# Patient Record
Sex: MALE | Race: WHITE | Employment: UNEMPLOYED | URBAN - METROPOLITAN AREA
[De-identification: names, ages, dates, MRNs, and addresses within clinical notes are randomized per-mention and may not be internally consistent; named-entity substitution may affect disease eponyms.]

---

## 2016-10-14 LAB
CREATININE, EXTERNAL: 1.04
HBA1C MFR BLD HPLC: 7.5 %
LDL-C, EXTERNAL: 80

## 2017-01-09 ENCOUNTER — TELEPHONE (OUTPATIENT)
Dept: CARDIOLOGY CLINIC | Age: 52
End: 2017-01-09

## 2017-01-09 NOTE — TELEPHONE ENCOUNTER
Patient identified times 2. Spoke with patient and relayed abnormal testing and the need for follow up to discuss. He has been scheduled to see Dr. Marissa Cody on 1/12/17. He does not wish to start Imdur until researching medication and coverage. He will discuss with Dr. Marissa Cody during follow up. Understanding verbalized.

## 2017-01-12 ENCOUNTER — OFFICE VISIT (OUTPATIENT)
Dept: CARDIOLOGY CLINIC | Age: 52
End: 2017-01-12

## 2017-01-12 VITALS
DIASTOLIC BLOOD PRESSURE: 80 MMHG | SYSTOLIC BLOOD PRESSURE: 130 MMHG | WEIGHT: 238 LBS | OXYGEN SATURATION: 98 % | RESPIRATION RATE: 16 BRPM | BODY MASS INDEX: 35.25 KG/M2 | HEIGHT: 69 IN | HEART RATE: 76 BPM

## 2017-01-12 DIAGNOSIS — I25.10 CAD IN NATIVE ARTERY: Primary | Chronic | ICD-10-CM

## 2017-01-12 DIAGNOSIS — E11.59 DM TYPE 2 CAUSING VASCULAR DISEASE (HCC): ICD-10-CM

## 2017-01-12 DIAGNOSIS — I10 ESSENTIAL HYPERTENSION, BENIGN: ICD-10-CM

## 2017-01-12 DIAGNOSIS — I63.89 CEREBROVASCULAR ACCIDENT (CVA) DUE TO OTHER MECHANISM (HCC): ICD-10-CM

## 2017-01-12 DIAGNOSIS — E78.5 DYSLIPIDEMIA, GOAL LDL BELOW 70: ICD-10-CM

## 2017-01-12 RX ORDER — CHOLECALCIFEROL TAB 125 MCG (5000 UNIT) 125 MCG
5000 TAB ORAL DAILY
COMMUNITY
End: 2018-02-01

## 2017-01-12 RX ORDER — SILDENAFIL 50 MG/1
50 TABLET, FILM COATED ORAL AS NEEDED
Qty: 20 TAB | Refills: 1 | Status: SHIPPED | OUTPATIENT
Start: 2017-01-12 | End: 2018-02-01

## 2017-01-12 NOTE — MR AVS SNAPSHOT
Visit Information Date & Time Provider Department Dept. Phone Encounter #  
 1/12/2017  1:40 PM Liyah Rodríguez MD [facility] [phone] [id number] Your Appointments 6/22/2017 10:40 AM  
ESTABLISHED PATIENT with Liyah Rodríguez MD  
[facility] (LUIS SCHEDULING) Appt Note: 6 mnth f/u  
 [address] [place] [address]  
[address] [address] Upcoming Health Maintenance Date Due Hepatitis C Screening 1965 FOOT EXAM Q1 4/24/1973 MICROALBUMIN Q1 4/24/1973 EYE EXAM RETINAL OR DILATED Q1 4/24/1973 Pneumococcal 19-64 Highest Risk (1 of 3 - PCV13) 4/24/1982 FOBT Q 1 YEAR AGE 50-75 4/24/2013 HEMOGLOBIN A1C Q6M 7/26/2016 INFLUENZA AGE 9 TO ADULT 8/1/2016 LIPID PANEL Q1 10/14/2017 DTaP/Tdap/Td series (2 - Td) 1/25/2026 Allergies as of 1/12/2017  Review Complete On: 1/12/2017 By: Devaughn Sánchez Severity Noted Reaction Type Reactions Iodinated Contrast Media - Oral And Iv Dye High 09/01/2013   Intolerance Anaphylaxis Current Immunizations  Reviewed on 5/5/2016 Name Date Influenza Vaccine 10/1/2012 Pneumococcal Polysaccharide (PPSV-23)  Deferred (Patient Refused) Tdap 1/25/2016  6:09 PM  
  
 Not reviewed this visit Vitals BP Pulse Resp Height(growth percentile) Weight(growth percentile) SpO2  
 130/80 (BP 1 Location: Left arm, BP Patient Position: Sitting) 76 16 5' 9\" (1.753 m) 238 lb (108 kg) 98% BMI Smoking Status 35.15 kg/m2 Never Smoker BMI and BSA Data Body Mass Index Body Surface Area  
 35.15 kg/m 2 2.29 m 2 Preferred Pharmacy [facility]acy [facility] Phone [facility] [address] [phone] Your Updated Medication List  
  
   
This list is accurate as of: 1/12/17  2:00 PM.  Always use your most recent med list.  
  
  
  
  
 acetaminophen 500 mg tablet Commonly known as:  TYLENOL Take 1,000 mg by mouth daily as needed for Pain. amLODIPine 10 mg tablet Commonly known as:  Iva Colon Take 1 Tab by mouth Daily (before dinner). atorvastatin 40 mg tablet Commonly known as:  LIPITOR Take 2 Tabs by mouth nightly. cholecalciferol (VITAMIN D3) 5,000 unit Tab tablet Commonly known as:  VITAMIN D3 Take 5,000 Units by mouth daily. clopidogrel 75 mg Tab Commonly known as:  PLAVIX Take 1 Tab by mouth daily. coenzyme q10-vitamin e 100-100 mg-unit Cap Take 1 Cap by mouth daily. cyclobenzaprine 10 mg tablet Commonly known as:  FLEXERIL Take 10 mg by mouth as needed. FLUoxetine 20 mg capsule Commonly known as:  PROzac Take 20 mg by mouth daily. insulin lispro protamine/insulin lispro 100 unit/mL (75-25) injection Commonly known as:  HUMALOG MIX 75/25  
40 Units by SubCUTAneous route two (2) times a day. Prior to breakfast in the morning and at bedtime  
  
 losartan 100 mg tablet Commonly known as:  COZAAR Take 1 Tab by mouth daily. LYRICA 50 mg capsule Generic drug:  pregabalin Take 50 mg by mouth three (3) times daily. metFORMIN 500 mg tablet Commonly known as:  GLUCOPHAGE Take 1,500 mg by mouth daily. metoprolol succinate 100 mg tablet Commonly known as:  TOPROL-XL Take 1 Tab by mouth daily. multivitamin tablet Commonly known as:  ONE A DAY Take 1 Tab by mouth daily. MYOFLEX 10 % topical cream  
Generic drug:  trolamine salicylate Apply  to affected area as needed. pantoprazole 40 mg tablet Commonly known as:  PROTONIX Take 40 mg by mouth daily. sildenafil citrate 50 mg tablet Commonly known as:  VIAGRA Take 1 Tab by mouth as needed. tamsulosin 0.4 mg capsule Commonly known as:  FLOMAX Take 0.4 mg by mouth every evening. traZODone 50 mg tablet Commonly known as:  [place] Take 50 mg by mouth nightly. Prescriptions Sent to Pharmacy Refills  
 sildenafil citrate (VIAGRA) 50 mg tablet 1 Sig: Take 1 Tab by mouth as needed. Class: Normal  
 [facility]acy: [facility], [address] [place] [address] #: [phone] Route: Oral  
  
Patient Instructions Take Viagra 50 mg as needed Follow up with Dr. Lizet May in 6 months Introducing [facility] SERVICES! Dear Shahram Navarrete: 
Thank you for requesting a [employer] account. Our records indicate that you already have an active [employer] account. You can access your account anytime at [email or web address] Did you know that you can access your hospital and ER discharge instructions at any time in [employer]? You can also review all of your test results from your hospital stay or ER visit. Additional Information If you have questions, please visit the Frequently Asked Questions section of the [employer] website at [email or web address] Remember, [employer] is NOT to be used for urgent needs. For medical emergencies, dial 911. Now available from your iPhone and Android! Please provide this summary of care documentation to your next provider. Your primary care clinician is listed as Liz Kiran. If you have any questions after today's visit, please call [phone].

## 2017-01-12 NOTE — PROGRESS NOTES
José Manuel Pruett     1965       Kenroy Armstrong MD, [facility]  Date of Visit-1/12/2017   PCP is Greg Epley, MD   [facility]  [facility]  HPI:  José Manuel Pruett is a 46 y.o. male     Extensive anterior MI in 2013 and stroke in 2016. He saw us last month with some chest pain intermittently. A lexiscan nuclear showed EF of 50% with partial reversibility in the anterior apical region. There was a fixed defect in the inferior wall. Patient is back to discuss symptoms and stress test. He states his chest discomfort is left sided, mild and lasting a couple of minutes and worsens with taking a deep breath. He states current pain is not similar to that occurring in 2013 during MI. He also reports persistent ED for which he desires medication. Denies syncope or shortness of breath at rest, has no tachycardia, palpitations or sense of arrhythmia. wants disability paperwork, most of limitation relates to stroke  Sees Endocrine Dr Agueda Duffy OT   Weight high at 238#  Assessment/Plan:       CAD  -prior STEMI with drug eluding stent to right coronary   -most of what I see on the inferior wall defect is fixed defect from that STEMI  -he did have LAD lesion and we see a little anterior apical ischemia but it is a small area and meeting with him today his pain does not sound typical for angina and it certainly has stable pattern   Severe LAD residual unable to cross so fits with dz likey not amenable to PCI   MI Trop was 46  -I think the current medical therapy is appropriate, no cath unless worse symptoms    Erectile dysfuncion  -would need to avoid nitrates but I think he is okay to use ED drugs and will give him Viagra 5 mg to be used as needed     HTN -CCB,ARB, BB  DM2- insulin/Metformin with creatinine fu with -Endocrine  Pain stroke (right basal ganglia by MRI), Lyria  Lipids - high potency statin  Follow up in 6 months      Impression:   1.  CAD in native artery    2. Essential hypertension, benign    3. Dyslipidemia, goal LDL below 70    4. Cerebrovascular accident (CVA) due to other mechanism ([address]    5. DM type 2 causing vascular disease ([address]       Cardiac History:   Cath 9/1/13 Pressures: normal  LVG: mild inferoapical hypokinesis, EF about 60%, no MR  RCA: occluded distally with faint filling of DV by LCA collaterals-- easily reopened, clean moderate size DV, stented with 3 x 22mm ALLAN -->0%. LCA: Small LCX with one ALOM, mild irreg. Ramus proximal 70%, LAD proximal 50% then occluded mid-portion with long area of occlusion and with reconstitution mid-distal, appears to fill by transseptal collaterals. IMP:   Although the LAD is not the acute culprit vessel, I was concerned that there was potential for severe ischemia, I thought there may be a very faint antegrade channel, and this might be the best chance to recanalize and stent, vs waiting and doing so later. I attempted to cross the occlusion, multiple guidewires tries, unable to proceed beyond the site of occlusion. I suspect this is chronic, and apparently had good enough collateral flow to not cause patient exertional angina. Stable anatomy. 9/2/13 Lipid panel   HDL 25   TTE 9/2/13 LVEF 55-60%, mild LVH, mild LAE     ROS-except as noted above. . A complete cardiac and respiratory are reviewed and negative except as above ; Resp-denies wheezing  or productive cough,.  Const- No unusual weight loss or fever; Neuro-no recent seizure or CVA ; GI- No BRBPR, abdom pain, bloating ; - no  hematuria   see supplement sheet, initialed and to be scanned by staff  Past Medical History   Diagnosis Date    BPH (benign prostatic hyperplasia)     CAD (coronary artery disease)      multivessel    CKD (chronic kidney disease), stage III     DM type 2 causing neurological disease ([address]     DM type 2 causing renal disease ([address]     DM type 2 causing vascular disease ([address]     Dyslipidemia, goal LDL below 70     Essential hypertension, benign     Lupus ([facility]     Neuropathy     Thoracic outlet syndrome       Social Hx= reports that he has never smoked. He does not have any smokeless tobacco history on file. He reports that he drinks alcohol. He reports that he does not use illicit drugs. Exam and Labs:    Visit Vitals    /80 (BP 1 Location: Left arm, BP Patient Position: Sitting)    Pulse 76    Resp 16    Ht 5' 9\" (1.753 m)    Wt 238 lb (108 kg)    SpO2 98%    BMI 35.15 kg/m2      Constitutional:  NAD, comfortable  Head: NC,AT. Eyes: No scleral icterus. Neck:  Neck supple. No JVD present. Throat: moist mucous membranes. Chest: Effort normal & normal respiratory excursion . Neurological: alert, conversant and oriented . Skin: Skin is not cold. No obvious systemic rash noted. Not diaphoretic. No erythema. Psychiatric:  Grossly normal mood and affect. Behavior appears normal. Extremities:  no clubbing or cyanosis. Abdomen: non distended    Lungs:breath sounds normal. No stridor. distress, wheezes or  Rales. Heart:    normal rate, regular rhythm, normal S1, S2, no murmurs, rubs, clicks or gallops , PMI non displaced. Edema: Edema is none. Lab Results   Component Value Date/Time    Cholesterol, total 142 05/06/2016 04:50 AM    HDL Cholesterol 27 05/06/2016 04:50 AM    LDL, calculated 79.6 05/06/2016 04:50 AM    Triglyceride 177 05/06/2016 04:50 AM    CHOL/HDL Ratio 5.3 05/06/2016 04:50 AM     No results found for this or any previous visit.    Lab Results   Component Value Date/Time    Sodium 141 06/08/2016 03:38 AM    Potassium 3.6 06/08/2016 03:38 AM    Chloride 105 06/08/2016 03:38 AM    CO2 31 06/08/2016 03:38 AM    Anion gap 5 06/08/2016 03:38 AM    Glucose 80 06/08/2016 03:38 AM    BUN 17 06/08/2016 03:38 AM    Creatinine 0.99 06/08/2016 03:38 AM    BUN/Creatinine ratio 17 06/08/2016 03:38 AM    GFR est AA >60 06/08/2016 03:38 AM    GFR est non-AA >60 06/08/2016 03:38 AM Calcium 8.9 06/08/2016 03:38 AM      Wt Readings from Last 3 Encounters:   01/12/17 238 lb (108 kg)   12/20/16 237 lb (107.5 kg)   05/29/16 234 lb 4.8 oz (106.3 kg)      BP Readings from Last 3 Encounters:   01/12/17 130/80   12/20/16 (!) 160/100   06/08/16 131/89        Current Outpatient Prescriptions   Medication Sig    cholecalciferol, VITAMIN D3, (VITAMIN D3) 5,000 unit tab tablet Take 5,000 Units by mouth daily.  sildenafil citrate (VIAGRA) 50 mg tablet Take 1 Tab by mouth as needed.  metFORMIN (GLUCOPHAGE) 500 mg tablet Take 1,500 mg by mouth daily.  LYRICA 50 mg capsule Take 50 mg by mouth three (3) times daily.  FLUoxetine (PROZAC) 20 mg capsule Take 20 mg by mouth daily.  pantoprazole (PROTONIX) 40 mg tablet Take 40 mg by mouth daily.  cyclobenzaprine (FLEXERIL) 10 mg tablet Take 10 mg by mouth as needed.  trolamine salicylate (MYOFLEX) 10 % topical cream Apply  to affected area as needed.  insulin lispro protamine/insulin lispro (HUMALOG MIX 75/25) 100 unit/mL (75-25) injection 40 Units by SubCUTAneous route two (2) times a day. Prior to breakfast in the morning and at bedtime    amLODIPine (NORVASC) 10 mg tablet Take 1 Tab by mouth Daily (before dinner).  atorvastatin (LIPITOR) 40 mg tablet Take 2 Tabs by mouth nightly.  clopidogrel (PLAVIX) 75 mg tablet Take 1 Tab by mouth daily.  coenzyme q10-vitamin e 100-100 mg-unit cap Take 1 Cap by mouth daily.  acetaminophen (TYLENOL) 500 mg tablet Take 1,000 mg by mouth daily as needed for Pain.  multivitamin (ONE A DAY) tablet Take 1 Tab by mouth daily.  losartan (COZAAR) 100 mg tablet Take 1 Tab by mouth daily.  metoprolol-XL (TOPROL-XL) 100 mg XL tablet Take 1 Tab by mouth daily.  tamsulosin (FLOMAX) 0.4 mg capsule Take 0.4 mg by mouth every evening.  traZODone (DESYREL) 50 mg tablet Take 50 mg by mouth nightly. No current facility-administered medications for this visit.        Impression see above.    Written by Shanel Stevens, as dictated by Amisha Burnett MD.

## 2017-05-30 ENCOUNTER — OFFICE VISIT (OUTPATIENT)
Dept: CARDIOLOGY CLINIC | Age: 52
End: 2017-05-30

## 2017-05-30 DIAGNOSIS — N18.30 CKD (CHRONIC KIDNEY DISEASE), STAGE III (HCC): ICD-10-CM

## 2017-05-30 DIAGNOSIS — I25.10 CAD IN NATIVE ARTERY: Primary | Chronic | ICD-10-CM

## 2017-05-30 DIAGNOSIS — I10 ESSENTIAL HYPERTENSION, BENIGN: ICD-10-CM

## 2017-05-30 DIAGNOSIS — E78.00 HYPERCHOLESTEREMIA: ICD-10-CM

## 2017-05-30 DIAGNOSIS — I25.2 HISTORY OF ACUTE INFERIOR WALL MI: ICD-10-CM

## 2017-05-30 DIAGNOSIS — I67.9 CEREBRAL VASCULAR DISEASE: ICD-10-CM

## 2017-05-30 RX ORDER — GABAPENTIN 600 MG/1
600 TABLET ORAL 2 TIMES DAILY
COMMUNITY
End: 2018-02-01

## 2017-05-30 NOTE — MR AVS SNAPSHOT
Visit Information Date & Time Provider Department Dept. Phone Encounter #  
 5/30/2017  9:40 AM Luly Burkett MD [facility] [phone] [id number] Upcoming Health Maintenance Date Due Hepatitis C Screening 1965 FOOT EXAM Q1 4/24/1973 MICROALBUMIN Q1 4/24/1973 EYE EXAM RETINAL OR DILATED Q1 4/24/1973 Pneumococcal 19-64 Medium Risk (1 of 1 - PPSV23) 4/24/1982 FOBT Q 1 YEAR AGE 50-75 4/24/2013 HEMOGLOBIN A1C Q6M 4/14/2017 INFLUENZA AGE 9 TO ADULT 8/1/2017 LIPID PANEL Q1 10/14/2017 DTaP/Tdap/Td series (2 - Td) 1/25/2026 Allergies as of 5/30/2017  Review Complete On: 5/30/2017 By: Luly Burkett MD  
  
 Severity Noted Reaction Type Reactions Iodinated Contrast Media - Oral And Iv Dye High 09/01/2013   Intolerance Anaphylaxis Current Immunizations  Reviewed on 5/5/2016 Name Date Influenza Vaccine 10/1/2012 Pneumococcal Polysaccharide (PPSV-23)  Deferred (Patient Refused) Tdap 1/25/2016  6:09 PM  
  
 Not reviewed this visit You Were Diagnosed With   
  
 Codes Comments CAD in native artery    -  Primary ICD-10-CM: I25.10 ICD-9-CM: 414.01 Essential hypertension, benign     ICD-10-CM: I10 
ICD-9-CM: 401.1 Hypercholesteremia     ICD-10-CM: E78.00 ICD-9-CM: 272.0 CKD (chronic kidney disease), stage III     ICD-10-CM: N18.3 ICD-9-CM: 106. 3 Cerebral vascular disease     ICD-10-CM: I67.9 ICD-9-CM: 437.9 Vitals Smoking Status Never Smoker Preferred Pharmacy [facility] [address] [phone] Your Updated Medication List  
  
   
This list is accurate as of: 5/30/17 10:55 AM.  Always use your most recent med list.  
  
  
  
  
 acetaminophen 500 mg tablet Commonly known as:  TYLENOL Take 1,000 mg by mouth daily as needed for Pain. amLODIPine 10 mg tablet Commonly known as:  Sandra Dennis Take 1 Tab by mouth Daily (before dinner). atorvastatin 40 mg tablet Commonly known as:  LIPITOR Take 2 Tabs by mouth nightly. cholecalciferol (VITAMIN D3) 5,000 unit Tab tablet Commonly known as:  VITAMIN D3 Take 5,000 Units by mouth daily. clopidogrel 75 mg Tab Commonly known as:  PLAVIX Take 1 Tab by mouth daily. coenzyme q10-vitamin e 100-100 mg-unit Cap Take 1 Cap by mouth daily. cyclobenzaprine 10 mg tablet Commonly known as:  FLEXERIL Take 10 mg by mouth as needed. FLUoxetine 20 mg capsule Commonly known as:  PROzac Take 20 mg by mouth daily. gabapentin 600 mg tablet Commonly known as:  NEURONTIN Take 600 mg by mouth two (2) times a day. insulin lispro protamine/insulin lispro 100 unit/mL (75-25) injection Commonly known as:  HUMALOG MIX 75/25  
40 Units by SubCUTAneous route two (2) times a day. Prior to breakfast in the morning and at bedtime  
  
 losartan 100 mg tablet Commonly known as:  COZAAR Take 1 Tab by mouth daily. metFORMIN 500 mg tablet Commonly known as:  GLUCOPHAGE Take 1,500 mg by mouth daily. metoprolol succinate 100 mg tablet Commonly known as:  TOPROL-XL Take 1 Tab by mouth daily. multivitamin tablet Commonly known as:  ONE A DAY Take 1 Tab by mouth daily. MYOFLEX 10 % topical cream  
Generic drug:  trolamine salicylate Apply  to affected area as needed. pantoprazole 40 mg tablet Commonly known as:  PROTONIX Take 40 mg by mouth daily. sildenafil citrate 50 mg tablet Commonly known as:  VIAGRA Take 1 Tab by mouth as needed. tamsulosin 0.4 mg capsule Commonly known as:  FLOMAX Take 0.4 mg by mouth every evening. traZODone 50 mg tablet Commonly known as:  Adiel Risen Take 50 mg by mouth nightly. We Performed the Following AMB POC EKG ROUTINE W/ 12 LEADS, INTER & REP [81926 CPT(R)] Patient Instructions You will need to follow up in clinic with Dr. Thais Caro in 6 months. Introducing [facility] SERVICES! Dear Ashwini Briceud: 
Thank you for requesting a [employer] account. Our records indicate that you already have an active [employer] account. You can access your account anytime at [email or web address] Did you know that you can access your hospital and ER discharge instructions at any time in [employer]? You can also review all of your test results from your hospital stay or ER visit. Additional Information If you have questions, please visit the Frequently Asked Questions section of the [employer] website at [email or web address] Remember, [employer] is NOT to be used for urgent needs. For medical emergencies, dial 911. Now available from your iPhone and Android! Please provide this summary of care documentation to your next provider. Your primary care clinician is listed as Demian Sauer. If you have any questions after today's visit, please call [phone].

## 2017-06-23 ENCOUNTER — APPOINTMENT (OUTPATIENT)
Dept: CT IMAGING | Age: 52
End: 2017-06-23
Attending: EMERGENCY MEDICINE
Payer: COMMERCIAL

## 2017-06-23 ENCOUNTER — HOSPITAL ENCOUNTER (EMERGENCY)
Age: 52
Discharge: HOME | End: 2017-06-23
Attending: EMERGENCY MEDICINE
Payer: COMMERCIAL

## 2017-06-23 VITALS
WEIGHT: 235.89 LBS | TEMPERATURE: 98 F | HEIGHT: 72 IN | BODY MASS INDEX: 31.95 KG/M2 | RESPIRATION RATE: 17 BRPM | DIASTOLIC BLOOD PRESSURE: 105 MMHG | HEART RATE: 115 BPM | OXYGEN SATURATION: 92 % | SYSTOLIC BLOOD PRESSURE: 175 MMHG

## 2017-06-23 DIAGNOSIS — I10 UNCONTROLLED HYPERTENSION: ICD-10-CM

## 2017-06-23 DIAGNOSIS — R07.9 ACUTE CHEST PAIN: Primary | ICD-10-CM

## 2017-06-23 LAB
ANION GAP BLD CALC-SCNC: 9 MMOL/L (ref 5–15)
ATRIAL RATE: 105 BPM
BASOPHILS # BLD AUTO: 0.1 K/UL (ref 0–0.1)
BASOPHILS # BLD: 1 % (ref 0–1)
BUN SERPL-MCNC: 16 MG/DL (ref 6–20)
BUN/CREAT SERPL: 15 (ref 12–20)
CALCIUM SERPL-MCNC: 9.2 MG/DL (ref 8.5–10.1)
CALCULATED P AXIS, ECG09: 36 DEGREES
CALCULATED R AXIS, ECG10: -149 DEGREES
CALCULATED T AXIS, ECG11: 17 DEGREES
CHLORIDE SERPL-SCNC: 100 MMOL/L (ref 97–108)
CO2 SERPL-SCNC: 26 MMOL/L (ref 21–32)
CREAT SERPL-MCNC: 1.09 MG/DL (ref 0.7–1.3)
DIAGNOSIS, 93000: NORMAL
EOSINOPHIL # BLD: 0.2 K/UL (ref 0–0.4)
EOSINOPHIL NFR BLD: 2 % (ref 0–7)
ERYTHROCYTE [DISTWIDTH] IN BLOOD BY AUTOMATED COUNT: 13.7 % (ref 11.5–14.5)
GLUCOSE SERPL-MCNC: 308 MG/DL (ref 65–100)
HCT VFR BLD AUTO: 43.5 % (ref 36.6–50.3)
HGB BLD-MCNC: 15.1 G/DL (ref 12.1–17)
LYMPHOCYTES # BLD AUTO: 19 % (ref 12–49)
LYMPHOCYTES # BLD: 1.3 K/UL (ref 0.8–3.5)
MCH RBC QN AUTO: 28.7 PG (ref 26–34)
MCHC RBC AUTO-ENTMCNC: 34.7 G/DL (ref 30–36.5)
MCV RBC AUTO: 82.7 FL (ref 80–99)
MONOCYTES # BLD: 0.6 K/UL (ref 0–1)
MONOCYTES NFR BLD AUTO: 9 % (ref 5–13)
NEUTS SEG # BLD: 4.8 K/UL (ref 1.8–8)
NEUTS SEG NFR BLD AUTO: 69 % (ref 32–75)
P-R INTERVAL, ECG05: 146 MS
PLATELET # BLD AUTO: 155 K/UL (ref 150–400)
POTASSIUM SERPL-SCNC: 3.8 MMOL/L (ref 3.5–5.1)
Q-T INTERVAL, ECG07: 390 MS
QRS DURATION, ECG06: 142 MS
QTC CALCULATION (BEZET), ECG08: 515 MS
RBC # BLD AUTO: 5.26 M/UL (ref 4.1–5.7)
SODIUM SERPL-SCNC: 135 MMOL/L (ref 136–145)
TROPONIN I BLD-MCNC: <0.04 NG/ML (ref 0–0.08)
TROPONIN I SERPL-MCNC: <0.04 NG/ML
VENTRICULAR RATE, ECG03: 105 BPM
WBC # BLD AUTO: 6.9 K/UL (ref 4.1–11.1)

## 2017-06-23 PROCEDURE — 80048 BASIC METABOLIC PNL TOTAL CA: CPT | Performed by: EMERGENCY MEDICINE

## 2017-06-23 PROCEDURE — 99285 EMERGENCY DEPT VISIT HI MDM: CPT

## 2017-06-23 PROCEDURE — 84484 ASSAY OF TROPONIN QUANT: CPT | Performed by: EMERGENCY MEDICINE

## 2017-06-23 PROCEDURE — 85025 COMPLETE CBC W/AUTO DIFF WBC: CPT | Performed by: EMERGENCY MEDICINE

## 2017-06-23 PROCEDURE — 36415 COLL VENOUS BLD VENIPUNCTURE: CPT | Performed by: EMERGENCY MEDICINE

## 2017-06-23 PROCEDURE — 70450 CT HEAD/BRAIN W/O DYE: CPT

## 2017-06-23 PROCEDURE — 93005 ELECTROCARDIOGRAM TRACING: CPT

## 2017-06-23 NOTE — ED NOTES
Patient arrived through triage c/o high blood pressure and headache. Patient states he was sent over by doctor during an us for arteries and they found his bp was high. Patient is resting in bed, bed in low position, call bell in reach, monitor x3.

## 2017-06-23 NOTE — DISCHARGE INSTRUCTIONS
We hope that we have addressed all of your medical concerns. The examination and treatment you received in the Emergency Department were for an emergent problem and were not intended as complete care. It is important that you follow up with your healthcare provider(s) for ongoing care. If your symptoms worsen or do not improve as expected, and you are unable to reach your usual health care provider(s), you should return to the Emergency Department. Today's healthcare is undergoing tremendous change, and patient satisfaction surveys are one of the many tools to assess the quality of medical care. You may receive a survey from the [employer] regarding your experience in the Emergency Department. I hope that your experience has been completely positive, particularly the medical care that I provided. As such, please participate in the survey; anything less than excellent does not meet my expectations or intentions. Formerly [facility] and [facility] participate in nationally recognized quality of care measures. If your blood pressure is greater than 120/80, as reported below, we urge that you seek medical care to address the potential of high blood pressure, commonly known as hypertension. Hypertension can be hereditary or can be caused by certain medical conditions, pain, stress, or \"white coat syndrome. \"       Please make an appointment with your health care provider(s) for follow up of your Emergency Department visit. VITALS:   Patient Vitals for the past 8 hrs:   Temp Pulse Resp BP SpO2   06/23/17 1400 - - - (!) 175/105 -   06/23/17 1359 - - - - 92 %   06/23/17 1305 - - - (!) 176/115 95 %   06/23/17 1235 - - - (!) 189/112 91 %   06/23/17 1206 - - - (!) 185/134 96 %   06/23/17 1121 98 °F (36.7 °C) (!) 115 17 (!) 175/116 96 %          Thank you for allowing us to provide you with medical care today.   We realize that you have many choices for your emergency care needs. Please choose us in the future for any continued health care needs. Regards,           Manoj David DO    [facility], [facility].   Office: [phone]            Recent Results (from the past 24 hour(s))   EKG, 12 LEAD, INITIAL    Collection Time: 06/23/17 11:31 AM   Result Value Ref Range    Ventricular Rate 105 BPM    Atrial Rate 105 BPM    P-R Interval 146 ms    QRS Duration 142 ms    Q-T Interval 390 ms    QTC Calculation (Bezet) 515 ms    Calculated P Axis 36 degrees    Calculated R Axis -149 degrees    Calculated T Axis 17 degrees    Diagnosis       Sinus tachycardia  Right bundle branch block  Inferior infarct (cited on or before 01-SEP-2013)  Abnormal ECG  When compared with ECG of 06-MAY-2016 04:54,  QRS axis shifted left  Questionable change in initial forces of Inferior leads     CBC WITH AUTOMATED DIFF    Collection Time: 06/23/17 11:47 AM   Result Value Ref Range    WBC 6.9 4.1 - 11.1 K/uL    RBC 5.26 4.10 - 5.70 M/uL    HGB 15.1 12.1 - 17.0 g/dL    HCT 43.5 36.6 - 50.3 %    MCV 82.7 80.0 - 99.0 FL    MCH 28.7 26.0 - 34.0 PG    MCHC 34.7 30.0 - 36.5 g/dL    RDW 13.7 11.5 - 14.5 %    PLATELET 619 225 - 558 K/uL    NEUTROPHILS 69 32 - 75 %    LYMPHOCYTES 19 12 - 49 %    MONOCYTES 9 5 - 13 %    EOSINOPHILS 2 0 - 7 %    BASOPHILS 1 0 - 1 %    ABS. NEUTROPHILS 4.8 1.8 - 8.0 K/UL    ABS. LYMPHOCYTES 1.3 0.8 - 3.5 K/UL    ABS. MONOCYTES 0.6 0.0 - 1.0 K/UL    ABS. EOSINOPHILS 0.2 0.0 - 0.4 K/UL    ABS.  BASOPHILS 0.1 0.0 - 0.1 K/UL   METABOLIC PANEL, BASIC    Collection Time: 06/23/17 11:47 AM   Result Value Ref Range    Sodium 135 (L) 136 - 145 mmol/L    Potassium 3.8 3.5 - 5.1 mmol/L    Chloride 100 97 - 108 mmol/L    CO2 26 21 - 32 mmol/L    Anion gap 9 5 - 15 mmol/L    Glucose 308 (H) 65 - 100 mg/dL    BUN 16 6 - 20 MG/DL    Creatinine 1.09 0.70 - 1.30 MG/DL    BUN/Creatinine ratio 15 12 - 20      GFR est AA >60 >60 ml/min/1.73m2    GFR est non-AA >60 >60 ml/min/1.73m2    Calcium 9.2 8.5 - 10.1 MG/DL   TROPONIN I    Collection Time: 06/23/17 11:47 AM   Result Value Ref Range    Troponin-I, Qt. <0.04 <0.05 ng/mL   POC TROPONIN-I    Collection Time: 06/23/17  1:39 PM   Result Value Ref Range    Troponin-I (POC) <0.04 0.00 - 0.08 ng/mL       Ct Head Wo Cont    Result Date: 6/23/2017  EXAM:  CT HEAD WO CONT Clinical history: Headache on blood pressure INDICATION:   headache, elevated blood pressure. hx of stroke. COMPARISON: 5/5/2016. TECHNIQUE: Unenhanced CT of the head was performed using 5 mm images. Brain and bone windows were generated. CT dose reduction was achieved through use of a standardized protocol tailored for this examination and automatic exposure control for dose modulation. FINDINGS: Remote infarction in the right basal ganglia. No acute infarct is identified. . There is no significant white matter disease. There is no intracranial hemorrhage, extra-axial collection, mass, mass effect or midline shift. The basilar cisterns are open. No acute infarct is identified. The bone windows demonstrate no abnormalities. The visualized portions of the paranasal sinuses and mastoid air cells are clear. IMPRESSION: No acute intracranial process. Chest Pain: Care Instructions  Your Care Instructions  There are many things that can cause chest pain. Some are not serious and will get better on their own in a few days. But some kinds of chest pain need more testing and treatment. Your doctor may have recommended a follow-up visit in the next 8 to 12 hours. If you are not getting better, you may need more tests or treatment. Even though your doctor has released you, you still need to watch for any problems. The doctor carefully checked you, but sometimes problems can develop later. If you have new symptoms or if your symptoms do not get better, get medical care right away.   If you have worse or different chest pain or pressure that lasts more than 5 minutes or you passed out (lost consciousness), call 911 or seek other emergency help right away. A medical visit is only one step in your treatment. Even if you feel better, you still need to do what your doctor recommends, such as going to all suggested follow-up appointments and taking medicines exactly as directed. This will help you recover and help prevent future problems. How can you care for yourself at home? · Rest until you feel better. · Take your medicine exactly as prescribed. Call your doctor if you think you are having a problem with your medicine. · Do not drive after taking a prescription pain medicine. When should you call for help? Call 911 if:  · You passed out (lost consciousness). · You have severe difficulty breathing. · You have symptoms of a heart attack. These may include:  ¨ Chest pain or pressure, or a strange feeling in your chest.  ¨ Sweating. ¨ Shortness of breath. ¨ Nausea or vomiting. ¨ Pain, pressure, or a strange feeling in your back, neck, jaw, or upper belly or in one or both shoulders or arms. ¨ Lightheadedness or sudden weakness. ¨ A fast or irregular heartbeat. After you call 911, the  may tell you to chew 1 adult-strength or 2 to 4 low-dose aspirin. Wait for an ambulance. Do not try to drive yourself. Call your doctor today if:  · You have any trouble breathing. · Your chest pain gets worse. · You are dizzy or lightheaded, or you feel like you may faint. · You are not getting better as expected. · You are having new or different chest pain. Where can you learn more? Go to http://julián-lyle.info/. Enter A120 in the search box to learn more about \"Chest Pain: Care Instructions. \"  Current as of: March 20, 2017  Content Version: 11.3  © 9638-7279 [employer]. Care instructions adapted under license by [employer] (which disclaims liability or warranty for this information).  If you have questions about a medical condition or this instruction, always ask your healthcare professional. [address] any warranty or liability for your use of this information. High Blood Pressure: Care Instructions  Your Care Instructions  If your blood pressure is usually above 140/90, you have high blood pressure, or hypertension. That means the top number is 140 or higher or the bottom number is 90 or higher, or both. Despite what a lot of people think, high blood pressure usually doesn't cause headaches or make you feel dizzy or lightheaded. It usually has no symptoms. But it does increase your risk for heart attack, stroke, and kidney or eye damage. The higher your blood pressure, the more your risk increases. Your doctor will give you a goal for your blood pressure. Your goal will be based on your health and your age. An example of a goal is to keep your blood pressure below 140/90. Lifestyle changes, such as eating healthy and being active, are always important to help lower blood pressure. You might also take medicine to reach your blood pressure goal.  Follow-up care is a key part of your treatment and safety. Be sure to make and go to all appointments, and call your doctor if you are having problems. It's also a good idea to know your test results and keep a list of the medicines you take. How can you care for yourself at home? Medical treatment  · If you stop taking your medicine, your blood pressure will go back up. You may take one or more types of medicine to lower your blood pressure. Be safe with medicines. Take your medicine exactly as prescribed. Call your doctor if you think you are having a problem with your medicine. · Talk to your doctor before you start taking aspirin every day. Aspirin can help certain people lower their risk of a heart attack or stroke. But taking aspirin isn't right for everyone, because it can cause serious bleeding. · See your doctor regularly.  You may need to see the doctor more often at first or until your blood pressure comes down. · If you are taking blood pressure medicine, talk to your doctor before you take decongestants or anti-inflammatory medicine, such as ibuprofen. Some of these medicines can raise blood pressure. · Learn how to check your blood pressure at home. Lifestyle changes  · Stay at a healthy weight. This is especially important if you put on weight around the waist. Losing even 10 pounds can help you lower your blood pressure. · If your doctor recommends it, get more exercise. Walking is a good choice. Bit by bit, increase the amount you walk every day. Try for at least 30 minutes on most days of the week. You also may want to swim, bike, or do other activities. · Avoid or limit alcohol. Talk to your doctor about whether you can drink any alcohol. · Try to limit how much sodium you eat to less than 2,300 milligrams (mg) a day. Your doctor may ask you to try to eat less than 1,500 mg a day. · Eat plenty of fruits (such as bananas and oranges), vegetables, legumes, whole grains, and low-fat dairy products. · Lower the amount of saturated fat in your diet. Saturated fat is found in animal products such as milk, cheese, and meat. Limiting these foods may help you lose weight and also lower your risk for heart disease. · Do not smoke. Smoking increases your risk for heart attack and stroke. If you need help quitting, talk to your doctor about stop-smoking programs and medicines. These can increase your chances of quitting for good. When should you call for help? Call 911 anytime you think you may need emergency care. This may mean having symptoms that suggest that your blood pressure is causing a serious heart or blood vessel problem. Your blood pressure may be over 180/110. For example, call 911 if:  · You have symptoms of a heart attack.  These may include:  ¨ Chest pain or pressure, or a strange feeling in the chest.  ¨ Sweating. ¨ Shortness of breath. ¨ Nausea or vomiting. ¨ Pain, pressure, or a strange feeling in the back, neck, jaw, or upper belly or in one or both shoulders or arms. ¨ Lightheadedness or sudden weakness. ¨ A fast or irregular heartbeat. · You have symptoms of a stroke. These may include:  ¨ Sudden numbness, tingling, weakness, or loss of movement in your face, arm, or leg, especially on only one side of your body. ¨ Sudden vision changes. ¨ Sudden trouble speaking. ¨ Sudden confusion or trouble understanding simple statements. ¨ Sudden problems with walking or balance. ¨ A sudden, severe headache that is different from past headaches. · You have severe back or belly pain. Do not wait until your blood pressure comes down on its own. Get help right away. Call your doctor now or seek immediate care if:  · Your blood pressure is much higher than normal (such as 180/110 or higher), but you don't have symptoms. · You think high blood pressure is causing symptoms, such as:  ¨ Severe headache. ¨ Blurry vision. Watch closely for changes in your health, and be sure to contact your doctor if:  · Your blood pressure measures 140/90 or higher at least 2 times. That means the top number is 140 or higher or the bottom number is 90 or higher, or both. · You think you may be having side effects from your blood pressure medicine. · Your blood pressure is usually normal, but it goes above normal at least 2 times. Where can you learn more? Go to http://julián-lyle.info/. Enter R479 in the search box to learn more about \"High Blood Pressure: Care Instructions. \"  Current as of: August 8, 2016  Content Version: 11.3  © 8800-0192 [employer]. Care instructions adapted under license by [employer] (which disclaims liability or warranty for this information).  If you have questions about a medical condition or this instruction, always ask your healthcare professional. [address] any warranty or liability for your use of this information.

## 2017-06-23 NOTE — ED PROVIDER NOTES
HPI Comments: 45 y.o. male with extensive past medical history, please see list, significant for HTN, stroke CAD, CKD stage III, BPH, type II DM, MI and neuropathy who presents ambulatory from Dr. Fay Becker office with chief complaint of elevated blood pressure. Pt was at a routine f/u visit today with vascular surgery for a carotid artery ultrasound when his blood pressure was noted to be elevated. Pt's initial reading was 200/100, second reading was 212/133 and final reading at 1045 was 199/128, so he was referred to the ED. Pt reports associated headache and lower back pain. Pt describes headache as \"fullness\" and \"throbbing\" primarily over the back of his head with a severity of 2/10. Pt denies treatment, stating \"nothing works for the headaches\". Of note, pt has frequent headaches that last for weeks at a time. Pt's current headache has been ongoing for 2 weeks. Pt's blood pressure is currently treated with losartan and amlodipine. When his pressures are controlled appropriately, his baseline blood pressure is 110/78. Pt denies missing any doses of his medications. Pt also notes blood pressure is effected by eating certain foods. He had salted boiled peanuts last night, which he suspects contributed to his elevated blood pressure. Pt states \"throbbing\" lower back pain is usually associated with elevated blood pressure, although he notes chronic back pain secondary to DDD. Pt describes ongoing, intermittent episodes of right-sided chest discomfort that he attributes to angina. Pt states episodes last between seconds and minutes at at time. Pt denies any pattern to the pain. Of note, pt is diaphoretic to which pt responded: \"I get hot easily\". Pt endorses a history of stroke requiring extended hospitalization on 5/5/16. Pt describes residual left-sided deficits and intermittent slurred speech. Pt states he only slept for 3 hours last night.  Pt describes specifically describes unemployment and finances as stressors in his life. Pt denies a history of brain hemorrhage. Pt is followed by several specialists, including vascular, endocrinologist, cardiologist, psychiatrist, and nephrologist. Pt specifically denies abd pain, nausea, SOB and visual changes. There are no other acute medical concerns at this time. Old Chart Review:  Pt evaluated by cardiology out-patient on 5/30/17. Blood pressure reading was 180/110. He was told to begin keeping a blood pressure diary and restarted losartan. Pt has a history of cath in September, 2013 showed occluded RCA filling by collaterals with also occlusion in the LAD. Last nuclear stress test showed fixed inferior wall defect with partial reversibility in the anterior apical wall. Pt has a history of CKD stage III. Social hx: denies tobacco use; uses EtOH; denies illicit drug use; former paramedic;   PCP: Vickki Galeazzi, MD  Cardiology: Sandrita Schmidt MD  Vascular Surgery: Xi Martino MD    Note written by Emily Villeda, as dictated by Manoj David, DO 12:00 PM        The history is provided by the patient.         Past Medical History:   Diagnosis Date    BPH (benign prostatic hyperplasia)     CAD (coronary artery disease)     multivessel    CKD (chronic kidney disease), stage III     DM type 2 causing neurological disease ([address]     DM type 2 causing renal disease ([address]     DM type 2 causing vascular disease (HCC)     Dyslipidemia, goal LDL below 79     Essential hypertension, benign     History of acute inferior wall MI 5/30/2017    Lupus ([address]     Neuropathy     Thoracic outlet syndrome        Past Surgical History:   Procedure Laterality Date    HX ORTHOPAEDIC  1996    L. knee arthroscopy         Family History:   Problem Relation Age of Onset    Heart Disease Mother     Stroke Mother     Diabetes Father     Stroke Father     Heart Disease Father        Social History     Social History    Marital status:      Spouse name: N/A   Nicole Paul Number of children: N/A    Years of education: N/A     Occupational History    Not on file. Social History Main Topics    Smoking status: Never Smoker    Smokeless tobacco: Not on file    Alcohol use Yes    Drug use: No    Sexual activity: Not on file     Other Topics Concern    Not on file     Social History Narrative         ALLERGIES: Iodinated contrast- oral and iv dye    Review of Systems   Constitutional: Positive for diaphoresis. Eyes: Negative for visual disturbance. Respiratory: Negative for shortness of breath. Gastrointestinal: Negative for abdominal pain and nausea. Musculoskeletal: Positive for back pain (lower). Neurological: Positive for headaches. All other systems reviewed and are negative. Vitals:    06/23/17 1121   BP: (!) 175/116   Pulse: (!) 115   Resp: 17   Temp: 98 °F (36.7 °C)   SpO2: 96%   Weight: 107 kg (235 lb 14.3 oz)   Height: 6' (1.829 m)            Physical Exam   Nursing note and vitals reviewed. Constitutional: Pt is awake and alert. Pt appears well-developed and well-nourished. NAD. HENT:   Head: Normocephalic and atraumatic. Nose: Nose normal.   Mouth/Throat: Oropharynx is clear and moist. No oropharyngeal exudate. Eyes: Conjunctivae and extraocular motions are normal. Pupils are equal, round, and reactive to light. Right eye exhibits no discharge. Left eye exhibits no discharge. No scleral icterus. Neck: No tracheal deviation present. Supple neck. Cardiovascular: Normal rate, regular rhythm, normal heart sounds and intact distal pulses. Exam reveals no gallop and no friction rub. No murmur heard. Pulmonary/Chest: Effort normal and breath sounds normal.  Pt  has no wheezes. Pt  has no rales. Abdominal: Soft. Pt  exhibits no distension and no mass. No tenderness. Pt  has no rebound and no guarding. Musculoskeletal:  Pt  exhibits no edema and no tenderness.    Ext: Normal ROM in all four extremities; not tender to palpation; distal pulses are normal, no edema. Neurological:  Pt is alert. nonfocal neuro exam. CN 2-12 intact. Normal coordination. Normal gait. Normal sensation. Normal strength. Skin: Skin is warm and dry. Slightly diaphoretic. Psychiatric:  Pt  has a normal mood and affect. Behavior is normal.   Note written by Emily Almanza, as dictated by Yuly Looney DO 12:00 PM       [facility]  ED Course       Procedures    ED EKG interpretation:  Rhythm: sinus tachycardia and RBBB; and regular . Rate (approx.): 106; Axis: normal; P wave: normal; QRS interval: normal ; ST/T wave: normal; This EKG was interpreted by Yuly Looney DO,ED Provider.          Has asymptomatic elevated BP    Refer back to PCP  Doubt cva    CT noted    Labs Reviewed   METABOLIC PANEL, BASIC - Abnormal; Notable for the following:        Result Value    Sodium 135 (*)     Glucose 308 (*)     All other components within normal limits   SAMPLES BEING HELD   CBC WITH AUTOMATED DIFF   TROPONIN I   POC TROPONIN-I

## 2017-06-23 NOTE — ED NOTES
Dr. Axel Wilkins reviewed discharge instructions with the patient. The patient verbalized understanding. All questions and concerns were addressed. The patient declined a wheelchair and is discharged ambulatory in the care of family members with instructions and prescriptions in hand. Pt is alert and oriented x 4. Respirations are clear and unlabored.

## 2018-02-01 ENCOUNTER — OFFICE VISIT (OUTPATIENT)
Dept: CARDIOLOGY CLINIC | Age: 53
End: 2018-02-01

## 2018-02-01 VITALS
HEART RATE: 96 BPM | WEIGHT: 223.8 LBS | OXYGEN SATURATION: 96 % | HEIGHT: 72 IN | RESPIRATION RATE: 18 BRPM | BODY MASS INDEX: 30.31 KG/M2 | SYSTOLIC BLOOD PRESSURE: 124 MMHG | DIASTOLIC BLOOD PRESSURE: 72 MMHG

## 2018-02-01 DIAGNOSIS — I10 ESSENTIAL HYPERTENSION: ICD-10-CM

## 2018-02-01 DIAGNOSIS — N18.30 CKD (CHRONIC KIDNEY DISEASE), STAGE III (HCC): ICD-10-CM

## 2018-02-01 DIAGNOSIS — E78.5 DYSLIPIDEMIA: ICD-10-CM

## 2018-02-01 DIAGNOSIS — I25.10 CAD IN NATIVE ARTERY: Primary | Chronic | ICD-10-CM

## 2018-02-01 DIAGNOSIS — I67.9 CEREBRAL VASCULAR DISEASE: ICD-10-CM

## 2018-02-01 DIAGNOSIS — I25.2 HISTORY OF ACUTE INFERIOR WALL MI: ICD-10-CM

## 2018-02-01 RX ORDER — VALSARTAN 160 MG/1
320 TABLET ORAL DAILY
COMMUNITY
End: 2018-10-16

## 2018-02-01 RX ORDER — OMEPRAZOLE 40 MG/1
40 CAPSULE, DELAYED RELEASE ORAL DAILY
COMMUNITY
End: 2018-06-02

## 2018-02-01 RX ORDER — QUETIAPINE FUMARATE 25 MG/1
50 TABLET, FILM COATED ORAL
COMMUNITY
End: 2018-10-16

## 2018-02-01 RX ORDER — CARBAMAZEPINE 200 MG/1
200 TABLET ORAL 2 TIMES DAILY
COMMUNITY
End: 2018-10-16

## 2018-02-01 RX ORDER — CHLORTHALIDONE 25 MG/1
TABLET ORAL DAILY
COMMUNITY
End: 2020-11-19

## 2018-02-01 RX ORDER — CLONIDINE HYDROCHLORIDE 0.1 MG/1
0.4 TABLET ORAL 2 TIMES DAILY
COMMUNITY
End: 2018-10-16

## 2018-02-01 NOTE — MR AVS SNAPSHOT
[address] [facility] [address] [facility] 57 
[phone] Patient: Sandy Cruz MRN: [id number] :1965 Visit Information Date & Time Provider Department Dept. Phone Encounter #  
 2018  3:00 PM Juliano Lee MD [facility] [phone] [id number] Upcoming Health Maintenance Date Due Hepatitis C Screening 1965 FOOT EXAM Q1 1973 MICROALBUMIN Q1 1973 EYE EXAM RETINAL OR DILATED Q1 1973 Pneumococcal 19-64 Medium Risk (1 of 1 - PPSV23) 1982 FOBT Q 1 YEAR AGE 50-75 2013 HEMOGLOBIN A1C Q6M 2017 Influenza Age 5 to Adult 2017 LIPID PANEL Q1 10/14/2017 DTaP/Tdap/Td series (2 - Td) 2026 Allergies as of 2018  Review Complete On: 2018 By: Chaim Rivera Severity Noted Reaction Type Reactions Iodinated Contrast- Oral And Iv Dye High 2013   Intolerance Anaphylaxis Current Immunizations  Reviewed on 2016 Name Date Influenza Vaccine 10/1/2012 Pneumococcal Polysaccharide (PPSV-23)  Deferred (Patient Refused) Tdap 2016  6:09 PM  
  
 Not reviewed this visit You Were Diagnosed With   
  
 Codes Comments CAD in native artery    -  Primary ICD-10-CM: I25.10 ICD-9-CM: 414.01 Vitals BP Pulse Resp Height(growth percentile) Weight(growth percentile) SpO2  
 124/72 (BP 1 Location: Left arm) 96 18 6' (1.829 m) 223 lb 12.8 oz (101.5 kg) 96% BMI Smoking Status 30.35 kg/m2 Never Smoker Vitals History BMI and BSA Data Body Mass Index Body Surface Area  
 30.35 kg/m 2 2.27 m 2 Preferred Pharmacy Pharmacy Name Phone [facility] [address], [address] [place] [address] [phone] Your Updated Medication List  
  
   
This list is accurate as of: 18  3:41 PM.  Always use your most recent med list.  
  
  
  
  
 amLODIPine 10 mg tablet Commonly known as:  Rulon Alyssa Take 1 Tab by mouth Daily (before dinner). atorvastatin 40 mg tablet Commonly known as:  LIPITOR Take 2 Tabs by mouth nightly. chlorthalidone 25 mg tablet Commonly known as:  Arvind Gun Take  by mouth daily. cloNIDine HCl 0.1 mg tablet Commonly known as:  CATAPRES Take 0.2 mg by mouth two (2) times a day. clopidogrel 75 mg Tab Commonly known as:  PLAVIX Take 1 Tab by mouth daily. cyclobenzaprine 10 mg tablet Commonly known as:  FLEXERIL Take 10 mg by mouth as needed. DIOVAN 160 mg tablet Generic drug:  valsartan Take  by mouth daily. * HumaLOG Mix 75-25 100 unit/mL (75-25) injection Generic drug:  insulin lispro protamine/insulin lispro 35 Units by SubCUTAneous route two (2) times a day. * insulin lispro protamine/insulin lispro 100 unit/mL (75-25) injection Commonly known as:  HUMALOG MIX 75/25  
40 Units by SubCUTAneous route two (2) times a day. Prior to breakfast in the morning and at bedtime MYOFLEX 10 % topical cream  
Generic drug:  trolamine salicylate Apply  to affected area as needed. PriLOSEC 40 mg capsule Generic drug:  omeprazole Take 40 mg by mouth daily. SEROquel 25 mg tablet Generic drug:  QUEtiapine Take  by mouth nightly. tamsulosin 0.4 mg capsule Commonly known as:  FLOMAX Take 0.4 mg by mouth every evening. TEGretol 200 mg tablet Generic drug:  carBAMazepine Take 200 mg by mouth three (3) times daily. traZODone 50 mg tablet Commonly known as:  Rodriges Rinne Take 50 mg by mouth as needed. VYVANSE 20 mg capsule Generic drug:  lisdexamfetamine Take by mouth daily. * Notice: This list has 2 medication(s) that are the same as other medications prescribed for you. Read the directions carefully, and ask your doctor or other care provider to review them with you. To-Do List   
 07/01/2018 ECHO:  ECHO TTE STRESS EXRCSE COMP W OR WO CONTR Patient Instructions You will need to follow up in clinic with Dr. Naomy Thacker in 6 months. Please schedule a Stress Echo prior to office visit. Same day is okay. Introducing \[facility] [place] [facility]\"" & [facility] SERVICES! Dear Kimber Aguilar: 
Thank you for requesting a "[employer] account. Our records indicate that you already have an active "[employer] account. You can access your account anytime at [email or web address] Did you know that you can access your hospital and ER discharge instructions at any time in "[employer]? You can also review all of your test results from your hospital stay or ER visit. Additional Information If you have questions, please visit the Frequently Asked Questions section of the "[employer] website at [email or web address] Remember, "[employer] is NOT to be used for urgent needs. For medical emergencies, dial 911. Now available from your iPhone and Android! Please provide this summary of care documentation to your next provider. Your primary care clinician is listed as Trevor Pabloch. If you have any questions after today's visit, please call [phone].

## 2018-02-01 NOTE — PROGRESS NOTES
Stress Echo ordered     Verbal order per Dr. Ching Munguia    Per  VO to discontinue all medication not taken.

## 2018-02-01 NOTE — PATIENT INSTRUCTIONS
You will need to follow up in clinic with Dr. Kai Harrison in 6 months. Please schedule a Stress Echo prior to office visit. Same day is okay.

## 2018-02-01 NOTE — PROGRESS NOTES
Michael Birmingham     1965       Kenroy Luciano MD, [facility]  Date of Visit-2/1/2018   PCP is Desi Long MD   [facility] [place]  [facility]  HPI:  Michael Birmingham is a 45 y.o. male   Previous hx of anterior MI in 2013 stroke in 2016.   --Melda Randalier nuclear showed EF of 50% with partial reversibility in the anterior apical region. There was a fixed defect in the inferior wall    Overall the pt states he is doing well. The pt notes that his medications have been moving around quite a bit but he is keeping track of all this. His HBG A1C was 7.8%. Denies chest pain, edema, syncope or shortness of breath at rest, has no tachycardia, palpitations or sense of arrhythmia. EKG:     Assessment/Plan:     1. CAD   ---MI with troponin of 51 ;cath September 2013, occluded RCA filling by collaterals and managed medically with partial occlusion of the LAD has done well with medical therapy   --defect with partial reversibility in the anterior apical wall. We discussed results with him, felt that was consistent with known coronary artery disease anatomy from the cath and felt he could be managed medically. 2. Hypertension at goal   Home bp diary-keep  Key CAD CHF Meds             chlorthalidone (HYGROTEN) 25 mg tablet  (Taking) Take  by mouth daily. cloNIDine HCl (CATAPRES) 0.1 mg tablet  (Taking) Take 0.2 mg by mouth two (2) times a day. valsartan (DIOVAN) 160 mg tablet  (Taking) Take  by mouth daily. amLODIPine (NORVASC) 10 mg tablet  (Taking) Take 1 Tab by mouth Daily (before dinner). clopidogrel (PLAVIX) 75 mg tablet  (Taking) Take 1 Tab by mouth daily. BP Readings from Last 3 Encounters:   02/01/18 124/72   06/23/17 (!) 175/105   01/12/17 130/80     3. Dyslipidemia on Lipitor followed by Dr. Stiven Bhat will obtain lab results    4. CKD III -stable on ARB    5.  Cerebrovascular disease, medical therapy with prior right basilar ganglia stroke  DM2= main cardiovascular disease risk factors     6. Follow up in 6 months with stress echo, and get lab results. Future Appointments  Date Time Provider Keiko Haque   8/16/2018 1:00 PM ECHO, [address]   8/16/2018 1:00 PM STRESSECHO, [address]   8/16/2018 2:00 PM MD JESSICA Brown LUIS SCHED              Impression:   1. CAD in native artery    2. Essential hypertension    3. Dyslipidemia    4. Cerebral vascular disease    5. History of acute inferior wall MI    6. CKD (chronic kidney disease), stage III       Cardiac History:   MI Troponin 51   Cath 9/1/13 LVG: mild inferoapical hypokinesis, EF about 60%  RCA: occluded distally with faint filling of DV by LCA collaterals-- easily reopened, clean moderate size DV, stented with 3 x 22mm ALLAN -->0%. LCA: Small LCX with one ALOM, mild irreg. Ramus proximal 70%  LAD proximal 50% then occluded mid-portion with long area of occlusion and with reconstitution mid-distal, appears to fill by transseptal collaterals. =attempted to cross the occlusion, multiple guidewires tries, unable to proceed beyond the site of occlusion. I suspect this is chronic, and apparently had good enough collateral flow to not cause patient exertional angina. Nuke 12-29-16= Lexiscan fixed mid-basl inferior wall & apical defect with partial reversibility at anteroapical wall, EF 50%  CVA 5/2016 right basal ganglia by MRI     ROS-except as noted above. . A complete cardiac and respiratory are reviewed and negative except as above ; Resp-denies wheezing  or productive cough,.  Const- No unusual weight loss or fever; Neuro-no recent seizure or CVA ; GI- No BRBPR, abdom pain, bloating ; - no  hematuria   see supplement sheet, initialed and to be scanned by staff  Past Medical History:   Diagnosis Date    BPH (benign prostatic hyperplasia)     CAD (coronary artery disease)     multivessel    CKD (chronic kidney disease), stage III     DM type 2 [facility] disease [facility]     DM type 2 causing renal disease [facility]     [facility]     Dyslipidemia, goal LDL below 70     Essential hypertension, benign     History of acute inferior wall MI 5/30/2017    Lupus     Neuropathy     Thoracic outlet syndrome       Social Hx= reports that he has never smoked. He has never used smokeless tobacco. He reports that he drinks alcohol. He reports that he does not use illicit drugs. Exam and Labs:  /72 (BP 1 Location: Left arm)  Pulse 96  Resp 18  Ht 6' (1.829 m)  Wt 223 lb 12.8 oz (101.5 kg)  SpO2 96%  BMI 30.35 kg/n9Bcpvuclazbattw:  NAD, comfortable  Head: NC,AT. Eyes: No scleral icterus. Neck:  Neck supple. No JVD present. Throat: moist mucous membranes. Chest: Effort normal & normal respiratory excursion . Neurological: alert, conversant and oriented . Skin: Skin is not cold. No obvious systemic rash noted. Not diaphoretic. No erythema. Psychiatric:  Grossly normal mood and affect. Behavior appears normal. Extremities:  no clubbing or cyanosis. Abdomen: non distended    Lungs:breath sounds normal. No stridor. distress, wheezes or  Rales. HYVXE:1/9 systolic murmur at the RUSB without radiation normal rate, regular rhythm, normal S1, S2, no rubs, clicks or gallops , PMI non displaced. Edema: Edema is none.   Lab Results   Component Value Date/Time    Cholesterol, total 142 05/06/2016 04:50 AM    HDL Cholesterol 27 05/06/2016 04:50 AM    LDL, calculated 79.6 05/06/2016 04:50 AM    Triglyceride 177 05/06/2016 04:50 AM    CHOL/HDL Ratio 5.3 05/06/2016 04:50 AM     Lab Results   Component Value Date/Time    Sodium 135 06/23/2017 11:47 AM    Potassium 3.8 06/23/2017 11:47 AM    Chloride 100 06/23/2017 11:47 AM    CO2 26 06/23/2017 11:47 AM    Anion gap 9 06/23/2017 11:47 AM    Glucose 308 06/23/2017 11:47 AM    BUN 16 06/23/2017 11:47 AM    Creatinine 1.09 06/23/2017 11:47 AM    BUN/Creatinine ratio 15 06/23/2017 11:47 AM    GFR est AA >60 06/23/2017 11:47 AM    GFR est non-AA >60 06/23/2017 11:47 AM    Calcium 9.2 06/23/2017 11:47 AM      Wt Readings from Last 3 Encounters:   02/01/18 223 lb 12.8 oz (101.5 kg)   06/23/17 235 lb 14.3 oz (107 kg)   01/12/17 238 lb (108 kg)      BP Readings from Last 3 Encounters:   02/01/18 124/72   06/23/17 (!) 175/105   01/12/17 130/80      Current Outpatient Prescriptions   Medication Sig    carBAMazepine (TEGRETOL) 200 mg tablet Take 200 mg by mouth three (3) times daily.  chlorthalidone (HYGROTEN) 25 mg tablet Take  by mouth daily.  cloNIDine HCl (CATAPRES) 0.1 mg tablet Take 0.2 mg by mouth two (2) times a day.  insulin lispro protamine/insulin lispro (HUMALOG MIX 75-25) 100 unit/mL (75-25) injection 35 Units by SubCUTAneous route two (2) times a day.  valsartan (DIOVAN) 160 mg tablet Take  by mouth daily.  omeprazole (PRILOSEC) 40 mg capsule Take 40 mg by mouth daily.  QUEtiapine (SEROQUEL) 25 mg tablet Take  by mouth nightly.  lisdexamfetamine (VYVANSE) 20 mg capsule Take by mouth daily.  cyclobenzaprine (FLEXERIL) 10 mg tablet Take 10 mg by mouth as needed.  amLODIPine (NORVASC) 10 mg tablet Take 1 Tab by mouth Daily (before dinner).  atorvastatin (LIPITOR) 40 mg tablet Take 2 Tabs by mouth nightly.  clopidogrel (PLAVIX) 75 mg tablet Take 1 Tab by mouth daily.  tamsulosin (FLOMAX) 0.4 mg capsule Take 0.4 mg by mouth every evening.  traZODone (DESYREL) 50 mg tablet Take 50 mg by mouth as needed.  gabapentin (NEURONTIN) 600 mg tablet Take 600 mg by mouth two (2) times a day.  cholecalciferol, VITAMIN D3, (VITAMIN D3) 5,000 unit tab tablet Take 5,000 Units by mouth daily.  sildenafil citrate (VIAGRA) 50 mg tablet Take 1 Tab by mouth as needed.  metFORMIN (GLUCOPHAGE) 500 mg tablet Take 1,500 mg by mouth daily.  FLUoxetine (PROZAC) 20 mg capsule Take 20 mg by mouth daily.     pantoprazole (PROTONIX) 40 mg tablet Take 40 mg by mouth daily.  trolamine salicylate (MYOFLEX) 10 % topical cream Apply  to affected area as needed.  insulin lispro protamine/insulin lispro (HUMALOG MIX 75/25) 100 unit/mL (75-25) injection 40 Units by SubCUTAneous route two (2) times a day. Prior to breakfast in the morning and at bedtime    coenzyme q10-vitamin e 100-100 mg-unit cap Take 1 Cap by mouth daily.  acetaminophen (TYLENOL) 500 mg tablet Take 1,000 mg by mouth daily as needed for Pain.  multivitamin (ONE A DAY) tablet Take 1 Tab by mouth daily.  losartan (COZAAR) 100 mg tablet Take 1 Tab by mouth daily.  metoprolol-XL (TOPROL-XL) 100 mg XL tablet Take 1 Tab by mouth daily. No current facility-administered medications for this visit. Impression see above.       Written by Hudson Arevalo, as dictated by Naveed Allen MD.

## 2018-06-02 ENCOUNTER — HOSPITAL ENCOUNTER (OUTPATIENT)
Age: 53
Setting detail: OBSERVATION
Discharge: HOME | End: 2018-06-04
Attending: EMERGENCY MEDICINE | Admitting: INTERNAL MEDICINE
Payer: COMMERCIAL

## 2018-06-02 ENCOUNTER — APPOINTMENT (OUTPATIENT)
Dept: GENERAL RADIOLOGY | Age: 53
End: 2018-06-02
Attending: EMERGENCY MEDICINE
Payer: COMMERCIAL

## 2018-06-02 ENCOUNTER — APPOINTMENT (OUTPATIENT)
Dept: CT IMAGING | Age: 53
End: 2018-06-02
Attending: EMERGENCY MEDICINE
Payer: COMMERCIAL

## 2018-06-02 ENCOUNTER — APPOINTMENT (OUTPATIENT)
Dept: NUCLEAR MEDICINE | Age: 53
End: 2018-06-02
Attending: EMERGENCY MEDICINE
Payer: COMMERCIAL

## 2018-06-02 DIAGNOSIS — I95.1 ORTHOSTATIC HYPOTENSION: ICD-10-CM

## 2018-06-02 DIAGNOSIS — E11.9 DIABETES MELLITUS TYPE 2 IN NONOBESE (HCC): ICD-10-CM

## 2018-06-02 DIAGNOSIS — I26.99 OTHER ACUTE PULMONARY EMBOLISM WITHOUT ACUTE COR PULMONALE (HCC): Primary | ICD-10-CM

## 2018-06-02 DIAGNOSIS — R53.1 GENERALIZED WEAKNESS: ICD-10-CM

## 2018-06-02 DIAGNOSIS — R09.02 HYPOXIA: ICD-10-CM

## 2018-06-02 PROBLEM — Z86.73 H/O: STROKE: Status: ACTIVE | Noted: 2018-06-02

## 2018-06-02 PROBLEM — I69.392 FACIAL DROOP DUE TO OLD STROKE: Status: ACTIVE | Noted: 2018-06-02

## 2018-06-02 PROBLEM — I10 HTN (HYPERTENSION): Status: ACTIVE | Noted: 2018-06-02

## 2018-06-02 LAB
ALBUMIN SERPL-MCNC: 3.5 G/DL (ref 3.5–5)
ALBUMIN/GLOB SERPL: 1.1 {RATIO} (ref 1.1–2.2)
ALP SERPL-CCNC: 119 U/L (ref 45–117)
ALT SERPL-CCNC: 20 U/L (ref 12–78)
ANION GAP SERPL CALC-SCNC: 9 MMOL/L (ref 5–15)
APPEARANCE UR: CLEAR
AST SERPL-CCNC: 16 U/L (ref 15–37)
ATRIAL RATE: 62 BPM
BACTERIA URNS QL MICRO: ABNORMAL /HPF
BASOPHILS # BLD: 0.1 K/UL (ref 0–0.1)
BASOPHILS NFR BLD: 1 % (ref 0–1)
BILIRUB SERPL-MCNC: 0.3 MG/DL (ref 0.2–1)
BILIRUB UR QL CFM: NEGATIVE
BNP SERPL-MCNC: 307 PG/ML (ref 0–125)
BUN SERPL-MCNC: 21 MG/DL (ref 6–20)
BUN/CREAT SERPL: 19 (ref 12–20)
CALCIUM SERPL-MCNC: 8.8 MG/DL (ref 8.5–10.1)
CALCULATED P AXIS, ECG09: 28 DEGREES
CALCULATED R AXIS, ECG10: 169 DEGREES
CALCULATED T AXIS, ECG11: 7 DEGREES
CHLORIDE SERPL-SCNC: 101 MMOL/L (ref 97–108)
CK MB CFR SERPL CALC: 2.9 % (ref 0–2.5)
CK MB SERPL-MCNC: 2.4 NG/ML (ref 5–25)
CK SERPL-CCNC: 84 U/L (ref 39–308)
CO2 SERPL-SCNC: 27 MMOL/L (ref 21–32)
COLOR UR: ABNORMAL
CREAT SERPL-MCNC: 1.1 MG/DL (ref 0.7–1.3)
DIAGNOSIS, 93000: NORMAL
DIFFERENTIAL METHOD BLD: NORMAL
EOSINOPHIL # BLD: 0.1 K/UL (ref 0–0.4)
EOSINOPHIL NFR BLD: 1 % (ref 0–7)
EPITH CASTS URNS QL MICRO: ABNORMAL /LPF
ERYTHROCYTE [DISTWIDTH] IN BLOOD BY AUTOMATED COUNT: 12.9 % (ref 11.5–14.5)
GLOBULIN SER CALC-MCNC: 3.1 G/DL (ref 2–4)
GLUCOSE BLD STRIP.AUTO-MCNC: 266 MG/DL (ref 65–100)
GLUCOSE BLD STRIP.AUTO-MCNC: 273 MG/DL (ref 65–100)
GLUCOSE SERPL-MCNC: 209 MG/DL (ref 65–100)
GLUCOSE UR STRIP.AUTO-MCNC: 250 MG/DL
HCT VFR BLD AUTO: 41.1 % (ref 36.6–50.3)
HGB BLD-MCNC: 14.6 G/DL (ref 12.1–17)
HGB UR QL STRIP: NEGATIVE
HYALINE CASTS URNS QL MICRO: ABNORMAL /LPF (ref 0–5)
IMM GRANULOCYTES # BLD: 0 K/UL (ref 0–0.04)
IMM GRANULOCYTES NFR BLD AUTO: 0 % (ref 0–0.5)
INR PPP: 1.1 (ref 0.9–1.1)
KETONES UR QL STRIP.AUTO: NEGATIVE MG/DL
LACTATE SERPL-SCNC: 1.9 MMOL/L (ref 0.4–2)
LEUKOCYTE ESTERASE UR QL STRIP.AUTO: NEGATIVE
LYMPHOCYTES # BLD: 1.9 K/UL (ref 0.8–3.5)
LYMPHOCYTES NFR BLD: 19 % (ref 12–49)
MCH RBC QN AUTO: 29.1 PG (ref 26–34)
MCHC RBC AUTO-ENTMCNC: 35.5 G/DL (ref 30–36.5)
MCV RBC AUTO: 82 FL (ref 80–99)
MONOCYTES # BLD: 0.7 K/UL (ref 0–1)
MONOCYTES NFR BLD: 7 % (ref 5–13)
MUCOUS THREADS URNS QL MICRO: ABNORMAL /LPF
NEUTS SEG # BLD: 7.3 K/UL (ref 1.8–8)
NEUTS SEG NFR BLD: 73 % (ref 32–75)
NITRITE UR QL STRIP.AUTO: NEGATIVE
NRBC # BLD: 0 K/UL (ref 0–0.01)
NRBC BLD-RTO: 0 PER 100 WBC
P-R INTERVAL, ECG05: 156 MS
PH UR STRIP: 5.5 [PH] (ref 5–8)
PLATELET # BLD AUTO: 196 K/UL (ref 150–400)
PMV BLD AUTO: 9.6 FL (ref 8.9–12.9)
POTASSIUM SERPL-SCNC: 3.6 MMOL/L (ref 3.5–5.1)
PROT SERPL-MCNC: 6.6 G/DL (ref 6.4–8.2)
PROT UR STRIP-MCNC: 300 MG/DL
PROTHROMBIN TIME: 11.4 SEC (ref 9–11.1)
Q-T INTERVAL, ECG07: 456 MS
QRS DURATION, ECG06: 152 MS
QTC CALCULATION (BEZET), ECG08: 462 MS
RBC # BLD AUTO: 5.01 M/UL (ref 4.1–5.7)
RBC #/AREA URNS HPF: ABNORMAL /HPF (ref 0–5)
SERVICE CMNT-IMP: ABNORMAL
SERVICE CMNT-IMP: ABNORMAL
SODIUM SERPL-SCNC: 137 MMOL/L (ref 136–145)
SP GR UR REFRACTOMETRY: 1.03 (ref 1–1.03)
TROPONIN I SERPL-MCNC: <0.04 NG/ML
UROBILINOGEN UR QL STRIP.AUTO: 1 EU/DL (ref 0.2–1)
VENTRICULAR RATE, ECG03: 62 BPM
WBC # BLD AUTO: 10 K/UL (ref 4.1–11.1)
WBC URNS QL MICRO: ABNORMAL /HPF (ref 0–4)

## 2018-06-02 PROCEDURE — A9540 TC99M MAA: HCPCS

## 2018-06-02 PROCEDURE — 65660000000 HC RM CCU STEPDOWN

## 2018-06-02 PROCEDURE — 96360 HYDRATION IV INFUSION INIT: CPT

## 2018-06-02 PROCEDURE — 99285 EMERGENCY DEPT VISIT HI MDM: CPT

## 2018-06-02 PROCEDURE — 74011636637 HC RX REV CODE- 636/637: Performed by: INTERNAL MEDICINE

## 2018-06-02 PROCEDURE — 36415 COLL VENOUS BLD VENIPUNCTURE: CPT | Performed by: EMERGENCY MEDICINE

## 2018-06-02 PROCEDURE — 96372 THER/PROPH/DIAG INJ SC/IM: CPT

## 2018-06-02 PROCEDURE — 81001 URINALYSIS AUTO W/SCOPE: CPT | Performed by: EMERGENCY MEDICINE

## 2018-06-02 PROCEDURE — 74011250636 HC RX REV CODE- 250/636: Performed by: EMERGENCY MEDICINE

## 2018-06-02 PROCEDURE — 82962 GLUCOSE BLOOD TEST: CPT

## 2018-06-02 PROCEDURE — 70450 CT HEAD/BRAIN W/O DYE: CPT

## 2018-06-02 PROCEDURE — 85610 PROTHROMBIN TIME: CPT | Performed by: EMERGENCY MEDICINE

## 2018-06-02 PROCEDURE — 85025 COMPLETE CBC W/AUTO DIFF WBC: CPT | Performed by: EMERGENCY MEDICINE

## 2018-06-02 PROCEDURE — 83605 ASSAY OF LACTIC ACID: CPT | Performed by: EMERGENCY MEDICINE

## 2018-06-02 PROCEDURE — 87040 BLOOD CULTURE FOR BACTERIA: CPT | Performed by: EMERGENCY MEDICINE

## 2018-06-02 PROCEDURE — 93005 ELECTROCARDIOGRAM TRACING: CPT

## 2018-06-02 PROCEDURE — 84484 ASSAY OF TROPONIN QUANT: CPT | Performed by: EMERGENCY MEDICINE

## 2018-06-02 PROCEDURE — 99218 HC RM OBSERVATION: CPT

## 2018-06-02 PROCEDURE — 96361 HYDRATE IV INFUSION ADD-ON: CPT

## 2018-06-02 PROCEDURE — 74011250637 HC RX REV CODE- 250/637: Performed by: INTERNAL MEDICINE

## 2018-06-02 PROCEDURE — 71045 X-RAY EXAM CHEST 1 VIEW: CPT

## 2018-06-02 PROCEDURE — 74011250636 HC RX REV CODE- 250/636: Performed by: INTERNAL MEDICINE

## 2018-06-02 PROCEDURE — 83880 ASSAY OF NATRIURETIC PEPTIDE: CPT | Performed by: EMERGENCY MEDICINE

## 2018-06-02 PROCEDURE — 82550 ASSAY OF CK (CPK): CPT | Performed by: EMERGENCY MEDICINE

## 2018-06-02 PROCEDURE — 80053 COMPREHEN METABOLIC PANEL: CPT | Performed by: EMERGENCY MEDICINE

## 2018-06-02 PROCEDURE — 82553 CREATINE MB FRACTION: CPT | Performed by: EMERGENCY MEDICINE

## 2018-06-02 RX ORDER — CYCLOBENZAPRINE HCL 10 MG
10 TABLET ORAL
Status: DISCONTINUED | OUTPATIENT
Start: 2018-06-02 | End: 2018-06-04 | Stop reason: HOSPADM

## 2018-06-02 RX ORDER — INSULIN LISPRO 100 [IU]/ML
5 INJECTION, SOLUTION INTRAVENOUS; SUBCUTANEOUS
Status: DISCONTINUED | OUTPATIENT
Start: 2018-06-02 | End: 2018-06-04 | Stop reason: HOSPADM

## 2018-06-02 RX ORDER — INSULIN GLARGINE 100 [IU]/ML
42 INJECTION, SOLUTION SUBCUTANEOUS
Status: DISCONTINUED | OUTPATIENT
Start: 2018-06-02 | End: 2018-06-04 | Stop reason: HOSPADM

## 2018-06-02 RX ORDER — CHLORTHALIDONE 25 MG/1
25 TABLET ORAL DAILY
Status: DISCONTINUED | OUTPATIENT
Start: 2018-06-03 | End: 2018-06-04 | Stop reason: HOSPADM

## 2018-06-02 RX ORDER — ATORVASTATIN CALCIUM 40 MG/1
80 TABLET, FILM COATED ORAL
Status: DISCONTINUED | OUTPATIENT
Start: 2018-06-02 | End: 2018-06-04 | Stop reason: HOSPADM

## 2018-06-02 RX ORDER — MIRTAZAPINE 15 MG/1
15 TABLET, FILM COATED ORAL
Status: DISCONTINUED | OUTPATIENT
Start: 2018-06-02 | End: 2018-06-04 | Stop reason: HOSPADM

## 2018-06-02 RX ORDER — CARBAMAZEPINE 200 MG/1
200 TABLET ORAL 2 TIMES DAILY
Status: DISCONTINUED | OUTPATIENT
Start: 2018-06-02 | End: 2018-06-04 | Stop reason: HOSPADM

## 2018-06-02 RX ORDER — TRAZODONE HYDROCHLORIDE 50 MG/1
50 TABLET ORAL
Status: DISCONTINUED | OUTPATIENT
Start: 2018-06-02 | End: 2018-06-04 | Stop reason: HOSPADM

## 2018-06-02 RX ORDER — ACETAMINOPHEN 500 MG
500 TABLET ORAL
Status: DISCONTINUED | OUTPATIENT
Start: 2018-06-02 | End: 2018-06-04 | Stop reason: HOSPADM

## 2018-06-02 RX ORDER — SODIUM CHLORIDE 0.9 % (FLUSH) 0.9 %
5-10 SYRINGE (ML) INJECTION AS NEEDED
Status: DISCONTINUED | OUTPATIENT
Start: 2018-06-02 | End: 2018-06-04 | Stop reason: HOSPADM

## 2018-06-02 RX ORDER — ONDANSETRON 2 MG/ML
4 INJECTION INTRAMUSCULAR; INTRAVENOUS
Status: DISCONTINUED | OUTPATIENT
Start: 2018-06-02 | End: 2018-06-04 | Stop reason: HOSPADM

## 2018-06-02 RX ORDER — VALSARTAN 160 MG/1
320 TABLET ORAL DAILY
Status: DISCONTINUED | OUTPATIENT
Start: 2018-06-03 | End: 2018-06-04 | Stop reason: HOSPADM

## 2018-06-02 RX ORDER — AMLODIPINE BESYLATE 5 MG/1
15 TABLET ORAL
Status: DISCONTINUED | OUTPATIENT
Start: 2018-06-02 | End: 2018-06-04 | Stop reason: HOSPADM

## 2018-06-02 RX ORDER — MORPHINE SULFATE 15 MG/1
15 TABLET ORAL
Status: DISCONTINUED | OUTPATIENT
Start: 2018-06-02 | End: 2018-06-04 | Stop reason: HOSPADM

## 2018-06-02 RX ORDER — NALOXONE HYDROCHLORIDE 0.4 MG/ML
0.4 INJECTION, SOLUTION INTRAMUSCULAR; INTRAVENOUS; SUBCUTANEOUS AS NEEDED
Status: DISCONTINUED | OUTPATIENT
Start: 2018-06-02 | End: 2018-06-04 | Stop reason: HOSPADM

## 2018-06-02 RX ORDER — MIRTAZAPINE 15 MG/1
15 TABLET, FILM COATED ORAL
COMMUNITY

## 2018-06-02 RX ORDER — INSULIN LISPRO 100 [IU]/ML
INJECTION, SOLUTION INTRAVENOUS; SUBCUTANEOUS
COMMUNITY
End: 2019-11-14

## 2018-06-02 RX ORDER — METOPROLOL SUCCINATE 100 MG/1
100 TABLET, EXTENDED RELEASE ORAL DAILY
COMMUNITY

## 2018-06-02 RX ORDER — SODIUM CHLORIDE 0.9 % (FLUSH) 0.9 %
5-10 SYRINGE (ML) INJECTION EVERY 8 HOURS
Status: DISCONTINUED | OUTPATIENT
Start: 2018-06-02 | End: 2018-06-04 | Stop reason: HOSPADM

## 2018-06-02 RX ORDER — TAMSULOSIN HYDROCHLORIDE 0.4 MG/1
0.4 CAPSULE ORAL EVERY EVENING
Status: DISCONTINUED | OUTPATIENT
Start: 2018-06-02 | End: 2018-06-04 | Stop reason: HOSPADM

## 2018-06-02 RX ORDER — ENOXAPARIN SODIUM 100 MG/ML
1 INJECTION SUBCUTANEOUS EVERY 12 HOURS
Status: DISCONTINUED | OUTPATIENT
Start: 2018-06-02 | End: 2018-06-03

## 2018-06-02 RX ORDER — QUETIAPINE FUMARATE 25 MG/1
50 TABLET, FILM COATED ORAL
Status: DISCONTINUED | OUTPATIENT
Start: 2018-06-02 | End: 2018-06-04 | Stop reason: HOSPADM

## 2018-06-02 RX ORDER — METOPROLOL SUCCINATE 50 MG/1
100 TABLET, EXTENDED RELEASE ORAL DAILY
Status: DISCONTINUED | OUTPATIENT
Start: 2018-06-03 | End: 2018-06-04 | Stop reason: HOSPADM

## 2018-06-02 RX ORDER — HYDROCODONE BITARTRATE AND ACETAMINOPHEN 5; 325 MG/1; MG/1
1 TABLET ORAL
Status: DISCONTINUED | OUTPATIENT
Start: 2018-06-02 | End: 2018-06-04 | Stop reason: HOSPADM

## 2018-06-02 RX ORDER — CLONIDINE HYDROCHLORIDE 0.1 MG/1
0.4 TABLET ORAL 2 TIMES DAILY
Status: DISCONTINUED | OUTPATIENT
Start: 2018-06-02 | End: 2018-06-04 | Stop reason: HOSPADM

## 2018-06-02 RX ORDER — ENOXAPARIN SODIUM 100 MG/ML
1 INJECTION SUBCUTANEOUS
Status: COMPLETED | OUTPATIENT
Start: 2018-06-02 | End: 2018-06-02

## 2018-06-02 RX ORDER — BISACODYL 5 MG
5 TABLET, DELAYED RELEASE (ENTERIC COATED) ORAL DAILY PRN
Status: DISCONTINUED | OUTPATIENT
Start: 2018-06-02 | End: 2018-06-04 | Stop reason: HOSPADM

## 2018-06-02 RX ORDER — CLOPIDOGREL BISULFATE 75 MG/1
75 TABLET ORAL DAILY
Status: DISCONTINUED | OUTPATIENT
Start: 2018-06-03 | End: 2018-06-04 | Stop reason: HOSPADM

## 2018-06-02 RX ADMIN — CARBAMAZEPINE 200 MG: 200 TABLET ORAL at 17:31

## 2018-06-02 RX ADMIN — QUETIAPINE FUMARATE 50 MG: 25 TABLET ORAL at 21:34

## 2018-06-02 RX ADMIN — ENOXAPARIN SODIUM 100 MG: 100 INJECTION SUBCUTANEOUS at 22:37

## 2018-06-02 RX ADMIN — ATORVASTATIN CALCIUM 80 MG: 40 TABLET, FILM COATED ORAL at 21:34

## 2018-06-02 RX ADMIN — SODIUM CHLORIDE 1000 ML: 900 INJECTION, SOLUTION INTRAVENOUS at 07:25

## 2018-06-02 RX ADMIN — SODIUM CHLORIDE 500 ML: 900 INJECTION, SOLUTION INTRAVENOUS at 09:42

## 2018-06-02 RX ADMIN — Medication 10 ML: at 21:35

## 2018-06-02 RX ADMIN — Medication 10 ML: at 17:33

## 2018-06-02 RX ADMIN — CLONIDINE HYDROCHLORIDE 0.4 MG: 0.1 TABLET ORAL at 17:31

## 2018-06-02 RX ADMIN — INSULIN GLARGINE 42 UNITS: 100 INJECTION, SOLUTION SUBCUTANEOUS at 21:34

## 2018-06-02 RX ADMIN — MIRTAZAPINE 15 MG: 15 TABLET, FILM COATED ORAL at 21:34

## 2018-06-02 RX ADMIN — INSULIN LISPRO 5 UNITS: 100 INJECTION, SOLUTION INTRAVENOUS; SUBCUTANEOUS at 17:30

## 2018-06-02 RX ADMIN — TAMSULOSIN HYDROCHLORIDE 0.4 MG: 0.4 CAPSULE ORAL at 17:31

## 2018-06-02 RX ADMIN — ENOXAPARIN SODIUM 100 MG: 100 INJECTION SUBCUTANEOUS at 10:48

## 2018-06-02 NOTE — ED NOTES
TRANSFER - OUT REPORT:    Verbal report given to Desiree Live RN(name) on [facility]  being transferred to [place] [id number](unit) for routine progression of care       Report consisted of patients Situation, Background, Assessment and   Recommendations(SBAR). Information from the following report(s) SBAR, ED Summary, [facility] and Recent Results was reviewed with the receiving nurse. Lines:   Peripheral IV 06/02/18 Right Hand (Active)   Site Assessment Clean, dry, & intact 6/2/2018  7:02 AM   Phlebitis Assessment 0 6/2/2018  7:02 AM   Dressing Status Clean, dry, & intact 6/2/2018  7:02 AM        Opportunity for questions and clarification was provided.       Patient transported with:   O2 @ 4 liters

## 2018-06-02 NOTE — IP AVS SNAPSHOT
[address] [facility] 
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 
[phone] Roxy Malloy NP Go on 2018 Hospital follow-up scheduled at 10:00am (If you have questions or need to reschedule please call [phone] [address] [facility] 
[phone] Critical access hospital On 2018 Expect a phone call from [place] to schedule a follow up visit with you in 24-48 hours. [facility] works with [address] [place] [address] and [facility] to provide patients with improved access to quality acute care services. They strive to improve patient outcomes while helping to prevent hospital readmissions. Contact #[phone] Discharge Orders None A check juan luis indicates which time of day the medication should be taken. My Medications START taking these medications Instructions Each Dose to Equal  
 Morning Noon Evening Bedtime  
 enoxaparin injection Commonly known as:  LOVENOX Start taking on:  2018 Your last dose was: Your next dose is:    
   
   
 150 mg by SubCUTAneous route daily for 7 days. 150 mg  
    
   
   
   
  
 warfarin 5 mg tablet Commonly known as:  COUMADIN Your last dose was: Your next dose is: Take 1 Tab by mouth nightly for 60 days. 5 mg CHANGE how you take these medications Instructions Each Dose to Equal  
 Morning Noon Evening Bedtime  
 amLODIPine 10 mg tablet Commonly known as:  Marck Posada What changed:  how much to take Your last dose was: Your next dose is: Take 1 Tab by mouth Daily (before dinner). 10 mg CONTINUE taking these medications Instructions Each Dose to Equal  
 Morning Noon Evening Bedtime  
 atorvastatin 40 mg tablet Commonly known as:  LIPITOR Your last dose was: Your next dose is: Take 2 Tabs by mouth nightly. 80 mg  
    
   
   
   
  
 chlorthalidone 25 mg tablet Commonly known as:  Betzaida Kelp Your last dose was: Your next dose is: Take  by mouth daily. cloNIDine HCl 0.1 mg tablet Commonly known as:  CATAPRES Your last dose was: Your next dose is: Take 0.4 mg by mouth two (2) times a day. 0.4 mg  
    
   
   
   
  
 clopidogrel 75 mg Tab Commonly known as:  PLAVIX Your last dose was: Your next dose is: Take 1 Tab by mouth daily. 75 mg  
    
   
   
   
  
 cyclobenzaprine 10 mg tablet Commonly known as:  FLEXERIL Your last dose was: Your next dose is: Take 10 mg by mouth as needed. 10 mg  
    
   
   
   
  
 DIOVAN 160 mg tablet Generic drug:  valsartan Your last dose was: Your next dose is: Take 320 mg by mouth daily. 320 mg HumaLOG Mix 75-25(U-100)Insuln 100 unit/mL (75-25) injection Generic drug:  insulin lispro protamine/insulin lispro Your last dose was: Your next dose is:    
   
   
 35 Units by SubCUTAneous route two (2) times a day. 35 Units HumaLOG U-100 Insulin 100 unit/mL injection Generic drug:  insulin lispro Your last dose was: Your next dose is:    
   
   
 by SubCUTAneous route four (4) times daily as needed (sliding scale). metoprolol succinate 100 mg tablet Commonly known as:  TOPROL-XL Your last dose was: Your next dose is: Take 100 mg by mouth daily. 100 mg REMERON 15 mg tablet Generic drug:  mirtazapine Your last dose was: Your next dose is: Take  by mouth nightly. SEROquel 25 mg tablet Generic drug:  QUEtiapine Your last dose was: Your next dose is: Take 50 mg by mouth nightly as needed. 50 mg  
    
   
   
   
  
 tamsulosin 0.4 mg capsule Commonly known as:  FLOMAX Your last dose was: Your next dose is: Take 0.4 mg by mouth every evening. 0.4 mg  
    
   
   
   
  
 TEGretol 200 mg tablet Generic drug:  carBAMazepine Your last dose was: Your next dose is: Take 200 mg by mouth two (2) times a day. 200 mg  
    
   
   
   
  
 traZODone 50 mg tablet Commonly known as:  Teryl Atmore Your last dose was: Your next dose is: Take 50 mg by mouth as needed. 50 mg  
    
   
   
   
  
 VYVANSE 20 mg capsule Generic drug:  lisdexamfetamine Your last dose was: Your next dose is: Take 50 mg by mouth daily. 50 mg Where to Get Your Medications Information on where to get these meds will be given to you by the nurse or doctor. ! Ask your nurse or doctor about these medications  
  enoxaparin injection  
 warfarin 5 mg tablet Discharge Instructions [employer] Activation Thank you for requesting access to [employer].  Please follow the instructions below to securely access and download your online medical record. [employer] allows you to send messages to your doctor, view your test results, renew your prescriptions, schedule appointments, and more. How Do I Sign Up? 1. In your internet browser, go to [email or web address] 
2. Click on the First Time User? Click Here link in the Sign In box. You will be redirect to the New Member Sign Up page. 3. Enter your [employer] Access Code exactly as it appears below. You will not need to use this code after youve completed the sign-up process. If you do not sign up before the expiration date, you must request a new code. [employer] Access Code: Activation code not generated Current [email or web address] Status: Active (This is the date your [employer] access code will ) 4. Enter the last four digits of your Social Security Number (xxxx) and Date of Birth (mm/dd/yyyy) as indicated and click Submit. You will be taken to the next sign-up page. 5. Create a [employer] ID. This will be your [employer] login ID and cannot be changed, so think of one that is secure and easy to remember. 6. Create a [employer] password. You can change your password at any time. 7. Enter your Password Reset Question and Answer. This can be used at a later time if you forget your password. 8. Enter your e-mail address. You will receive e-mail notification when new information is available in [address] 9. Click Sign Up. You can now view and download portions of your medical record. 10. Click the Download Summary menu link to download a portable copy of your medical information. Additional Information If you have questions, please visit the Frequently Asked Questions section of the [email or web address] website at [email or web address] Remember, [email or web address] is NOT to be used for urgent needs. For medical emergencies, dial 911. HOSPITALIST DISCHARGE INSTRUCTIONS 
 
NAME: Xavier Landry :  1965 MRN:  [id number] Date/Time:  6/4/2018 10:28 AM 
 
ADMIT DATE: 6/2/2018 DISCHARGE DATE: 6/4/2018 · It is important that you take the medication exactly as they are prescribed. · Keep your medication in the bottles provided by the pharmacist and keep a list of the medication names, dosages, and times to be taken in your wallet. · Do not take other medications without consulting your doctor. What to do at [facility] Recommended diet:  Cardiac Diet Recommended activity: Activity as tolerated If you have questions regarding the hospital related prescriptions or hospital related issues please call [facility] at [phone]. You can always direct your questions to your primary care doctor if you are unable to reach your hospital physician; your PCP works as an extension of your hospital doctor just like your hospital doctor is an extension of your PCP for your time at the hospital [facility]) If you experience any of the following symptoms then please call your primary care physician or return to the emergency room if you cannot get hold of your doctor: 
 
Fever, chills, nausea, vomiting, or persistent diarrhea Worsening weakness or new problems with your speech or balance Dark stools or visible blood in your stools New Leg swelling or shortness of breath as these could be signs of a clot Additional Instructions: 
 
 
Have your PT INR checked every Monday Wednesday and Friday and have the results sent to your doctor as your warfarin dose will need adjustments to achieve a target PT INR level of between 2-3. Information obtained by : 
I understand that if any problems occur once I am at home I am to contact my physician. I understand and acknowledge receipt of the instructions indicated above. Physician's or R.N.'s Signature                                                                  Date/Time Patient or Representative Signature [employer] Announcement We are excited to announce that we are making your provider's discharge notes available to you in [employer]. You will see these notes when they are completed and signed by the physician that discharged you from your recent hospital stay. If you have any questions or concerns about any information you see in [employer], please call the Health Information Department where you were seen or reach out to your Primary Care Provider for more information about your plan of care. Introducing [facility]! Dear Kellie Alicia: 
Thank you for requesting a [employer] account. Our records indicate that you already have an active [employer] account. You can access your account anytime at [email or web address] Did you know that you can access your hospital and ER discharge instructions at any time in [employer]? You can also review all of your test results from your hospital stay or ER visit. Additional Information If you have questions, please visit the Frequently Asked Questions section of the [employer] website at [email or web address] Remember, [employer] is NOT to be used for urgent needs. For medical emergencies, dial 911. Now available from your iPhone and Android! Introducing Scotty Armenta As a [facility] patient, I wanted to make you aware of our electronic visit tool called Scotty Armenta. [facility] 24/7 allows you to connect within minutes with a medical provider 24 hours a day, seven days a week via a mobile device or tablet or logging into a secure website from your computer.   You can access [employer] 24/7 from anywhere in the [place]. A virtual visit might be right for you when you have a simple condition and feel like you just dont want to get out of bed, or cant get away from work for an appointment, when your regular [employer] provider is not available (evenings, weekends or holidays), or when youre out of town and need minor care. Electronic visits cost only $49 and if the [employer] 24/7 provider determines a prescription is needed to treat your condition, one can be electronically transmitted to a nearby pharmacy*. Please take a moment to enroll today if you have not already done so. The enrollment process is free and takes just a few minutes. To enroll, please download the [employer] 24/7 agustin to your tablet or phone, or visit [email or web address] to enroll on your computer. And, as an [address] [place] [address] patient with a [employer] account, the results of your visits will be scanned into your electronic medical record and your primary care provider will be able to view the scanned results. We urge you to continue to see your regular [employer] provider for your ongoing medical care. And while your primary care provider may not be the one available when you seek a [employer] virtual visit, the peace of mind you get from getting a real diagnosis real time can be priceless. For more information on [employer], view our Frequently Asked Questions (FAQs) at [email or web address]. Sincerely, 
 
Aranza Blair MD 
Chief Medical Officer Maria8 Kacy Gan *:  certain medications cannot be prescribed via [employer] Unresulted Labs-Please follow up with your PCP about these lab tests Order Current Status CULTURE, BLOOD, PAIRED Preliminary result Providers Seen During Your Hospitalization Provider Specialty Primary office phone Zak Sanchez MD Emergency Medicine [phone] Connor Kitchen MD Internal Medicine [phone] Your Primary Care Physician (PCP) Primary Care Physician Office Phone Office Fax Narciso Acharya [phone] You are allergic to the following Allergen Reactions Iodinated Contrast- Oral And Iv Dye Anaphylaxis Recent Documentation Height Weight BMI Smoking Status 1.829 m 101.6 kg 30.38 kg/m2 Never Smoker Emergency Contacts Name Discharge Info Relation Home Work Mobile Fronie All CAREGIVER [3] Spouse [3] [phone] Samuel Grater  Spouse [3] [phone] Patient Belongings The following personal items are in your possession at time of discharge: 
  Dental Appliances: None  Visual Aid: None      Home Medications: Sent home   Jewelry: Necklace  Clothing: Footwear, Pants, Shirt, Socks, Undergarments    Other Valuables: [employer] Please provide this summary of care documentation to your next provider. Signatures-by signing, you are acknowledging that this After Visit Summary has been reviewed with you and you have received a copy. Patient Signature:  ____________________________________________________________ Date:  ____________________________________________________________  
  
[facility] Provider Signature:  ____________________________________________________________ Date:  ____________________________________________________________

## 2018-06-02 NOTE — H&P
Hospitalist Admission Note    NAME: Yesica Heart   :  1965   MRN:  [id number]     Date/Time:  2018 9:51 AM    Patient PCP: Beverly Gerard MD  ______________________________________________________________________  Given the patient's current clinical presentation, I have a high level of concern for decompensation if discharged from the emergency department. Complex decision making was performed, which includes reviewing the patient's available past medical records, laboratory results, and x-ray films. My assessment of this patient's clinical condition and my plan of care is as follows. Assessment / Plan:  Hypoxia, Dyspnea while ambulating, general weakness and heaviness, and atypical chest pain. pe highly suspected, 1mg/kg lovenox, pul consult due to prior lung issues, cardiac diet, bedrest for 24hours. H/o Indeterminate oval density in the right mid chest on CXR secondary to pulmonary aspergillosis sees Dr. Marbella Oliveira. This is in a similar position to airspace disease in the previous study . Recommend follow-up in 2-3 weeks to ensure resolution. Tested JAKE which was negative. H/o left basilar stroke may 2016 with TPA    Type 2 dm with htn, ckd, h/o  inferior stemi with stenting    H/o thoracic outlet syndrome    H/o depression, chronic anxiety    Code Status: full  Surrogate Decision MakerQuitman Mediate, phone [phone]    DVT Prophylaxis: therapeutic lovenox  GI Prophylaxis: not indicated    Baseline: walks well, independent. Subjective:   CHIEF COMPLAINT: feeling of heaviness    HISTORY OF PRESENT ILLNESS:     Anton Betts is a 52 y.o.  male who presents with feeling heavy and shortness of breath, feeling sluggish, [place] Pt sat down, and ambulance was called. Pt denies long sitting periods prior to episode, edema, pain in legs.  Pt does admit to vague left mid clavicular chest pain, sharp in nature, worse with deep inspiration, resting made it better, non radiating lasting only seconds at a time for the past month. Pt also complains of gasping for air that woke him out of sleep the past month. Pt denies prior pul embolism, and is only on plavix. Pt was tested for lupus which was negative by cardiologist in past.     In [place], pt had high prob vq scan. We were asked to admit for work up and evaluation of the above problems. Past Medical History:   Diagnosis Date    BPH (benign prostatic hyperplasia)     CAD (coronary artery disease)     multivessel    CKD (chronic kidney disease), stage III     DM type 2 causing neurological disease ([address]     DM type 2 causing renal disease [facility] [address]     DM type 2 causing vascular disease (HCC)     Dyslipidemia, goal LDL below 79     Essential hypertension, benign     History of acute inferior wall MI 5/30/2017    Lupus     Neuropathy     Thoracic outlet syndrome         Past Surgical History:   Procedure Laterality Date    HX ORTHOPAEDIC  1996    L. knee arthroscopy       Social History   Substance Use Topics    Smoking status: Never Smoker    Smokeless tobacco: Never Used    Alcohol use Yes        Family History   Problem Relation Age of Onset    Heart Disease Mother     Stroke Mother     Diabetes Father     Stroke Father     Heart Disease Father      Allergies   Allergen Reactions    Iodinated Contrast- Oral And Iv Dye Anaphylaxis        Prior to Admission medications    Medication Sig Start Date End Date Taking? Authorizing Provider   metoprolol succinate (TOPROL-XL) 100 mg tablet Take 100 mg by mouth daily. Yes Emy Hernandez MD   mirtazapine (REMERON) 15 mg tablet Take  by mouth nightly. Yes Emy Hernandez MD   insulin lispro (HUMALOG U-100 INSULIN) 100 unit/mL injection by SubCUTAneous route four (4) times daily as needed (sliding scale). Yes Emy Hernandez MD   carBAMazepine (TEGRETOL) 200 mg tablet Take 200 mg by mouth two (2) times a day. Yes Historical Provider   chlorthalidone (HYGROTEN) 25 mg tablet Take  by mouth daily. Yes Historical Provider   insulin lispro protamine/insulin lispro (HUMALOG MIX 75-25) 100 unit/mL (75-25) injection 35 Units by SubCUTAneous route two (2) times a day. Yes Historical Provider   lisdexamfetamine (VYVANSE) 20 mg capsule Take 50 mg by mouth daily. Yes Historical Provider   cyclobenzaprine (FLEXERIL) 10 mg tablet Take 10 mg by mouth as needed. 11/30/16  Yes Historical Provider   amLODIPine (NORVASC) 10 mg tablet Take 1 Tab by mouth Daily (before dinner). 5/7/16  Yes Karla Walters MD   atorvastatin (LIPITOR) 40 mg tablet Take 2 Tabs by mouth nightly. 5/7/16  Yes Karla Walters MD   clopidogrel (PLAVIX) 75 mg tablet Take 1 Tab by mouth daily. 5/7/16  Yes Karla Walters MD   tamsulosin (FLOMAX) 0.4 mg capsule Take 0.4 mg by mouth every evening. Yes Historical Provider   traZODone (DESYREL) 50 mg tablet Take 50 mg by mouth as needed. Yes Historical Provider   cloNIDine HCl (CATAPRES) 0.1 mg tablet Take 0.4 mg by mouth two (2) times a day. Historical Provider   valsartan (DIOVAN) 160 mg tablet Take 320 mg by mouth daily. Historical Provider   QUEtiapine (SEROQUEL) 25 mg tablet Take 50 mg by mouth nightly as needed. Historical Provider       REVIEW OF SYSTEMS:     I am not able to complete the review of systems because:    The patient is intubated and sedated    The patient has altered mental status due to his acute medical problems    The patient has baseline aphasia from prior stroke(s)    The patient has baseline dementia and is not reliable historian    The patient is in acute medical distress and unable to provide information           Total of 12 systems reviewed as follows:       POSITIVE= underlined text  Negative = text not underlined  General:  fever, chills, sweats, generalized weakness, weight loss/gain,      loss of appetite   Eyes:    blurred vision, eye pain, loss of vision, double vision  ENT:    rhinorrhea, pharyngitis   Respiratory:   cough, sputum production, SOB, KNIGHT, wheezing, pleuritic pain   Cardiology:   chest pain, palpitations, orthopnea, PND, edema, syncope   Gastrointestinal:  abdominal pain , N/V, diarrhea, dysphagia, constipation, bleeding   Genitourinary:  frequency, urgency, dysuria, hematuria, incontinence   Muskuloskeletal :  arthralgia, myalgia, back pain  Hematology:  easy bruising, nose or gum bleeding, lymphadenopathy   Dermatological: rash, ulceration, pruritis, color change / jaundice  Endocrine:   hot flashes or polydipsia   Neurological:  headache, dizziness, confusion, focal weakness, paresthesia,     Speech difficulties, memory loss, gait difficulty  Psychological: Feelings of anxiety, depression, agitation    Objective:   VITALS:    Visit Vitals    /77 (BP 1 Location: Right arm)    Pulse 61    Temp 97.9 °F (36.6 °C)    Resp 17    Ht 6' (1.829 m)    Wt 101.6 kg (224 lb)    SpO2 98%    BMI 30.38 kg/m2       PHYSICAL EXAM:    General:    Alert, cooperative, no distress, appears stated age, wife at bedside. HEENT: Atraumatic, anicteric sclerae, pink conjunctivae     No oral ulcers, mucosa dry throat clear, dentition fair, nasal canula in place. Neck:  Supple, symmetrical,  No stridor  Lungs:   Clear to auscultation bilaterally. No Wheezing or Rhonchi. No rales. Chest wall:  No tenderness  No Accessory muscle use. Heart:   Regular  rhythm,  No  murmur   trace edema  Abdomen:   Soft, non-tender. Not distended. Bowel sounds normal  Extremities: No cyanosis. No clubbing,      Skin turgor normal, Capillary refill normal, Radial dial pulse 2+  Skin:     Not pale. Not Jaundiced  No rashes   Psych:  Good insight. Not depressed. Not anxious or agitated. Neurologic: No facial asymmetry. No aphasia or slurred speech. Symmetrical strength, Sensation grossly intact. Alert and oriented X 4. _______________________________________________________________________  Care Plan discussed with:    Comments   Patient x    Family  x    RN x    Care Manager                    Consultant:      _______________________________________________________________________  Expected  Disposition:   Home with Family x   HH/PT/OT/RN    SNF/LTC    DANIEL    ________________________________________________________________________  TOTAL TIME:  48 Minutes    Critical Care Provided     Minutes non procedure based      Comments    x Reviewed previous records   >50% of visit spent in counseling and coordination of care x Discussion with patient and/or family and questions answered       ________________________________________________________________________  Signed: Remi Faulkner DO    Procedures: see electronic medical records for all procedures/Xrays and details which were not copied into this note but were reviewed prior to creation of Plan.     LAB DATA REVIEWED:    Recent Results (from the past 24 hour(s))   EKG, 12 LEAD, INITIAL    Collection Time: 06/02/18  6:38 AM   Result Value Ref Range    Ventricular Rate 62 BPM    Atrial Rate 62 BPM    P-R Interval 156 ms    QRS Duration 152 ms    Q-T Interval 456 ms    QTC Calculation (Bezet) 462 ms    Calculated P Axis 28 degrees    Calculated R Axis 169 degrees    Calculated T Axis 7 degrees    Diagnosis       Normal sinus rhythm  Indeterminate axis  Right bundle branch block  Confirmed by Connor Barrera ([id number]) on 6/2/2018 9:33:51 AM     CBC WITH AUTOMATED DIFF    Collection Time: 06/02/18  6:40 AM   Result Value Ref Range    WBC 10.0 4.1 - 11.1 K/uL    RBC 5.01 4.10 - 5.70 M/uL    HGB 14.6 12.1 - 17.0 g/dL    HCT 41.1 36.6 - 50.3 %    MCV 82.0 80.0 - 99.0 FL    MCH 29.1 26.0 - 34.0 PG    MCHC 35.5 30.0 - 36.5 g/dL    RDW 12.9 11.5 - 14.5 %    PLATELET 012 665 - 623 K/uL    MPV 9.6 8.9 - 12.9 FL    NRBC 0.0 0  WBC    ABSOLUTE NRBC 0.00 0.00 - 0.01 K/uL NEUTROPHILS 73 32 - 75 %    LYMPHOCYTES 19 12 - 49 %    MONOCYTES 7 5 - 13 %    EOSINOPHILS 1 0 - 7 %    BASOPHILS 1 0 - 1 %    IMMATURE GRANULOCYTES 0 0.0 - 0.5 %    ABS. NEUTROPHILS 7.3 1.8 - 8.0 K/UL    ABS. LYMPHOCYTES 1.9 0.8 - 3.5 K/UL    ABS. MONOCYTES 0.7 0.0 - 1.0 K/UL    ABS. EOSINOPHILS 0.1 0.0 - 0.4 K/UL    ABS. BASOPHILS 0.1 0.0 - 0.1 K/UL    ABS. IMM. GRANS. 0.0 0.00 - 0.04 K/UL    DF AUTOMATED     METABOLIC PANEL, COMPREHENSIVE    Collection Time: 06/02/18  6:40 AM   Result Value Ref Range    Sodium 137 136 - 145 mmol/L    Potassium 3.6 3.5 - 5.1 mmol/L    Chloride 101 97 - 108 mmol/L    CO2 27 21 - 32 mmol/L    Anion gap 9 5 - 15 mmol/L    Glucose 209 (H) 65 - 100 mg/dL    BUN 21 (H) 6 - 20 MG/DL    Creatinine 1.10 0.70 - 1.30 MG/DL    BUN/Creatinine ratio 19 12 - 20      GFR est AA >60 >60 ml/min/1.73m2    GFR est non-AA >60 >60 ml/min/1.73m2    Calcium 8.8 8.5 - 10.1 MG/DL    Bilirubin, total 0.3 0.2 - 1.0 MG/DL    ALT (SGPT) 20 12 - 78 U/L    AST (SGOT) 16 15 - 37 U/L    Alk. phosphatase 119 (H) 45 - 117 U/L    Protein, total 6.6 6.4 - 8.2 g/dL    Albumin 3.5 3.5 - 5.0 g/dL    Globulin 3.1 2.0 - 4.0 g/dL    A-G Ratio 1.1 1.1 - 2.2     PROTHROMBIN TIME + INR    Collection Time: 06/02/18  6:40 AM   Result Value Ref Range    INR 1.1 0.9 - 1.1      Prothrombin time 11.4 (H) 9.0 - 11.1 sec   CULTURE, BLOOD, PAIRED    Collection Time: 06/02/18  6:40 AM   Result Value Ref Range    Special Requests: NO SPECIAL REQUESTS      Culture result: NO GROWTH AFTER 1 HOUR     CK    Collection Time: 06/02/18  6:40 AM   Result Value Ref Range    CK 84 39 - 308 U/L   CK-MB,QUANT.     Collection Time: 06/02/18  6:40 AM   Result Value Ref Range    CK - MB 2.4 <3.6 NG/ML    CK-MB Index 2.9 (H) 0 - 2.5     NT-PRO BNP    Collection Time: 06/02/18  6:40 AM   Result Value Ref Range    NT pro- (H) 0 - 125 PG/ML   TROPONIN I    Collection Time: 06/02/18  6:40 AM   Result Value Ref Range    Troponin-I, Qt. <0.04 <0.05 ng/mL   LACTIC ACID    Collection Time: 06/02/18  6:45 AM   Result Value Ref Range    Lactic acid 1.9 0.4 - 2.0 MMOL/L   URINALYSIS W/ RFLX MICROSCOPIC    Collection Time: 06/02/18  8:10 AM   Result Value Ref Range    Color DARK YELLOW      Appearance CLEAR CLEAR      Specific gravity 1.027 1.003 - 1.030      pH (UA) 5.5 5.0 - 8.0      Protein 300 (A) NEG mg/dL    Glucose 250 (A) NEG mg/dL    Ketone NEGATIVE  NEG mg/dL    Blood NEGATIVE  NEG      Urobilinogen 1.0 0.2 - 1.0 EU/dL    Nitrites NEGATIVE  NEG      Leukocyte Esterase NEGATIVE  NEG      WBC 0-4 0 - 4 /hpf    RBC 0-5 0 - 5 /hpf    Epithelial cells FEW FEW /lpf    Bacteria 1+ (A) NEG /hpf    Mucus 2+ (A) NEG /lpf    Hyaline cast 5-10 0 - 5 /lpf   BILIRUBIN, CONFIRM    Collection Time: 06/02/18  8:10 AM   Result Value Ref Range    Bilirubin UA, confirm NEGATIVE  NEG

## 2018-06-02 NOTE — PROGRESS NOTES
TRANSFER - IN REPORT:    Verbal report received from Sultana Scott RN(name) on [facility]  being received from ED(unit) for routine progression of care      Report consisted of patients Situation, Background, Assessment and   Recommendations(SBAR). Information from the following report(s) SBAR, Kardex, ED Summary, Intake/Output, MAR and Recent Results was reviewed with the receiving nurse. Opportunity for questions and clarification was provided. Assessment completed upon patients arrival to unit and care assumed.

## 2018-06-02 NOTE — ED NOTES
Patient stated he felt \"very weak\" when standing for orthostatic bp but denies he felt lightheaded or dizzy

## 2018-06-02 NOTE — ED TRIAGE NOTES
Pt to ed via ems with c/o weakness. Per medics, [place] and [place] more weak and made his left side was more weak than normal from his previous stroke.

## 2018-06-02 NOTE — PROGRESS NOTES
Bedside shift change report given to ANGEL LUIS Fairbanks (oncoming nurse) by Zaynab Conte (offgoing nurse). Report included the following information SBAR, Kardex, Intake/Output, MAR and Recent Results.

## 2018-06-02 NOTE — ED NOTES
Bedside shift change report given to [address] [place] [address] (oncoming nurse) by Wilma Adams (offgoing nurse). Report included the following information SBAR, ED Summary, MAR and Recent Results.

## 2018-06-02 NOTE — IP AVS SNAPSHOT
[address] [facility] 
[phone] Patient: Lucina Hu MRN: [id number] :1965 A check juan luis indicates which time of day the medication should be taken. My Medications START taking these medications Instructions Each Dose to Equal  
 Morning Noon Evening Bedtime  
 enoxaparin injection Commonly known as:  LOVENOX Start taking on:  2018 Your last dose was: Your next dose is:    
   
   
 150 mg by SubCUTAneous route daily for 7 days. 150 mg  
    
   
   
   
  
 warfarin 5 mg tablet Commonly known as:  COUMADIN Your last dose was: Your next dose is: Take 1 Tab by mouth nightly for 60 days. 5 mg CHANGE how you take these medications Instructions Each Dose to Equal  
 Morning Noon Evening Bedtime  
 amLODIPine 10 mg tablet Commonly known as:  Arcadio Wolf What changed:  how much to take Your last dose was: Your next dose is: Take 1 Tab by mouth Daily (before dinner). 10 mg CONTINUE taking these medications Instructions Each Dose to Equal  
 Morning Noon Evening Bedtime  
 atorvastatin 40 mg tablet Commonly known as:  LIPITOR Your last dose was: Your next dose is: Take 2 Tabs by mouth nightly. 80 mg  
    
   
   
   
  
 chlorthalidone 25 mg tablet Commonly known as:  Zulma Mooring Your last dose was: Your next dose is: Take  by mouth daily. cloNIDine HCl 0.1 mg tablet Commonly known as:  CATAPRES Your last dose was: Your next dose is: Take 0.4 mg by mouth two (2) times a day. 0.4 mg  
    
   
   
   
  
 clopidogrel 75 mg Tab Commonly known as:  PLAVIX Your last dose was: Your next dose is: Take 1 Tab by mouth daily. 75 mg  
    
   
   
   
  
 cyclobenzaprine 10 mg tablet Commonly known as:  FLEXERIL Your last dose was: Your next dose is: Take 10 mg by mouth as needed. 10 mg  
    
   
   
   
  
 DIOVAN 160 mg tablet Generic drug:  valsartan Your last dose was: Your next dose is: Take 320 mg by mouth daily. 320 mg HumaLOG Mix 75-25(U-100)Insuln 100 unit/mL (75-25) injection Generic drug:  insulin lispro protamine/insulin lispro Your last dose was: Your next dose is:    
   
   
 35 Units by SubCUTAneous route two (2) times a day. 35 Units HumaLOG U-100 Insulin 100 unit/mL injection Generic drug:  insulin lispro Your last dose was: Your next dose is:    
   
   
 by SubCUTAneous route four (4) times daily as needed (sliding scale). metoprolol succinate 100 mg tablet Commonly known as:  TOPROL-XL Your last dose was: Your next dose is: Take 100 mg by mouth daily. 100 mg REMERON 15 mg tablet Generic drug:  mirtazapine Your last dose was: Your next dose is: Take  by mouth nightly. SEROquel 25 mg tablet Generic drug:  QUEtiapine Your last dose was: Your next dose is: Take 50 mg by mouth nightly as needed. 50 mg  
    
   
   
   
  
 tamsulosin 0.4 mg capsule Commonly known as:  FLOMAX Your last dose was: Your next dose is: Take 0.4 mg by mouth every evening. 0.4 mg  
    
   
   
   
  
 TEGretol 200 mg tablet Generic drug:  carBAMazepine Your last dose was: Your next dose is: Take 200 mg by mouth two (2) times a day. 200 mg  
    
   
   
   
  
 traZODone 50 mg tablet Commonly known as:  Elvia Odor Your last dose was: Your next dose is: Take 50 mg by mouth as needed. 50 mg  
    
   
   
   
  
 VYVANSE 20 mg capsule Generic drug:  lisdexamfetamine Your last dose was: Your next dose is: Take 50 mg by mouth daily. 50 mg Where to Get Your Medications Information on where to get these meds will be given to you by the nurse or doctor. ! Ask your nurse or doctor about these medications  
  enoxaparin injection  
 warfarin 5 mg tablet

## 2018-06-02 NOTE — ED PROVIDER NOTES
EMERGENCY DEPARTMENT HISTORY AND PHYSICAL EXAM      Date: 2018  Patient Name: Larry Phillips    History of Presenting Illness     Chief Complaint   Patient presents with    Lethargy       History Provided By: Patient    HPI: Larry Phillips, 52 y.o. male with PMHx significant for CAD, DM (type 2), essential HTN, peripheral neuropathy, CKD, dyslipidemia, BPH , presents in wheelchair to the ED with cc of gradually worsening moderate weakness and slurred speech this morning alongside nausea and sharp non-radiating chest pain to the left side. [place] airport [place] he had an onset of weakness especially to his legs, making it difficult to walk and worsening speech. Additionally pt states that he took his blood sugar which was 269 but that this is at baseline. He notes intermittent moderate HA, diaphoresis of his wrists, neck pain, cough without sputum and SOB ongoing for 1 week secondary to sxs. Pt describes his neck pain as 6 out of 10 and his HA as 4 out of 10. He reports that his sharp non-radiating chest pain, ongoing since yesterday is exacerbated when laying down and denies any other modifying factors at this time. He expresses that his weakness has gradually improved while being in ED. He denies any recent injuries or head trauma. Pt specifically denies any abdominal pain, urinary problems, diarrhea, fever, chills, and nausea. Chief Complaint: weakness and slurred speech  Duration: 1 day   Timing:  Gradual and Worsening  Location: N/A  Quality: N/A  Severity: Moderate  Modifying Factors: N/A  Associated Symptoms: HA, diaphoresis of his wrists, neck pain, cough without sputum, SOB, nausea, and non-radiating chest pain of left side    There are no other complaints, changes, or physical findings at this time.     FHx: DM (parents); MI (brother; )  SHx: (-) Smoker; (-)EtOH use; (-)illicit drug use    PCP: Karla Fraser MD    Current Facility-Administered Medications   Medication Dose Route Frequency Provider Last Rate Last Dose    amLODIPine (NORVASC) tablet 15 mg  15 mg Oral ACD Lulu Rather, DO        atorvastatin (LIPITOR) tablet 80 mg  80 mg Oral QHS Lulu Rather, DO        carBAMazepine (TEGretol) tablet 200 mg  200 mg Oral BID Lulu Rather, DO   200 mg at 06/02/18 1731    [START ON 6/3/2018] chlorthalidone (HYGROTEN) tablet 25 mg  25 mg Oral DAILY Lulu Rather, DO        cloNIDine HCl (CATAPRES) tablet 0.4 mg  0.4 mg Oral BID Lulu Rather, DO   0.4 mg at 06/02/18 1731    [START ON 6/3/2018] clopidogrel (PLAVIX) tablet 75 mg  75 mg Oral DAILY Lulu Rather, DO        cyclobenzaprine (FLEXERIL) tablet 10 mg  10 mg Oral TID PRN Lulu Rather, DO        [START ON 6/3/2018] lisdexamfetamine (VYVANSE) capsule 60 mg  60 mg Oral DAILY Lulu Rather, DO        [START ON 6/3/2018] metoprolol succinate (TOPROL-XL) XL tablet 100 mg  100 mg Oral DAILY Lulu Rather, DO        mirtazapine (REMERON) tablet 15 mg  15 mg Oral QHS Lulu Rather, DO        QUEtiapine (SEROquel) tablet 50 mg  50 mg Oral QHS PRN Lulu Rather, DO        tamsulosin [facility]) capsule 0.4 mg  0.4 mg Oral QPM Lulu Rather, DO   0.4 mg at 06/02/18 1731    traZODone (DESYREL) tablet 50 mg  50 mg Oral QHS PRN Lulu Rather, DO        [START ON 6/3/2018] valsartan (DIOVAN) tablet 320 mg  320 mg Oral DAILY Lulu Rather, DO        sodium chloride (NS) flush 5-10 mL  5-10 mL IntraVENous Q8H Lulu Rather, DO   10 mL at 06/02/18 1733    sodium chloride (NS) flush 5-10 mL  5-10 mL IntraVENous PRN Lulu Rather, DO        acetaminophen (TYLENOL) tablet 500 mg  500 mg Oral Q4H PRN Lulu Rather, DO        HYDROcodone-acetaminophen [facility]) 5-325 mg per tablet 1 Tab  1 Tab Oral Q4H PRN Lulu Rather, DO        naloxone [facility]) injection 0.4 mg  0.4 mg IntraVENous PRN Lulu Rather, DO        ondansetron (ZOFRAN) injection 4 mg  4 mg IntraVENous Q4H PRN Chacha Buttner, DO        bisacodyl (DULCOLAX) tablet 5 mg  5 mg Oral DAILY PRN Chacha Buttner, DO        enoxaparin (LOVENOX) injection 100 mg  1 mg/kg SubCUTAneous Q12H Chacha Buttner, DO        morphine IR (MS IR) tablet 15 mg  15 mg Oral Q4H PRN Chacha Buttner, DO        . PHARMACY TO SUBSTITUTE PER PROTOCOL    Per Protocol Chacha Buttner, DO        insulin glargine (LANTUS) injection 42 Units  42 Units SubCUTAneous QHS Chacha Buttner, DO        And    insulin lispro (HUMALOG) injection 5 Units  5 Units SubCUTAneous TIDAC Chacha Buttner, DO   5 Units at 06/02/18 1730       Past History     Past Medical History:  Past Medical History:   Diagnosis Date    BPH (benign prostatic hyperplasia)     CAD (coronary artery disease)     multivessel    CKD (chronic kidney disease), stage III     DM type 2 causing neurological disease ([address]     DM type 2 [facility]     DM type 2 causing vascular disease (HCC)     Dyslipidemia, goal LDL below 79     Essential hypertension, benign     History of acute inferior wall MI 5/30/2017    Lupus     Neuropathy     Thoracic outlet syndrome        Past Surgical History:  Past Surgical History:   Procedure Laterality Date    HX ORTHOPAEDIC  1996    L. knee arthroscopy       Family History:  Family History   Problem Relation Age of Onset    Heart Disease Mother     Stroke Mother     Diabetes Father     Stroke Father     Heart Disease Father        Social History:  Social History   Substance Use Topics    Smoking status: Never Smoker    Smokeless tobacco: Never Used    Alcohol use Yes       Allergies: Allergies   Allergen Reactions    Iodinated Contrast- Oral And Iv Dye Anaphylaxis         Review of Systems   Review of Systems   Constitutional: Positive for diaphoresis (of neck and wrists). Negative for chills and fever. HENT: Negative for congestion. Eyes: Negative for visual disturbance. Respiratory: Positive for cough (no sputum) and shortness of breath. Negative for chest tightness. Cardiovascular: Positive for chest pain (nonradiating; to left side). Gastrointestinal: Positive for nausea. Negative for abdominal pain, blood in stool, diarrhea and vomiting. Endocrine: Negative for heat intolerance. Musculoskeletal: Positive for neck pain. Skin: Negative for rash. Allergic/Immunologic: Negative for immunocompromised state. Neurological: Positive for speech difficulty, weakness and headaches. Hematological: Does not bruise/bleed easily. Psychiatric/Behavioral: Negative. All other systems reviewed and are negative. Physical Exam   Physical Exam   Constitutional: He is oriented to person, place, and time. He appears distressed (mild). HENT:   Head: Atraumatic. Eyes: EOM are normal.   Neck:   Non tender neck   Cardiovascular: Normal rate, regular rhythm, normal heart sounds and intact distal pulses. Exam reveals no gallop and no friction rub. No murmur heard. Pulmonary/Chest: Effort normal and breath sounds normal. No respiratory distress. He has no wheezes. He has no rales. He exhibits no tenderness. Abdominal: Soft. Bowel sounds are normal. He exhibits no distension and no mass. There is no tenderness. There is no rebound and no guarding. Abdomen soft   Musculoskeletal: Normal range of motion. He exhibits no edema or tenderness. +4/5 left leg motor strength (at baseline)   Neurological: He is alert and oriented to person, place, and time. Slight speech slur with dysarthria  Facial droop of left side  Decreased sensation on left side (at baseline)  Decreased  on left side (at baseline)     Skin: There is pallor. Psychiatric: He has a normal mood and affect. His behavior is normal.   Nursing note and vitals reviewed.       Diagnostic Study Results     Labs -     Recent Results (from the past 12 hour(s))   EKG, 12 LEAD, INITIAL    Collection Time: 06/02/18  6:38 AM   Result Value Ref Range    Ventricular Rate 62 BPM    Atrial Rate 62 BPM    P-R Interval 156 ms    QRS Duration 152 ms    Q-T Interval 456 ms    QTC Calculation (Bezet) 462 ms    Calculated P Axis 28 degrees    Calculated R Axis 169 degrees    Calculated T Axis 7 degrees    Diagnosis       Normal sinus rhythm  Indeterminate axis  Right bundle branch block  Confirmed by Carolyn Grajeda ([id number]) on 6/2/2018 9:33:51 AM     CBC WITH AUTOMATED DIFF    Collection Time: 06/02/18  6:40 AM   Result Value Ref Range    WBC 10.0 4.1 - 11.1 K/uL    RBC 5.01 4.10 - 5.70 M/uL    HGB 14.6 12.1 - 17.0 g/dL    HCT 41.1 36.6 - 50.3 %    MCV 82.0 80.0 - 99.0 FL    MCH 29.1 26.0 - 34.0 PG    MCHC 35.5 30.0 - 36.5 g/dL    RDW 12.9 11.5 - 14.5 %    PLATELET 406 201 - 472 K/uL    MPV 9.6 8.9 - 12.9 FL    NRBC 0.0 0  WBC    ABSOLUTE NRBC 0.00 0.00 - 0.01 K/uL    NEUTROPHILS 73 32 - 75 %    LYMPHOCYTES 19 12 - 49 %    MONOCYTES 7 5 - 13 %    EOSINOPHILS 1 0 - 7 %    BASOPHILS 1 0 - 1 %    IMMATURE GRANULOCYTES 0 0.0 - 0.5 %    ABS. NEUTROPHILS 7.3 1.8 - 8.0 K/UL    ABS. LYMPHOCYTES 1.9 0.8 - 3.5 K/UL    ABS. MONOCYTES 0.7 0.0 - 1.0 K/UL    ABS. EOSINOPHILS 0.1 0.0 - 0.4 K/UL    ABS. BASOPHILS 0.1 0.0 - 0.1 K/UL    ABS. IMM. GRANS. 0.0 0.00 - 0.04 K/UL    DF AUTOMATED     METABOLIC PANEL, COMPREHENSIVE    Collection Time: 06/02/18  6:40 AM   Result Value Ref Range    Sodium 137 136 - 145 mmol/L    Potassium 3.6 3.5 - 5.1 mmol/L    Chloride 101 97 - 108 mmol/L    CO2 27 21 - 32 mmol/L    Anion gap 9 5 - 15 mmol/L    Glucose 209 (H) 65 - 100 mg/dL    BUN 21 (H) 6 - 20 MG/DL    Creatinine 1.10 0.70 - 1.30 MG/DL    BUN/Creatinine ratio 19 12 - 20      GFR est AA >60 >60 ml/min/1.73m2    GFR est non-AA >60 >60 ml/min/1.73m2    Calcium 8.8 8.5 - 10.1 MG/DL    Bilirubin, total 0.3 0.2 - 1.0 MG/DL    ALT (SGPT) 20 12 - 78 U/L    AST (SGOT) 16 15 - 37 U/L    Alk.  phosphatase 119 (H) 45 - 117 U/L    Protein, total 6.6 6.4 - 8.2 g/dL    Albumin 3.5 3.5 - 5.0 g/dL    Globulin 3.1 2.0 - 4.0 g/dL    A-G Ratio 1.1 1.1 - 2.2     PROTHROMBIN TIME + INR    Collection Time: 06/02/18  6:40 AM   Result Value Ref Range    INR 1.1 0.9 - 1.1      Prothrombin time 11.4 (H) 9.0 - 11.1 sec   CULTURE, BLOOD, PAIRED    Collection Time: 06/02/18  6:40 AM   Result Value Ref Range    Special Requests: NO SPECIAL REQUESTS      Culture result: NO GROWTH AFTER 1 HOUR     CK    Collection Time: 06/02/18  6:40 AM   Result Value Ref Range    CK 84 39 - 308 U/L   CK-MB,QUANT.     Collection Time: 06/02/18  6:40 AM   Result Value Ref Range    CK - MB 2.4 <3.6 NG/ML    CK-MB Index 2.9 (H) 0 - 2.5     NT-PRO BNP    Collection Time: 06/02/18  6:40 AM   Result Value Ref Range    NT pro- (H) 0 - 125 PG/ML   TROPONIN I    Collection Time: 06/02/18  6:40 AM   Result Value Ref Range    Troponin-I, Qt. <0.04 <0.05 ng/mL   LACTIC ACID    Collection Time: 06/02/18  6:45 AM   Result Value Ref Range    Lactic acid 1.9 0.4 - 2.0 MMOL/L   URINALYSIS W/ RFLX MICROSCOPIC    Collection Time: 06/02/18  8:10 AM   Result Value Ref Range    Color DARK YELLOW      Appearance CLEAR CLEAR      Specific gravity 1.027 1.003 - 1.030      pH (UA) 5.5 5.0 - 8.0      Protein 300 (A) NEG mg/dL    Glucose 250 (A) NEG mg/dL    Ketone NEGATIVE  NEG mg/dL    Blood NEGATIVE  NEG      Urobilinogen 1.0 0.2 - 1.0 EU/dL    Nitrites NEGATIVE  NEG      Leukocyte Esterase NEGATIVE  NEG      WBC 0-4 0 - 4 /hpf    RBC 0-5 0 - 5 /hpf    Epithelial cells FEW FEW /lpf    Bacteria 1+ (A) NEG /hpf    Mucus 2+ (A) NEG /lpf    Hyaline cast 5-10 0 - 5 /lpf   BILIRUBIN, CONFIRM    Collection Time: 06/02/18  8:10 AM   Result Value Ref Range    Bilirubin UA, confirm NEGATIVE  NEG     GLUCOSE, POC    Collection Time: 06/02/18  4:55 PM   Result Value Ref Range    Glucose (POC) 273 (H) 65 - 100 mg/dL    Performed by Sabino Curtis (PCT)        Radiologic Studies -   NM LUNG VENT/PERF Final Result   COMPARISON:  Chest Radiograph on 6/2/2018     Technique: Ventilation images were obtained in multiple projections following  the inhalation of 10 mCi xenon-133 gas. Perfusion images were obtained in  multiple projections following the uneventful intravenous administration of 4  mCi technetium 99m MAA.     FINDINGS:  There is a mismatched defect on perfusion imaging involving the right upper  lobe, with normal associated ventilation. There is no radiographic abnormality  involving the right upper lobe. There is also a mismatch involving the superior  segment of the right lower lobe. There is normal perfusion and ventilation to  the left lung.     IMPRESSION  IMPRESSION:      1. Large mismatched defect involving the right upper lobe as well as the  superior segment of the right lower lobe, with normal associated ventilation. 2. This a high probability study for pulmonary embolism. CT Results  (Last 48 hours)               06/02/18 0710  CT HEAD WO CONT Final result    Impression:  IMPRESSION:       No acute intracranial abnormality       Narrative:  EXAM:  CT HEAD WO CONT       INDICATION:   Headache; weakness       COMPARISON: June 23, 2017. CONTRAST:  None. TECHNIQUE: Unenhanced CT of the head was performed using 5 mm images. Brain and   bone windows were generated. CT dose reduction was achieved through use of a   standardized protocol tailored for this examination and automatic exposure   control for dose modulation. FINDINGS:   The ventricles and sulci are normal in size, shape and configuration and   midline. There is no significant white matter disease. There is no intracranial   hemorrhage, extra-axial collection, mass, mass effect or midline shift. The   basilar cisterns are open. No acute infarct is identified. The bone windows   demonstrate no abnormalities. The visualized portions of the paranasal sinuses   and mastoid air cells are clear. CXR Results  (Last 48 hours)               06/02/18 0722  XR CHEST PORT Final result    Impression:  IMPRESSION:   1. Indeterminate oval density in the right mid chest. This is in a similar   position to airspace disease in the previous study 2013. Recommend follow-up in   2-3 weeks to ensure resolution. Narrative:  INDICATION:  Chest Pain/hypoxia        EXAM: Portable chest 0721 hours. Comparison September 1, 2013       FINDINGS: Cardiac silhouette is unchanged upper limits of normal. Pulmonary   vasculature is not engorged. Oval density in the right mid chest is again noted,   not significantly changed. There is no pneumothorax or effusion. Medical Decision Making   I am the first provider for this patient. I reviewed the vital signs, available nursing notes, past medical history, past surgical history, family history and social history. Vital Signs-Reviewed the patient's vital signs. Patient Vitals for the past 12 hrs:   Temp Pulse Resp BP SpO2   06/02/18 1525 98.4 °F (36.9 °C) 63 16 136/64 100 %   06/02/18 1432 97.7 °F (36.5 °C) 65 16 130/71 100 %   06/02/18 1205 97.6 °F (36.4 °C) 60 12 (!) 137/91 99 %   06/02/18 1156 - 60 11 - 99 %   06/02/18 1035 - - - (!) 134/91 98 %   06/02/18 0817 - - - - 98 %   06/02/18 0659 - - - - 92 %   06/02/18 0653 - - - - (!) 89 %   06/02/18 0637 97.9 °F (36.6 °C) 61 17 102/77 91 %     Pulse Oximetry Analysis - 92% on NC    Cardiac Monitor:   Rate: 62 bpm  Rhythm: Normal Sinus Rhythm     EKG interpretation: (Preliminary) 7:07 AM  Rhythm: normal sinus rhythm; and regular . Rate (approx.): 62 bpm; Axis: normal; PA interval: normal; QRS interval: normal ; ST/T wave: normal; Other findings: unchanged from previous ekg, right BBB, inferior infarct (age undetermined).   Written by TANJA Naylor, as dictated by Suzanne Jones MD.    Records Reviewed: Nursing Notes, Old Medical Records and Previous Laboratory Studies    Provider Notes (Medical Decision Making):   DDx: Sepsis, bronchitis, CHF, pneumonia, CAD, TIA, UTI, electrolyte abnormality, anemia, arrhythmia, tension HA    ED Course:   Initial assessment performed. The patients presenting problems have been discussed, and they are in agreement with the care plan formulated and outlined with them. I have encouraged them to ask questions as they arise throughout their visit. 9:39AM  Pt has been re-evaluated, he is at high probability for PE. Plan to admit to hospitalist.    CONSULT NOTE:   9:49 AM  Linda Matthews MD spoke with Juan Ricardo. Cassie Ordoñez MD.  Specialty: Hospitalist  Discussed pt's hx, disposition, and available diagnostic and imaging results. Reviewed care plans. Consultant will evaluate pt for admission. Written by Clarene Gaucher, ED Scribe, as dictated by Linda Matthews MD.  CRITICAL CARE NOTE :    10:10 PM      IMPENDING DETERIORATION -Respiratory, Cardiovascular, CNS and Metabolic    ASSOCIATED RISK FACTORS - Hypotension, Hypoxia, Dysrhythmia, Metabolic changes and Dehydration    MANAGEMENT- Bedside Assessment and Supervision of Care    INTERPRETATION -  Xrays, CT Scan, ECG and Blood Pressure    INTERVENTIONS - hemodynamic mngmt and Metobolic interventions    CASE REVIEW - Hospitalist, Nursing and Family    TREATMENT RESPONSE -Unchanged     PERFORMED BY - Self        NOTES   :      I have spent 65 minutes of critical care time involved in lab review, consultations with specialist, family decision- making, bedside attention and documentation. During this entire length of time I was immediately available to the patient . Linda Matthews MD                                                Critical Care Time: 72    Disposition:  Admit Note:  9:49 AM  Pt is being admitted by Dr. Cassie Ordoñez. The results of their tests and reason(s) for their admission have been discussed with pt and/or available family.  They convey agreement and understanding for the need to be admitted and for admission diagnosis. PLAN:  1. Admit to Dr. Parul Hood  Diagnosis     Clinical Impression:   1. Other acute pulmonary embolism without acute cor pulmonale ([address]    2. Hypoxia    3. Orthostatic hypotension    4. Diabetes mellitus type 2 in nonobese (HCC)    5. Generalized weakness        Attestations: This note is prepared by Steve Ruvalcaba, acting as a Scribe for Refugio Echols MD.    Refugio Echols MD: The scribe's documentation has been prepared under my direction and personally reviewed by me in its entirety. I confirm that the notes above accurately reflects all work, treatment, procedures, and medical decision making performed by me.

## 2018-06-03 LAB
ANION GAP SERPL CALC-SCNC: 7 MMOL/L (ref 5–15)
BASOPHILS # BLD: 0 K/UL (ref 0–0.1)
BASOPHILS NFR BLD: 1 % (ref 0–1)
BUN SERPL-MCNC: 18 MG/DL (ref 6–20)
BUN/CREAT SERPL: 20 (ref 12–20)
CALCIUM SERPL-MCNC: 8.7 MG/DL (ref 8.5–10.1)
CHLORIDE SERPL-SCNC: 107 MMOL/L (ref 97–108)
CO2 SERPL-SCNC: 26 MMOL/L (ref 21–32)
CREAT SERPL-MCNC: 0.91 MG/DL (ref 0.7–1.3)
DIFFERENTIAL METHOD BLD: NORMAL
EOSINOPHIL # BLD: 0.1 K/UL (ref 0–0.4)
EOSINOPHIL NFR BLD: 2 % (ref 0–7)
ERYTHROCYTE [DISTWIDTH] IN BLOOD BY AUTOMATED COUNT: 13 % (ref 11.5–14.5)
GLUCOSE BLD STRIP.AUTO-MCNC: 157 MG/DL (ref 65–100)
GLUCOSE BLD STRIP.AUTO-MCNC: 220 MG/DL (ref 65–100)
GLUCOSE BLD STRIP.AUTO-MCNC: 274 MG/DL (ref 65–100)
GLUCOSE BLD STRIP.AUTO-MCNC: 277 MG/DL (ref 65–100)
GLUCOSE SERPL-MCNC: 235 MG/DL (ref 65–100)
HCT VFR BLD AUTO: 41 % (ref 36.6–50.3)
HGB BLD-MCNC: 14.1 G/DL (ref 12.1–17)
IMM GRANULOCYTES # BLD: 0 K/UL (ref 0–0.04)
IMM GRANULOCYTES NFR BLD AUTO: 0 % (ref 0–0.5)
LYMPHOCYTES # BLD: 1.4 K/UL (ref 0.8–3.5)
LYMPHOCYTES NFR BLD: 22 % (ref 12–49)
MCH RBC QN AUTO: 28.7 PG (ref 26–34)
MCHC RBC AUTO-ENTMCNC: 34.4 G/DL (ref 30–36.5)
MCV RBC AUTO: 83.3 FL (ref 80–99)
MONOCYTES # BLD: 0.4 K/UL (ref 0–1)
MONOCYTES NFR BLD: 7 % (ref 5–13)
NEUTS SEG # BLD: 4.3 K/UL (ref 1.8–8)
NEUTS SEG NFR BLD: 69 % (ref 32–75)
NRBC # BLD: 0 K/UL (ref 0–0.01)
NRBC BLD-RTO: 0 PER 100 WBC
PLATELET # BLD AUTO: 150 K/UL (ref 150–400)
PMV BLD AUTO: 9.8 FL (ref 8.9–12.9)
POTASSIUM SERPL-SCNC: 3.8 MMOL/L (ref 3.5–5.1)
RBC # BLD AUTO: 4.92 M/UL (ref 4.1–5.7)
SERVICE CMNT-IMP: ABNORMAL
SODIUM SERPL-SCNC: 140 MMOL/L (ref 136–145)
WBC # BLD AUTO: 6.3 K/UL (ref 4.1–11.1)

## 2018-06-03 PROCEDURE — 74011250636 HC RX REV CODE- 250/636: Performed by: INTERNAL MEDICINE

## 2018-06-03 PROCEDURE — 80048 BASIC METABOLIC PNL TOTAL CA: CPT | Performed by: INTERNAL MEDICINE

## 2018-06-03 PROCEDURE — 82962 GLUCOSE BLOOD TEST: CPT

## 2018-06-03 PROCEDURE — 74011636637 HC RX REV CODE- 636/637: Performed by: INTERNAL MEDICINE

## 2018-06-03 PROCEDURE — 99218 HC RM OBSERVATION: CPT

## 2018-06-03 PROCEDURE — 36415 COLL VENOUS BLD VENIPUNCTURE: CPT | Performed by: INTERNAL MEDICINE

## 2018-06-03 PROCEDURE — 65660000000 HC RM CCU STEPDOWN

## 2018-06-03 PROCEDURE — 74011250637 HC RX REV CODE- 250/637: Performed by: INTERNAL MEDICINE

## 2018-06-03 PROCEDURE — 96372 THER/PROPH/DIAG INJ SC/IM: CPT

## 2018-06-03 PROCEDURE — 85025 COMPLETE CBC W/AUTO DIFF WBC: CPT | Performed by: INTERNAL MEDICINE

## 2018-06-03 RX ORDER — ENOXAPARIN SODIUM 100 MG/ML
1 INJECTION SUBCUTANEOUS EVERY 12 HOURS
Status: DISCONTINUED | OUTPATIENT
Start: 2018-06-03 | End: 2018-06-04

## 2018-06-03 RX ADMIN — CARBAMAZEPINE 200 MG: 200 TABLET ORAL at 08:36

## 2018-06-03 RX ADMIN — Medication 10 ML: at 22:43

## 2018-06-03 RX ADMIN — CLONIDINE HYDROCHLORIDE 0.4 MG: 0.1 TABLET ORAL at 17:02

## 2018-06-03 RX ADMIN — CHLORTHALIDONE 25 MG: 25 TABLET ORAL at 08:44

## 2018-06-03 RX ADMIN — CARBAMAZEPINE 200 MG: 200 TABLET ORAL at 17:02

## 2018-06-03 RX ADMIN — INSULIN LISPRO 5 UNITS: 100 INJECTION, SOLUTION INTRAVENOUS; SUBCUTANEOUS at 11:40

## 2018-06-03 RX ADMIN — Medication 10 ML: at 06:00

## 2018-06-03 RX ADMIN — ATORVASTATIN CALCIUM 80 MG: 40 TABLET, FILM COATED ORAL at 22:38

## 2018-06-03 RX ADMIN — INSULIN GLARGINE 42 UNITS: 100 INJECTION, SOLUTION SUBCUTANEOUS at 22:38

## 2018-06-03 RX ADMIN — INSULIN LISPRO 5 UNITS: 100 INJECTION, SOLUTION INTRAVENOUS; SUBCUTANEOUS at 07:30

## 2018-06-03 RX ADMIN — METOPROLOL SUCCINATE 100 MG: 50 TABLET, EXTENDED RELEASE ORAL at 08:38

## 2018-06-03 RX ADMIN — VALSARTAN 320 MG: 160 TABLET ORAL at 08:39

## 2018-06-03 RX ADMIN — Medication 10 ML: at 11:33

## 2018-06-03 RX ADMIN — AMLODIPINE BESYLATE 15 MG: 5 TABLET ORAL at 17:00

## 2018-06-03 RX ADMIN — CLONIDINE HYDROCHLORIDE 0.4 MG: 0.1 TABLET ORAL at 08:37

## 2018-06-03 RX ADMIN — INSULIN LISPRO 5 UNITS: 100 INJECTION, SOLUTION INTRAVENOUS; SUBCUTANEOUS at 17:03

## 2018-06-03 RX ADMIN — TRAZODONE HYDROCHLORIDE 50 MG: 50 TABLET ORAL at 22:38

## 2018-06-03 RX ADMIN — MIRTAZAPINE 15 MG: 15 TABLET, FILM COATED ORAL at 22:38

## 2018-06-03 RX ADMIN — ENOXAPARIN SODIUM 100 MG: 100 INJECTION SUBCUTANEOUS at 22:38

## 2018-06-03 RX ADMIN — ENOXAPARIN SODIUM 100 MG: 100 INJECTION SUBCUTANEOUS at 11:32

## 2018-06-03 RX ADMIN — TAMSULOSIN HYDROCHLORIDE 0.4 MG: 0.4 CAPSULE ORAL at 17:04

## 2018-06-03 RX ADMIN — CLOPIDOGREL BISULFATE 75 MG: 75 TABLET ORAL at 08:38

## 2018-06-03 NOTE — CONSULTS
[facility] Pul[facility] Consult Service Note  Pulmonary, Critical Care, and Sleep Medicine    Name: Aditya Adam MRN: [id number]   : 1965 Hospital: [facility]   Date: 6/3/2018   Hospital Day: 2       Subjective/Interval History:   I have reviewed the notes from other providers and old records readily available and summarized findings below with the flowsheet. Seen earlier today on rounds. 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 PE- high prob V/W scan. Hemodynamically stable on room air with no evidence of RV strain. No obvious inciting cause of PE. No clinical evidence to suggest DVT  2. Acute hypoxic respiratory failure- weaned off supplemental O2  3. Chronic RML calcified mass and adenopathy-s/p Bx . C/w chronic granulomatous insult  4. CVA- s/p TPA . mild left sided weakness  5. CAD- s/p MI and PCI   6. CKD          RECOMMENDATIONS/PLAN:   1. Therapeutic Lovenox  2. Agree with transition to Eliquis  3. Would tentatively plan to treat for 6 months at which time we can complete a hypercoagulable work-up  4. Supplemental O2 if needed  5. At this point no indication to obtain CT chest to further evaluate RML mass. CXR stable compared to 2013. Biopsy in  ruled out malignancy. C/w granulomatous process  6. TTE pending  7. Order LE dopplers to rule out DVT  8.  Encouraged regular age appropriate cancer screening    Will ebenefit from outpt pulmonary appointment in 2-4 weeks post discharge          My assessment/management discussed with: Consultants, Nursing, Pharmacy, Case Management, PT, OT, Respiratory Therapy, Hospitalist and Family for coordination of care        Subjective/Initial History:     I was asked by Flaquito Horn DO to see Regine Alexander  a 52 y.o.  male in consultation for a chief complaint of PE    53 yo M with h/o CVA (s/p TPA 2016), DM, CKD, CAD (s/p PCI 2013) and prior granulomatous lung disease with chronic RML calcified mass presents with acute SOB and weakness suspicious for PE. The patient was in his usual state of health until yesterday when he was preapring to fly to [place]. While in the airport he experienced acute onset SOB and difficulty walking through the airport. Just prior to boarding he felt so weak he could not get on the plane. EMS was called and he was brought to the hospital. He denies recent fevers, chills, cough, sputum, URI-like symptoms. He also has no LE edema or swelling. He traveled to [place] in April but has had no pulmonary issues since. Of note he was followed by Dr Vesta Gutierrez in our group back in 2013 for what was determined to be a calcified RML mass. He underwent CT guided biopsy which revealed no malignant cells. He grew up in [place] and spent a lot of time outdoors anf surrounded by chickens which may have resulted in the granulomatous disease. Otherwise he has no known pulmonary disease. Upon arrival a V/Q scan was completed which was read as high probability for PE for which pulm was consulted. He was started on therapeutic Lovenox and remains on RA with no hypoxia. He currently feels much better and denies SOB and CP. He denies family h/o clots. He had a colonoscopy 5 years ago which was unremarkable.        Allergies   Allergen Reactions    Iodinated Contrast- Oral And Iv Dye Anaphylaxis        MAR reviewed and pertinent medications noted or modified as needed     Current Facility-Administered Medications   Medication    apixaban (ELIQUIS) tablet 10 mg    [START ON 6/10/2018] apixaban (ELIQUIS) tablet 5 mg    amLODIPine (NORVASC) tablet 15 mg    atorvastatin (LIPITOR) tablet 80 mg    carBAMazepine (TEGretol) tablet 200 mg    chlorthalidone (HYGROTEN) tablet 25 mg    cloNIDine HCl (CATAPRES) tablet 0.4 mg    clopidogrel (PLAVIX) tablet 75 mg    cyclobenzaprine (FLEXERIL) tablet 10 mg    lisdexamfetamine (VYVANSE) capsule 60 mg    metoprolol succinate (TOPROL-XL) XL tablet 100 mg    mirtazapine (REMERON) tablet 15 mg    QUEtiapine (SEROquel) tablet 50 mg    tamsulosin (FLOMAX) capsule 0.4 mg    traZODone (DESYREL) tablet 50 mg    valsartan (DIOVAN) tablet 320 mg    sodium chloride (NS) flush 5-10 mL    sodium chloride (NS) flush 5-10 mL    acetaminophen (TYLENOL) tablet 500 mg    HYDROcodone-acetaminophen (NORCO) 5-325 mg per tablet 1 Tab    naloxone (NARCAN) injection 0.4 mg    ondansetron (ZOFRAN) injection 4 mg    bisacodyl (DULCOLAX) tablet 5 mg    morphine IR (MS IR) tablet 15 mg    . PHARMACY TO SUBSTITUTE PER PROTOCOL    insulin glargine (LANTUS) injection 42 Units    And    insulin lispro (HUMALOG) injection 5 Units      PMH:  has a past medical history of BPH (benign prostatic hyperplasia); CAD (coronary artery disease); CKD (chronic kidney disease), stage III; DM type 2 causing neurological disease ([address] DM type 2 causing renal disease ([facility] [address] DM type 2 causing vascular disease ([facility] [address] Dyslipidemia, goal LDL below 70; Essential hypertension, benign; History of acute inferior wall MI (5/30/2017); Lupus; Neuropathy; and Thoracic outlet syndrome. PSH:   has a past surgical history that includes hx orthopaedic (1996). FHX: family history includes Diabetes in his father; Heart Disease in his father and mother; Stroke in his father and mother. SHX:  reports that he has never smoked.  He has never used smokeless tobacco. He reports that he drinks alcohol. He reports that he does not use illicit drugs. ROS:A comprehensive review of systems was negative except for that written in the HPI. Objective:     Vital Signs: Telemetry:    normal sinus rhythm Intake/Output:   Visit Vitals    BP (!) 152/93 (BP 1 Location: Right arm, BP Patient Position: At rest)    Pulse 67    Temp 98.3 °F (36.8 °C)    Resp 18    Ht 6' (1.829 m)    Wt 101.6 kg (224 lb)    SpO2 96%    BMI 30.38 kg/m2       Temp (24hrs), Av.3 °F (36.8 °C), Min:97.9 °F (36.6 °C), Max:98.5 °F (36.9 °C)        O2 Device: Room air O2 Flow Rate (L/min): 2 l/min       Wt Readings from Last 4 Encounters:   18 101.6 kg (224 lb)   18 101.5 kg (223 lb 12.8 oz)   17 107 kg (235 lb 14.3 oz)   17 108 kg (238 lb)          Intake/Output Summary (Last 24 hours) at 18 1605  Last data filed at 18 0846   Gross per 24 hour   Intake              300 ml   Output              400 ml   Net             -100 ml       Last shift:      701 - 1900  In: 300 [P.O.:300]  Out: -   Last 3 shifts:  1901 -  0700  In: -   Out: 400 [Urine:400]       Physical Exam:    General:   male; in no apparent distress;   HEAD: Normocephalic, without obvious abnormality, atraumatic   EYES: conjunctivae clear. PERRL,  AN Icteric sclerae   NOSE: nares normal, no drainage, no nasal flaring,    THROAT: normal; Lips, mucosa dry;   Neck: Supple, symmetrical, trachea midline,  No accessory mm use; Chest: normal   Lungs: normal air entry   Heart: Regular rate and rhythm;NO edema   Abdomen: soft, non-tender, without masses or organomegaly   : deferred;    Neuro:AAO3, mild left sided weakness   Psych: oriented to time, place and person;     Skin: Skin unremarkable;      Data:     All lab results for the last 24 hours reviewed.              Labs:    Recent Labs      18   0237  18   0640   WBC  6.3  10.0   HGB  14.1  14.6 PLT  150  196   INR   --   1.1     Recent Labs      06/03/18   0237  06/02/18   0645  06/02/18   0640   NA  140   --   137   K  3.8   --   3.6   CL  107   --   101   CO2  26   --   27   GLU  235*   --   209*   BUN  18   --   21*   CREA  0.91   --   1.10   CA  8.7   --   8.8   LAC   --   1.9   --    ALB   --    --   3.5   SGOT   --    --   16   ALT   --    --   20     No results for input(s): PH, PCO2, PO2, HCO3, FIO2 in the last 72 hours. Recent Labs      06/02/18   0640   CPK  84   CKNDX  2.9*   TROIQ  <0.04     No results found for: BNPP, BNP   Lab Results   Component Value Date/Time    Culture result: NO GROWTH 1 DAY 06/02/2018 06:40 AM    Culture result: NO GROWTH 2 DAYS 05/28/2016 04:15 PM    Culture result: LIGHT  STAPHYLOCOCCUS AUREUS   01/27/2016 08:06 AM   No results found for: TSH, TSHEXT    Imaging:          Results from Hospital Encounter encounter on 06/02/18   XR CHEST PORT   Narrative INDICATION:  Chest Pain/hypoxia     EXAM: Portable chest 0721 hours. Comparison September 1, 2013    FINDINGS: Cardiac silhouette is unchanged upper limits of normal. Pulmonary  vasculature is not engorged. Oval density in the right mid chest is again noted,  not significantly changed. There is no pneumothorax or effusion. Impression IMPRESSION:  1. Indeterminate oval density in the right mid chest. This is in a similar  position to airspace disease in the previous study 2013. Recommend follow-up in  2-3 weeks to ensure resolution. Results from [place] encounter on 06/02/18   CT HEAD WO CONT   Narrative EXAM:  CT HEAD WO CONT    INDICATION:   Headache; weakness    COMPARISON: June 23, 2017. CONTRAST:  None. TECHNIQUE: Unenhanced CT of the head was performed using 5 mm images. Brain and  bone windows were generated. CT dose reduction was achieved through use of a  standardized protocol tailored for this examination and automatic exposure  control for dose modulation. FINDINGS:  The ventricles and sulci are normal in size, shape and configuration and  midline. There is no significant white matter disease. There is no intracranial  hemorrhage, extra-axial collection, mass, mass effect or midline shift. The  basilar cisterns are open. No acute infarct is identified. The bone windows  demonstrate no abnormalities. The visualized portions of the paranasal sinuses  and mastoid air cells are clear. Impression IMPRESSION:    No acute intracranial abnormality          I have personally and independently reviewed the patients interval and diagnostic data, radiographs and have reviewed the reports. I have ordered additional labs to follow the current medical conditions and to monitor treatment responses over the next 24 hours or sooner if needed. If the pt needs,  additional imaging will be obtained to follow longitudinal changes found on the most current imaging. Thank you for allowing us to participate in the care of this patient. We will be happy to follow with you.     Medical Decision Making Today: I have personally:  · Requiring [place] controlled substances - Adjusted / [place] / Started  · Drug therapy requiring intensive monitoring for toxicity: eg systemic steroids, etc  · Review and order of Clinical lab tests  · Review and Order of Radiology tests  · Review and Order of Medicine tests  · Independent visualization of Image  · Review and summarize records or history from previous days notes   · Reviewed Ventilator / NiPPV  · I have personally reviewed the patients ECG / Telemetry  · Abrupt change in neurologic status  · Diagnostic endoscopies with identified risk factors    Cirilo Man DO

## 2018-06-03 NOTE — PROGRESS NOTES
Hospitalist Progress Note    NAME: Cloteal Carrel   :  1965   MRN:  [id number]     Interim Hospital Summary: 52 y.o. male whom presented on 2018 with      Assessment / Plan:    Acute pulmonary embolism  -VQ scan  high probability study for pulmonary embolism with large mismatched defect involving the right upper lobe as well as the superior segment of the right lower lobe, with normal associated ventilation.  -no family history but endorses sedentary lifestyle and recent trip   -check Echo. Cont telemetry  -stable overnight on lovenox. After some discussion, he has agreed to try transition to eliquis tonight. Will as CM to check for coverage. Aim for 6-12 months. Chronic right chest density  Hx Aspergillosis  -he reports having had a negative biopsy in the past    DM  -continue lantus and SSI prn    HTN / CAD, Hx stent  -continue plavix, metoprolol, ARB, clonidine and statin    Hx CVA   Hx Anxiety / depression    30.0 - 39.9 Obese  Body mass index is 30.38 kg/(m^2). Code status: Full  Prophylaxis: Lovenox  Recommended Disposition: Home w/Family       Subjective:     Chief Complaint / Reason for Physician Visit  Follow up of  PE. HTN CAD, DM  Chart reviewed in detail. Discussed with RN events overnight. Review of Systems:  Symptom Y/N Comments  Symptom Y/N Comments   Fever/Chills    Chest Pain y    Poor Appetite    Edema     Cough    Abdominal Pain     Sputum    Joint Pain     SOB/KNIGHT y   Pruritis/Rash     Nausea/vomit    Tolerating PT/OT     Diarrhea    Tolerating Diet     Constipation    Other       Could NOT obtain due to:      PO intake: Patient Vitals for the past 72 hrs:   % Diet Eaten   18 0846 100 %     Objective:     VITALS:   Last 24hrs VS reviewed since prior progress note.  Most recent are:  Patient Vitals for the past 24 hrs:   Temp Pulse Resp BP SpO2   18 1135 98.5 °F (36.9 °C) 67 - (!) 168/92 96 % 06/03/18 0746 97.9 °F (36.6 °C) 68 18 153/90 94 %   06/03/18 0313 98 °F (36.7 °C) 65 18 152/79 94 %   06/02/18 2300 - - - 151/73 -   06/02/18 2242 98.4 °F (36.9 °C) 64 16 162/71 99 %   06/02/18 2149 - - - 157/74 -   06/02/18 1913 98.5 °F (36.9 °C) 69 18 (!) 178/96 99 %   06/02/18 1525 98.4 °F (36.9 °C) 63 16 136/64 100 %   06/02/18 1432 97.7 °F (36.5 °C) 65 16 130/71 100 %       Intake/Output Summary (Last 24 hours) at 06/03/18 1317  Last data filed at 06/03/18 0846   Gross per 24 hour   Intake              300 ml   Output              400 ml   Net             -100 ml        PHYSICAL EXAM:  General: WD, WN. Alert, cooperative, no acute distress    EENT:  EOMI. Anicteric sclerae. MMM  Resp:  CTA bilaterally, no wheezing or rales. No accessory muscle use  CV:  Regular  rhythm,  No edema  GI:  Soft, Non distended, Non tender.  +Bowel sounds  Neurologic:  Alert and oriented X 3, normal speech,   Psych:   Good insight. Not anxious nor agitated  Skin:  No rashes. No jaundice    Reviewed most current lab test results and cultures  YES  Reviewed most current radiology test results   YES  Review and summation of old records today    NO  Reviewed patient's current orders and MAR    YES  PMH/ reviewed - no change compared to H&P  ________________________________________________________________________  Care Plan discussed with:    Comments   Patient x    Family  x wife   RN x    Care Manager     Consultant                        Multidiciplinary team rounds were held today with , nursing, pharmacist and clinical coordinator. Patient's plan of care was discussed; medications were reviewed and discharge planning was addressed.      ________________________________________________________________________  Total NON critical care TIME: 35    Minutes    Total CRITICAL CARE TIME Spent:   Minutes non procedure based      Comments   >50% of visit spent in counseling and coordination of care x     This includes time during multidisciplinary rounds if indicated above   ________________________________________________________________________  Sri Gutierrez MD     Procedures: see electronic medical records for all procedures/Xrays and details which were not copied into this note but were reviewed prior to creation of Plan. LABS:  I reviewed today's most current labs and imaging studies.   Pertinent labs include:  Recent Labs      06/03/18 0237  06/02/18   0640   WBC  6.3  10.0   HGB  14.1  14.6   HCT  41.0  41.1   PLT  150  196     Recent Labs      06/03/18 0237  06/02/18   0640   NA  140  137   K  3.8  3.6   CL  107  101   CO2  26  27   GLU  235*  209*   BUN  18  21*   CREA  0.91  1.10   CA  8.7  8.8   ALB   --   3.5   TBILI   --   0.3   SGOT   --   16   ALT   --   20   INR   --   1.1

## 2018-06-03 NOTE — PROGRESS NOTES
Problem: Falls - Risk of  Goal: *Absence of Falls  Document Maury Fall Risk and appropriate interventions in the flowsheet. Outcome: Progressing Towards Goal  Fall Risk Interventions:            Medication Interventions: Patient to call before getting OOB, Teach patient to arise slowly         History of Falls Interventions:  Investigate reason for fall, Room close to nurse's station

## 2018-06-04 ENCOUNTER — APPOINTMENT (OUTPATIENT)
Dept: ULTRASOUND IMAGING | Age: 53
End: 2018-06-04
Attending: INTERNAL MEDICINE
Payer: COMMERCIAL

## 2018-06-04 VITALS
WEIGHT: 224 LBS | HEART RATE: 65 BPM | BODY MASS INDEX: 30.34 KG/M2 | OXYGEN SATURATION: 96 % | TEMPERATURE: 98.7 F | HEIGHT: 72 IN | RESPIRATION RATE: 16 BRPM | SYSTOLIC BLOOD PRESSURE: 145 MMHG | DIASTOLIC BLOOD PRESSURE: 89 MMHG

## 2018-06-04 LAB
GLUCOSE BLD STRIP.AUTO-MCNC: 132 MG/DL (ref 65–100)
GLUCOSE BLD STRIP.AUTO-MCNC: 183 MG/DL (ref 65–100)
GLUCOSE BLD STRIP.AUTO-MCNC: 206 MG/DL (ref 65–100)
SERVICE CMNT-IMP: ABNORMAL

## 2018-06-04 PROCEDURE — 74011636637 HC RX REV CODE- 636/637: Performed by: INTERNAL MEDICINE

## 2018-06-04 PROCEDURE — 74011250636 HC RX REV CODE- 250/636: Performed by: INTERNAL MEDICINE

## 2018-06-04 PROCEDURE — 82962 GLUCOSE BLOOD TEST: CPT

## 2018-06-04 PROCEDURE — 74011250637 HC RX REV CODE- 250/637: Performed by: INTERNAL MEDICINE

## 2018-06-04 PROCEDURE — 99218 HC RM OBSERVATION: CPT

## 2018-06-04 PROCEDURE — 93970 EXTREMITY STUDY: CPT

## 2018-06-04 PROCEDURE — 96372 THER/PROPH/DIAG INJ SC/IM: CPT

## 2018-06-04 PROCEDURE — 93306 TTE W/DOPPLER COMPLETE: CPT

## 2018-06-04 RX ORDER — ENOXAPARIN SODIUM 150 MG/ML
150 INJECTION SUBCUTANEOUS DAILY
Qty: 7 ML | Refills: 1 | Status: SHIPPED | OUTPATIENT
Start: 2018-06-05 | End: 2018-06-04

## 2018-06-04 RX ORDER — ENOXAPARIN SODIUM 150 MG/ML
150 INJECTION SUBCUTANEOUS DAILY
Qty: 7 ML | Refills: 1 | Status: SHIPPED | OUTPATIENT
Start: 2018-06-05 | End: 2018-06-12

## 2018-06-04 RX ORDER — WARFARIN 7.5 MG/1
7.5 TABLET ORAL
Status: COMPLETED | OUTPATIENT
Start: 2018-06-04 | End: 2018-06-04

## 2018-06-04 RX ORDER — WARFARIN SODIUM 5 MG/1
5 TABLET ORAL
Qty: 30 TAB | Refills: 1 | Status: SHIPPED | OUTPATIENT
Start: 2018-06-04 | End: 2018-08-03

## 2018-06-04 RX ORDER — ENOXAPARIN SODIUM 100 MG/ML
50 INJECTION SUBCUTANEOUS ONCE
Status: COMPLETED | OUTPATIENT
Start: 2018-06-04 | End: 2018-06-04

## 2018-06-04 RX ADMIN — METOPROLOL SUCCINATE 100 MG: 50 TABLET, EXTENDED RELEASE ORAL at 09:11

## 2018-06-04 RX ADMIN — ACETAMINOPHEN 500 MG: 500 TABLET, FILM COATED ORAL at 10:49

## 2018-06-04 RX ADMIN — AMLODIPINE BESYLATE 15 MG: 5 TABLET ORAL at 16:19

## 2018-06-04 RX ADMIN — CHLORTHALIDONE 25 MG: 25 TABLET ORAL at 10:50

## 2018-06-04 RX ADMIN — CLONIDINE HYDROCHLORIDE 0.4 MG: 0.1 TABLET ORAL at 09:10

## 2018-06-04 RX ADMIN — CLOPIDOGREL BISULFATE 75 MG: 75 TABLET ORAL at 09:10

## 2018-06-04 RX ADMIN — WARFARIN SODIUM 7.5 MG: 7.5 TABLET ORAL at 16:19

## 2018-06-04 RX ADMIN — INSULIN LISPRO 5 UNITS: 100 INJECTION, SOLUTION INTRAVENOUS; SUBCUTANEOUS at 09:10

## 2018-06-04 RX ADMIN — ENOXAPARIN SODIUM 50 MG: 60 INJECTION SUBCUTANEOUS at 10:50

## 2018-06-04 RX ADMIN — ENOXAPARIN SODIUM 100 MG: 100 INJECTION SUBCUTANEOUS at 09:11

## 2018-06-04 RX ADMIN — Medication 10 ML: at 05:57

## 2018-06-04 RX ADMIN — VALSARTAN 320 MG: 160 TABLET ORAL at 09:11

## 2018-06-04 RX ADMIN — INSULIN LISPRO 5 UNITS: 100 INJECTION, SOLUTION INTRAVENOUS; SUBCUTANEOUS at 16:55

## 2018-06-04 NOTE — PROGRESS NOTES
Cardiopulmonary Care Interdisciplinary rounds were held today to discuss patient's plan of care and outcomes. The following members were present: NP/Physician, Pharmacy, Nursing and Case Management.     Expected Length of Stay:  - - -    Plan of Care: Continue current treatment plan  HH and lovenox coverage at home

## 2018-06-04 NOTE — PROGRESS NOTES
Pharmacy does not stock Vyvase (lisdexamfetamine)    Per discussion w/ patient: he/she will resume Vyvanse  when he/she is discharged. I removed Vyvanse from the patient's MAR. Thanks.   Claudia Wheatley, [facility]

## 2018-06-04 NOTE — PROGRESS NOTES
Pharmacy Daily Dosing of Warfarin    Indication: Acute pulmonary embolism    Goal INR: 2-3    PTA Warfarin Dose: NEW START    Concurrent anticoagulants: Enoxaparin 150 mg SUBQ daily (1.5 mg/kg) BRIDGE    Concurrent antiplatelet: NONE    Major Interacting Medications    Drugs that may increase INR: NONE   Drugs that may decrease INR: MAJOR drug interaction with carbamazepine    Conditions that may increase/decrease INR (CHF, C. diff, cirrhosis, thyroid disorder, hypoalbuminemia): N/A    Labs:  Recent Labs      06/03/18   0237  06/02/18   0640   INR   --   1.1   HGB  14.1  14.6   PLT  150  196   SGOT   --   16   TBILI   --   0.3   ALB   --   3.5           Impression/Plan:   -Patient being bridged with enoxaparin and new start warfarin for treatment of PE  -On carbamazepine, major drug interaction with all DOACs, also interaction with warfarin, but we can monitor INR while on warfarin and being bridged. Psychiatry may be evaluating whether carbamazepine may be discontinued (indication is for ADHD), but induction effects of carbamazepine may linger on for up to one month after stopping. Patient hesitant to be on enoxaparin for long period due to fear of injections (we are compromising with doing 1.5 mg/kg enoxaparin dosing so patient gets 1 injection daily rather than 2)  -INR monitoring while on warfarin and carbamazepine is crucial to maintain efficacy and safety.  -Will order warfarin 7.5 mg PO x 1 dose. -Patient to receive dose prior to discharge. Patient will be instructed not to take warfarin or enoxaparin today once he goes home. Pharmacy will follow daily and adjust the dose as appropriate. Thanks  Lui Pisano PHARMD      [email or web address]

## 2018-06-04 NOTE — CONSULTS
[facility] Consult Service Note  Pulmonary, Critical Care, and Sleep Medicine    Name: Misael Dickinson MRN: [id number]   : 1965 Hospital: [facility]   Date: 2018   Hospital Day: 3       Subjective/Interval History:   I have reviewed the notes from other providers and old records readily available and summarized findings below with the flowsheet. Seen earlier today on rounds. Pt feels pretty good this am. NO chest pain, no back pain, no neck pain. Feels like his breathing is pretty comfortable. He has seen Dr. Maren Joyner in past. Followed by DR. Ramos with Cardiology. 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 PE- high prob V/W scan. Hemodynamically stable on room air with no evidence of RV strain. No obvious inciting cause of PE. No clinical evidence to suggest DVT  2. Acute hypoxic respiratory failure- weaned off supplemental O2  3. Chronic RML calcified mass and adenopathy-s/p Bx . C/w chronic granulomatous insult  4. CVA- s/p TPA . mild left sided weakness  5. CAD- s/p MI and PCI   6. CKD          RECOMMENDATIONS/PLAN:   1. Therapeutic Lovenox  2. Agree with transition to [facility]  3. Would tentatively plan to treat for 6 months at which time we can complete a hypercoagulable work-up, pt can follow up with DR. Maren Joyner or his PCP   For the PE. He lives on the [facility]. 4. Supplemental O2 if needed  5. At this point no indication to obtain CT chest to further evaluate RML mass. CXR stable compared to 2013. Biopsy in 2013 ruled out malignancy. C/w granulomatous process  6. TTE pending  7. Order LE dopplers to rule out DVT  8. Encouraged regular age appropriate cancer screening    Will benefit from outpt pulmonary appointment in 2-4 weeks post discharge          My assessment/management discussed with: Consultants, Nursing, Pharmacy, Case Management, PT, OT, Respiratory Therapy, Hospitalist and Family for coordination of care        Subjective/Initial History:     I was asked by Anna Reyes DO to see Alpena Factor  a 52 y.o.  male in consultation for a chief complaint of PE    51 yo M with h/o CVA (s/p TPA 2016), DM, CKD, CAD (s/p PCI 2013) and prior granulomatous lung disease with chronic RML calcified mass presents with acute SOB and weakness suspicious for PE. The patient was in his usual state of health until yesterday when he was preapring to fly to [place]. While in the airport he experienced acute onset SOB and difficulty walking through the airport. Just prior to boarding he felt so weak he could not get on the plane. EMS was called and he was brought to the hospital. He denies recent fevers, chills, cough, sputum, URI-like symptoms. He also has no LE edema or swelling. He traveled to [place] in April but has had no pulmonary issues since. Of note he was followed by Dr Ela Kearns in our group back in 2013 for what was determined to be a calcified RML mass. He underwent CT guided biopsy which revealed no malignant cells. He grew up in [place] and spent a lot of time outdoors anf surrounded by chickens which may have resulted in the granulomatous disease. Otherwise he has no known pulmonary disease. Upon arrival a V/Q scan was completed which was read as high probability for PE for which pulm was consulted.  He was started on therapeutic Lovenox and remains on RA with no hypoxia. He currently feels much better and denies SOB and CP. He denies family h/o clots. He had a colonoscopy 5 years ago which was unremarkable. Allergies   Allergen Reactions    Iodinated Contrast- Oral And Iv Dye Anaphylaxis        MAR reviewed and pertinent medications noted or modified as needed     Current Facility-Administered Medications   Medication    apixaban (ELIQUIS) tablet 10 mg    [START ON 6/10/2018] apixaban (ELIQUIS) tablet 5 mg    enoxaparin (LOVENOX) injection 100 mg    amLODIPine (NORVASC) tablet 15 mg    atorvastatin (LIPITOR) tablet 80 mg    carBAMazepine (TEGretol) tablet 200 mg    chlorthalidone (HYGROTEN) tablet 25 mg    cloNIDine HCl (CATAPRES) tablet 0.4 mg    clopidogrel (PLAVIX) tablet 75 mg    cyclobenzaprine (FLEXERIL) tablet 10 mg    metoprolol succinate (TOPROL-XL) XL tablet 100 mg    mirtazapine (REMERON) tablet 15 mg    QUEtiapine (SEROquel) tablet 50 mg    tamsulosin (FLOMAX) capsule 0.4 mg    traZODone (DESYREL) tablet 50 mg    valsartan (DIOVAN) tablet 320 mg    sodium chloride (NS) flush 5-10 mL    sodium chloride (NS) flush 5-10 mL    acetaminophen (TYLENOL) tablet 500 mg    HYDROcodone-acetaminophen (NORCO) 5-325 mg per tablet 1 Tab    naloxone (NARCAN) injection 0.4 mg    ondansetron (ZOFRAN) injection 4 mg    bisacodyl (DULCOLAX) tablet 5 mg    morphine IR (MS IR) tablet 15 mg    insulin glargine (LANTUS) injection 42 Units    And    insulin lispro (HUMALOG) injection 5 Units      PMH:  has a past medical history of BPH (benign prostatic hyperplasia); CAD (coronary artery disease); CKD (chronic kidney disease), stage III; DM type 2 causing neurological disease ([address] DM type 2 causing renal disease ([address] DM type 2 causing vascular disease ([address] Dyslipidemia, goal LDL below 70; Essential hypertension, benign; History of acute inferior wall MI (5/30/2017); Lupus; Neuropathy; and Thoracic outlet syndrome.   PSH: has a past surgical history that includes hx orthopaedic (). FHX: family history includes Diabetes in his father; Heart Disease in his father and mother; Stroke in his father and mother. SHX:  reports that he has never smoked. He has never used smokeless tobacco. He reports that he drinks alcohol. He reports that he does not use illicit drugs. ROS:A comprehensive review of systems was negative except for that written in the HPI. Objective:     Vital Signs: Telemetry:    normal sinus rhythm Intake/Output:   Visit Vitals    BP (!) 159/98 (BP 1 Location: Right arm, BP Patient Position: At rest)    Pulse 62    Temp 97.7 °F (36.5 °C)    Resp 18    Ht 6' (1.829 m)    Wt 101.6 kg (224 lb)    SpO2 98%    BMI 30.38 kg/m2       Temp (24hrs), Av.3 °F (36.8 °C), Min:97.7 °F (36.5 °C), Max:98.5 °F (36.9 °C)        O2 Device: Room air O2 Flow Rate (L/min): 2 l/min       Wt Readings from Last 4 Encounters:   18 101.6 kg (224 lb)   18 101.5 kg (223 lb 12.8 oz)   17 107 kg (235 lb 14.3 oz)   17 108 kg (238 lb)          Intake/Output Summary (Last 24 hours) at 18 0817  Last data filed at 18 1833   Gross per 24 hour   Intake             1260 ml   Output                0 ml   Net             1260 ml       Last shift:         Last 3 shifts:  1901 -  0700  In: 1260 [P.O.:1260]  Out: 400 [Urine:400]       Physical Exam:    General:   male; in no apparent distress;normal speech. HEAD: Normocephalic, without obvious abnormality, atraumatic   EYES: conjunctivae clear. PERRL,  AN Icteric sclerae   NOSE: nares normal, no drainage, no nasal flaring,    THROAT: normal; Lips, mucosa dry;   Neck: Supple, symmetrical, trachea midline,  No accessory mm use; Chest: normal   Lungs: normal air entry, Clear to auscultation.     Heart: Regular rate and rhythm;NO edema   Abdomen: soft, non-tender, without masses or organomegaly   : deferred;    Neuro:AAO3, mild left sided weakness   Psych: oriented to time, place and person;     Skin: Skin unremarkable;      Data:     All lab results for the last 24 hours reviewed. Labs:    Recent Labs      06/03/18   0237  06/02/18   0640   WBC  6.3  10.0   HGB  14.1  14.6   PLT  150  196   INR   --   1.1     Recent Labs      06/03/18   0237  06/02/18   0645  06/02/18   0640   NA  140   --   137   K  3.8   --   3.6   CL  107   --   101   CO2  26   --   27   GLU  235*   --   209*   BUN  18   --   21*   CREA  0.91   --   1.10   CA  8.7   --   8.8   LAC   --   1.9   --    ALB   --    --   3.5   SGOT   --    --   16   ALT   --    --   20     No results for input(s): PH, PCO2, PO2, HCO3, FIO2 in the last 72 hours. Recent Labs      06/02/18   0640   CPK  84   CKNDX  2.9*   TROIQ  <0.04     No results found for: BNPP, BNP   Lab Results   Component Value Date/Time    Culture result: NO GROWTH 1 DAY 06/02/2018 06:40 AM    Culture result: NO GROWTH 2 DAYS 05/28/2016 04:15 PM    Culture result: LIGHT  STAPHYLOCOCCUS AUREUS   01/27/2016 08:06 AM   No results found for: TSH, TSHEXT, TSHEXT    Imaging:            Results from [place] encounter on 06/02/18   XR CHEST PORT   Narrative INDICATION:  Chest Pain/hypoxia     EXAM: Portable chest 0721 hours. Comparison September 1, 2013    FINDINGS: Cardiac silhouette is unchanged upper limits of normal. Pulmonary  vasculature is not engorged. Oval density in the right mid chest is again noted,  not significantly changed. There is no pneumothorax or effusion. Impression IMPRESSION:  1. Indeterminate oval density in the right mid chest. This is in a similar  position to airspace disease in the previous study 2013. Recommend follow-up in  2-3 weeks to ensure resolution. Results from [place] encounter on 06/02/18   CT HEAD WO CONT   Narrative EXAM:  CT HEAD WO CONT    INDICATION:   Headache; weakness    COMPARISON: June 23, 2017. CONTRAST:  None.     TECHNIQUE: Unenhanced CT of the head was performed using 5 mm images. Brain and  bone windows were generated. CT dose reduction was achieved through use of a  standardized protocol tailored for this examination and automatic exposure  control for dose modulation. FINDINGS:  The ventricles and sulci are normal in size, shape and configuration and  midline. There is no significant white matter disease. There is no intracranial  hemorrhage, extra-axial collection, mass, mass effect or midline shift. The  basilar cisterns are open. No acute infarct is identified. The bone windows  demonstrate no abnormalities. The visualized portions of the paranasal sinuses  and mastoid air cells are clear. Impression IMPRESSION:    No acute intracranial abnormality          I have personally and independently reviewed the patients interval and diagnostic data, radiographs and have reviewed the reports. I have ordered additional labs to follow the current medical conditions and to monitor treatment responses over the next 24 hours or sooner if needed. If the pt needs,  additional imaging will be obtained to follow longitudinal changes found on the most current imaging. Thank you for allowing us to participate in the care of this patient. We will be happy to follow with you.     Medical Decision Making Today: I have personally:  · Requiring [place] controlled substances - Adjusted / Pel[place]o Jessica / Started  · Drug therapy requiring intensive monitoring for toxicity: eg systemic steroids, etc  · Review and order of Clinical lab tests  · Review and Order of Radiology tests  · Review and Order of Medicine tests  · Independent visualization of Image  · Review and summarize records or history from previous days notes   · Reviewed Ventilator / NiPPV  · I have personally reviewed the patients ECG / Telemetry  · Abrupt change in neurologic status  · Diagnostic endoscopies with identified risk factors    Ronda Sauer MD

## 2018-06-04 NOTE — DISCHARGE INSTRUCTIONS
Warfarin (Coumadin, Jantoven) - (By mouth)   Why this medicine is used:   Prevents and treats blood clots. Contact a nurse or doctor right away if you have:  · Sudden or severe headache  · Red or dark brown urine, or red or black, tarry stools  · Bloody vomit or vomit that looks like coffee grounds  · Bleeding that does not stop or bruises that do not heal  · Purplish red, net-like, blotchy spots on the skin     Common side effects:  · Minor bleeding or bruising  ©  [employer] is for End User's use only and may not be sold, redistributed or otherwise used for commercial purposes. Milk Activation    Thank you for requesting access to Milk. Please follow the instructions below to securely access and download your online medical record. Milk allows you to send messages to your doctor, view your test results, renew your prescriptions, schedule appointments, and more. How Do I Sign Up? 1. In your internet browser, go to [email or web address]  2. Click on the First Time User? Click Here link in the Sign In box. You will be redirect to the New Member Sign Up page. 3. Enter your Milk Access Code exactly as it appears below. You will not need to use this code after youve completed the sign-up process. If you do not sign up before the expiration date, you must request a new code. Milk Access Code: Activation code not generated  Current Milk Status: Active (This is the date your Milk access code will )    4. Enter the last four digits of your Social Security Number (xxxx) and Date of Birth (mm/dd/yyyy) as indicated and click Submit. You will be taken to the next sign-up page. 5. Create a Milk ID. This will be your Milk login ID and cannot be changed, so think of one that is secure and easy to remember. 6. Create a Milk password. You can change your password at any time. 7. Enter your Password Reset Question and Answer. This can be used at a later time if you forget your password. 8. Enter your e-mail address. You will receive e-mail notification when new information is available in [address] 9. Click Sign Up. You can now view and download portions of your medical record. 10. Click the Download Summary menu link to download a portable copy of your medical information. Additional Information    If you have questions, please visit the Frequently Asked Questions section of the [employer] website at [email or web address] Remember, [employer] is NOT to be used for urgent needs. For medical emergencies, dial 911. HOSPITALIST DISCHARGE INSTRUCTIONS    NAME: Nasir العلي   :  1965   MRN:  [id number]     Date/Time:  2018 10:28 AM    ADMIT DATE: 2018   DISCHARGE DATE: 2018         · It is important that you take the medication exactly as they are prescribed. · Keep your medication in the bottles provided by the pharmacist and keep a list of the medication names, dosages, and times to be taken in your wallet. · Do not take other medications without consulting your doctor. What to do at [address]:  Cardiac Diet    Recommended activity: Activity as tolerated      If you have questions regarding the hospital related prescriptions or hospital related issues please call [facility] at [phone].  You can always direct your questions to your primary care doctor if you are unable to reach your hospital physician; your PCP works as an extension of your hospital doctor just like your hospital doctor is an extension of your PCP for your time at the [facility], [facility])    If you experience any of the following symptoms then please call your primary care physician or return to the emergency room if you cannot get hold of your doctor:    Fever, chills, nausea, vomiting, or persistent diarrhea  Worsening weakness or new problems with your speech or balance  Dark stools or visible blood in your stools  New Leg swelling or shortness of breath as these could be signs of a clot    Additional Instructions:      Have your PT INR checked every Monday Wednesday and Friday and have the results sent to your doctor as your warfarin dose will need adjustments to achieve a target PT INR level of between 2-3. Information obtained by :  I understand that if any problems occur once I am at home I am to contact my physician. I understand and acknowledge receipt of the instructions indicated above.                                                                                                                                            Physician's or R.N.'s Signature                                                                  Date/Time                                                                                                                                              Patient or Representative Signature

## 2018-06-04 NOTE — PROGRESS NOTES
Pharmacist Discharge Medication Reconciliation    Significant PMH:   Past Medical History:   Diagnosis Date    BPH (benign prostatic hyperplasia)     CAD (coronary artery disease)     multivessel    CKD (chronic kidney disease), stage III     DM type 2 [facility] disease [facility]     DM type 2 causing renal disease ([facility]     DM type 2 causing vascular disease (HCC)     Dyslipidemia, goal LDL below 79     Essential hypertension, benign     History of acute inferior wall MI 5/30/2017    Lupus     Neuropathy     Thoracic outlet syndrome      Chief Complaint for this Admission:   Chief Complaint   Patient presents with    Lethargy     Allergies: Iodinated contrast- oral and iv dye    Discharge Medications:   Current Discharge Medication List        START taking these medications    Details   warfarin (COUMADIN) 5 mg tablet Take 1 Tab by mouth nightly for 60 days. Qty: 30 Tab, Refills: 1      enoxaparin (LOVENOX) injection 150 mg by SubCUTAneous route daily for 7 days. Qty: 7 mL, Refills: 1           CONTINUE these medications which have NOT CHANGED    Details   metoprolol succinate (TOPROL-XL) 100 mg tablet Take 100 mg by mouth daily. mirtazapine (REMERON) 15 mg tablet Take  by mouth nightly. insulin lispro (HUMALOG U-100 INSULIN) 100 unit/mL injection by SubCUTAneous route four (4) times daily as needed (sliding scale). carBAMazepine (TEGRETOL) 200 mg tablet Take 200 mg by mouth two (2) times a day. chlorthalidone (HYGROTEN) 25 mg tablet Take  by mouth daily. cloNIDine HCl (CATAPRES) 0.1 mg tablet Take 0.4 mg by mouth two (2) times a day. insulin lispro protamine/insulin lispro (HUMALOG MIX 75-25) 100 unit/mL (75-25) injection 35 Units by SubCUTAneous route two (2) times a day. valsartan (DIOVAN) 160 mg tablet Take 320 mg by mouth daily. QUEtiapine (SEROQUEL) 25 mg tablet Take 50 mg by mouth nightly as needed.       lisdexamfetamine (VYVANSE) 20 mg capsule Take 50 mg by mouth daily. cyclobenzaprine (FLEXERIL) 10 mg tablet Take 10 mg by mouth as needed. amLODIPine (NORVASC) 10 mg tablet Take 1 Tab by mouth Daily (before dinner). Qty: 30 Tab, Refills: 5      atorvastatin (LIPITOR) 40 mg tablet Take 2 Tabs by mouth nightly. Qty: 30 Tab, Refills: 11      clopidogrel (PLAVIX) 75 mg tablet Take 1 Tab by mouth daily. Qty: 30 Tab, Refills: 2      tamsulosin (FLOMAX) 0.4 mg capsule Take 0.4 mg by mouth every evening. traZODone (DESYREL) 50 mg tablet Take 50 mg by mouth as needed. The patient's chart, MAR and AVS were reviewed by SIVAN Huertas.    Discharging Provider: Dr. Gina Hardy    -Follow up appointment to monitor INR on bridging with PCP on Wednesday scheduled per Dr. Gina Hardy.     Thank Alpesh Sandhu, PHARMD

## 2018-06-04 NOTE — PROGRESS NOTES
Patient is discharged. He has received instruction on coumadin and lovenox and understands these meds, their purpose, risk and monitoring the INR. He will follow up with primary physician for INR.

## 2018-06-04 NOTE — PROGRESS NOTES
Patient waiting for Doppler can and ECHO . I instructed patient on administration of Lovenox and he demonstrated injection well.

## 2018-06-04 NOTE — ROUTINE PROCESS
Contacted the PT and made them aware that [facility] will call to make appointment in 24-48 hours  Second Initial PCP [facility] appt scheduled with Dr. Shwetha Guadarrama on 6/25/2018 at 10:00am Appt added to [address] [facility] Specialist

## 2018-06-04 NOTE — PROGRESS NOTES
Anticoagulation update:    Apixaban carries a significant drug-drug interaction with Carbamazepine, which reduces the efficacy of Apixaban. Concomitant use is strongly discouraged. Discussed with Dr. Madeline Trimble as Dr. Francine Sicard had left for the day. He agreed to resume treatment dose Lovenox tonight and to have clinicians discuss alternatives with patient in the morning. (Note, Rivaroxaban carries the same interaction warning as Apixaban)    Informed ANGEL LUIS Sutherland, who informed patient. Pharmacy will collaborate with Dr. Francine Sicard in the morning in selection of alternative outpatient therapy. Drug-Drug Interaction Report    MONOGRAPH TITLE: Apixaban; Rivaroxaban/P-gp and Strong CYP 3A4 Inducers    SEVERITY LEVEL: 2-Severe Interaction: Action is required to reduce the risk of severe adverse interaction. CLINICAL EFFECTS: Concurrent or recent use of apalutamide, carbamazepine, phenytoin, rifampin, or [place]'s wort may result in decreased levels and effectiveness of apixaban(1-4) or rivaroxaban. (5)    Interacting Medications/Orders:  APIXABAN; RIVAROXABAN P-GP AND STRONG CYP 3A4 INDUCERS   1. apixaban  § Order ([id number]): apixaban (ELIQUIS) tablet 10 mg Route: Oral  Start: 2018 2000 End: 06/10/2018 1759 Frequency: 2 TIMES DAILY 1. carBAMazepine  § Order ([id number]): carBAMazepine (TEGretol) tablet 200 mg Route: Oral  Start: 2018 1800 End: none Frequency: 2 TIMES DAILY     MECHANISM OF ACTION: Apalutamide, carbamazepine, phenytoin, rifampin, and John's wort may induce the metabolism of apixaban(1-4) and rivaroxaban(5) by both P-gp and CY. PREDISPOSING FACTORS: None determined. PATIENT MANAGEMENT: Avoid the concurrent use of agents that are combined P-gp and strong CY inducers, such as apalutamide, carbamazepine, phenytoin, rifampin, and [place]'s wort, in patients receiving apixaban or rivaroxaban.     DISCUSSION: Concurrent rifampin decreased the area-under-curve (AUC) and maximum concentration (Cmax) of apixaban by 54% and 42%, respectively. (1-4) In a clinical trial, rifampin (600 mg daily) decreased the AUC and Cmax of a single dose of rivaroxaban (20 mg with food) by 50% and 22%,respectively. Similar decreases in pharmacodynamic effects were seen. (5)    REFERENCES: 1.Eliquis (apixaban) [facility] prescribing information. [address] [place] [address] P[employer]. Ltd. July 21, 2011. 2.Eliquis (apixaban) [place] prescribing information. [employer] December, 13, 2011. 3.Eliquis (apixaban) [place] summary of product characterstics. [employer] November 19, 2012. 4.Eliquis (apixaban) [place] prescribing information. [facility]. February 9, 2018. 5.Xarelto (rivaroxaban) [place] prescribing information. [address] [place] [address]. February, 2018.     Data Source: Paras Triana6  Version Date: 4/12/2018

## 2018-06-04 NOTE — PROGRESS NOTES
Initial Assessment/Discharge note    Reason for Admission:   Pulmonary embolism; type 2 DM with CKD and hypertension; H/O stroke               RRAT Score:  21                Resources/supports as identified by patient/family:   wife                Top Challenges facing patient (as identified by patient/family and CM): Finances/Medication cost?      No issues reported at this time. Transportation? No issues reported at this time. Support system or lack thereof? No issues reported at this time. Living arrangements? No issues reported at this time. Self-care/ADLs/Cognition? Pt is independent in managing ADLs including driving. Current Advanced Directive/Advance Care Plan:  Not on file. Plan for utilizing home health:   No recommendations made by treatment team at this time. Likelihood of readmission: low. Transition of Care Plan:   Pt is to discharge home with family. Pt is a 53 yo male admitted on 6/2/18 for Pulmonary embolism; type 2 DM with CKD and hypertension. During encounter, Pt was off the floor, however wife was bedside. Pt provided CM with information regarding assessment. Pt is reported to live in a 1 story home (2-3 steps) with wife. Pt has access to a cane at home which he does not use often. Pt is independent in managing ADLs  Including driving. Pt has not had any history of receiving [facility] services. Pt has received inpatient and outpatient rehabilitation services with [facility]. At discharge, wife stated that she plans to work from home for the week to be with Pt. CM contacted [facility] for medication pricing for Lovenox (Enoxaparin 150 mg SUBQ daily)  CM was informed that the cost would be $50 for prescription.  CM notified attending of cost. Attending stated that he would follow-up with Pt and wife with cost.     [facility] follow-up  6/25/18 10:00 am    Dispatch health       6/6/18       Pt is to discharge with wife in private vehicle. No further CM needs at this time. Care Management Interventions  PCP Verified by CM: Yes  Mode of Transport at Discharge:  Other (see comment) (private vehicle wife)  Transition of Care Consult (CM Consult): Discharge Planning (home with dispatch health)  Discharge Durable Medical Equipment:  (Pt has access to cane at home)  Physical Therapy Consult: No  Occupational Therapy Consult: No  Speech Therapy Consult: No  Current Support Network: Lives with Spouse, Own Home (wife and son)  Plan discussed with Pt/Family/Caregiver: Yes  Discharge Location  Discharge Placement: [address], [facility], [address] [facility]   [phone]

## 2018-06-04 NOTE — DISCHARGE SUMMARY
Hospitalist Discharge Summary     Patient ID:  Cosme Lutz  [id number]  20 y.o.  1965    PCP on record: Sendy Hopper MD    Admit date: 6/2/2018  Discharge date and time: 6/4/2018      DISCHARGE DIAGNOSIS:    Acute pulmonary embolism  Chronic right chest density  Hx Aspergillosis  DM  HTN / CAD, Hx stent  Hx CVA 2016  Hx Anxiety / depression  30.0 - 39.9 Obese  Body mass index is 30.38 kg/(m^2). CONSULTATIONS:  IP CONSULT TO HOSPITALIST  IP CONSULT TO PULMONOLOGY    Excerpted HPI from H&P of Chacha Sullivan, DO:  CHIEF COMPLAINT: feeling of heaviness     HISTORY OF PRESENT ILLNESS:     Migdalia Fitzgerald is a 52 y.o.  male who presents with feeling heavy and shortness of breath, feeling sluggish, [place]. Pt sat down, and ambulance was called. Pt denies long sitting periods prior to episode, edema, pain in legs. Pt does admit to vague left mid clavicular chest pain, sharp in nature, worse with deep inspiration, resting made it better, non radiating lasting only seconds at a time for the past month. Pt also complains of gasping for air that woke him out of sleep the past month. Pt denies prior pul embolism, and is only on plavix. Pt was tested for lupus which was negative by cardiologist in past.      In [place], pt had high prob vq scan.     We were asked to admit for work up and evaluation of the above problems. ______________________________________________________________________  DISCHARGE SUMMARY/HOSPITAL COURSE:  for full details see H&P, daily progress notes, labs, consult notes.      Acute pulmonary embolism  -VQ scan  high probability study for pulmonary embolism with large mismatched defect involving the right upper lobe as well as the superior segment of the right lower lobe, with normal associated ventilation.  -no family history but endorses sedentary lifestyle and recent trip   -Echo  EF 55-60%, no AS, no MR  -Leg dopplers pending  -After some discussion yesterday, he has agreed to try transition to eliquis but pharmacist pointed out interaction with tegretol and this effect lingers for 4 weeks. After further discussions , the patient will be discharged on coumadin with lovenox bridge. He will follow up with his PCP for INR checks and coumadin adjustments  -follow up with pulmonary in 2-4 weeks  -not hypoxic.      Chronic right chest density  Hx Aspergillosis  -he reports having had a negative biopsy in the past  -CXR stable. Appreciate pulm input.      DM  -continue lantus and SSI prn     HTN / CAD, Hx stent  -continue plavix, metoprolol, ARB, clonidine and statin     Hx CVA 2016  Hx Anxiety / depression  -continue meds as ordered.       30.0 - 39.9 Obese  Body mass index is 30.38 kg/(m^2). _______________________________________________________________________  Patient seen and examined by me on discharge day. Pertinent Findings:  Gen:    Not in distress  Chest: Clear lungs  CVS:   Regular rhythm. No edema  Abd:  Soft, not distended, not tender  Neuro:  Alert, Oriented x 4, grossly non focal exam  _______________________________________________________________________  DISCHARGE MEDICATIONS:   Current Discharge Medication List      START taking these medications    Details   enoxaparin (LOVENOX) injection 150 mg by SubCUTAneous route daily for 7 days. Qty: 7 mL, Refills: 1      warfarin (COUMADIN) 5 mg tablet Take 1 Tab by mouth nightly for 60 days. Qty: 30 Tab, Refills: 1         CONTINUE these medications which have NOT CHANGED    Details   metoprolol succinate (TOPROL-XL) 100 mg tablet Take 100 mg by mouth daily. mirtazapine (REMERON) 15 mg tablet Take  by mouth nightly. insulin lispro (HUMALOG U-100 INSULIN) 100 unit/mL injection by SubCUTAneous route four (4) times daily as needed (sliding scale). carBAMazepine (TEGRETOL) 200 mg tablet Take 200 mg by mouth two (2) times a day. chlorthalidone (HYGROTEN) 25 mg tablet Take  by mouth daily. cloNIDine HCl (CATAPRES) 0.1 mg tablet Take 0.4 mg by mouth two (2) times a day. insulin lispro protamine/insulin lispro (HUMALOG MIX 75-25) 100 unit/mL (75-25) injection 35 Units by SubCUTAneous route two (2) times a day. valsartan (DIOVAN) 160 mg tablet Take 320 mg by mouth daily. QUEtiapine (SEROQUEL) 25 mg tablet Take 50 mg by mouth nightly as needed. lisdexamfetamine (VYVANSE) 20 mg capsule Take 50 mg by mouth daily. cyclobenzaprine (FLEXERIL) 10 mg tablet Take 10 mg by mouth as needed. amLODIPine (NORVASC) 10 mg tablet Take 1 Tab by mouth Daily (before dinner). Qty: 30 Tab, Refills: 5      atorvastatin (LIPITOR) 40 mg tablet Take 2 Tabs by mouth nightly. Qty: 30 Tab, Refills: 11      clopidogrel (PLAVIX) 75 mg tablet Take 1 Tab by mouth daily. Qty: 30 Tab, Refills: 2      tamsulosin (FLOMAX) 0.4 mg capsule Take 0.4 mg by mouth every evening. traZODone (DESYREL) 50 mg tablet Take 50 mg by mouth as needed. My Recommended Diet, Activity, Wound Care, and follow-up labs are listed in the patient's Discharge Insturctions which I have personally completed and reviewed.   Risk of deterioration: Low    Condition at Discharge:  Stable  _____________________________________________________________________    Disposition  Home with family, no needs  ____________________________________________________________________    Care Plan discussed with:   Patient, Family, RN, Care Manager, Consultant    ____________________________________________________________________    Code Status: Full Code  ____________________________________________________________________      Condition at Discharge:  Stable  _____________________________________________________________________  Follow up with:   PCP : Bee Hinojosa MD  Follow-up Information     Follow up With Details [address], [place] In 1 week  [address] [place]  [phone]      Lisa Barry MD In 3 weeks  [address]  [phone]                Total time in minutes spent coordinating this discharge (includes going over instructions, follow-up, prescriptions, and preparing report for sign off to her PCP) :  35 minutes    Signed:  Elver Cardona MD

## 2018-06-04 NOTE — PROGRESS NOTES
Hospitalist Progress Note    NAME: Irma Lamar   :  1965   MRN:  [id number]     Interim Hospital Summary: 52 y.o. male whom presented on 2018 with      Assessment / Plan:    Acute pulmonary embolism  -VQ scan  high probability study for pulmonary embolism with large mismatched defect involving the right upper lobe as well as the superior segment of the right lower lobe, with normal associated ventilation.  -no family history but endorses sedentary lifestyle and recent trip   -check Echo  -Leg dopplers pending  -After some discussion yesterday, he has agreed to try transition to eliquis but pharmacist pointed out interaction with tegretol and this effect lingers for 4 weeks. After further discussions today, the patient will be discharged on coumadin with lovenox bridge.   -follow up with pulmonary in 2-4 weeks    Chronic right chest density  Hx Aspergillosis  -he reports having had a negative biopsy in the past  -CXR stable. Appreciate pulm input. DM  -continue lantus and SSI prn    HTN / CAD, Hx stent  -continue plavix, metoprolol, ARB, clonidine and statin    Hx CVA 2016  Hx Anxiety / depression  -continue meds as ordered. 30.0 - 39.9 Obese  Body mass index is 30.38 kg/(m^2). Code status: Full  Prophylaxis: Lovenox  Recommended Disposition: Home w/Family       Subjective:     Chief Complaint / Reason for Physician Visit  Follow up of  PE. HTN CAD, DM  Chart reviewed in detail. Discussed with RN events overnight.        Review of Systems:  Symptom Y/N Comments  Symptom Y/N Comments   Fever/Chills    Chest Pain  better   Poor Appetite    Edema     Cough    Abdominal Pain     Sputum    Joint Pain     SOB/KNIGHT n   Pruritis/Rash     Nausea/vomit    Tolerating PT/OT     Diarrhea    Tolerating Diet     Constipation    Other       Could NOT obtain due to:      PO intake:   Patient Vitals for the past 72 hrs:   % Diet Eaten   18 1833 95 % 06/03/18 1320 95 %   06/03/18 0846 100 %     Objective:     VITALS:   Last 24hrs VS reviewed since prior progress note. Most recent are:  Patient Vitals for the past 24 hrs:   Temp Pulse Resp BP SpO2   06/04/18 0340 98.5 °F (36.9 °C) 67 18 (!) 151/91 97 %   06/03/18 2253 98.5 °F (36.9 °C) 69 18 (!) 153/97 98 %   06/03/18 1910 98.5 °F (36.9 °C) 70 18 171/90 94 %   06/03/18 1454 98.3 °F (36.8 °C) 67 18 (!) 152/93 96 %   06/03/18 1135 98.5 °F (36.9 °C) 67 - (!) 168/92 96 %   06/03/18 0746 97.9 °F (36.6 °C) 68 18 153/90 94 %       Intake/Output Summary (Last 24 hours) at 06/04/18 0739  Last data filed at 06/03/18 1833   Gross per 24 hour   Intake             1260 ml   Output                0 ml   Net             1260 ml        PHYSICAL EXAM:  General: WD, WN. Alert, cooperative, no acute distress    EENT:  EOMI. Anicteric sclerae. MMM  Resp:  CTA bilaterally, no wheezing or rales. No accessory muscle use  CV:  Regular  rhythm,  No edema  GI:  Soft, Non distended, Non tender.  +Bowel sounds  Neurologic:  Alert and oriented X 3, normal speech,   Psych:   Good insight. Not anxious nor agitated  Skin:  No rashes. No jaundice    Reviewed most current lab test results and cultures  YES  Reviewed most current radiology test results   YES  Review and summation of old records today    NO  Reviewed patient's current orders and MAR    YES  PMH/SH reviewed - no change compared to H&P  ________________________________________________________________________  Care Plan discussed with:    Comments   Patient x    Family  x wife   RN x    Care Manager     Consultant                        Multidiciplinary team rounds were held today with , nursing, pharmacist and clinical coordinator. Patient's plan of care was discussed; medications were reviewed and discharge planning was addressed.      ________________________________________________________________________  Total NON critical care TIME: 35    Minutes    Total CRITICAL CARE TIME Spent:   Minutes non procedure based      Comments   >50% of visit spent in counseling and coordination of care x     This includes time during multidisciplinary rounds if indicated above   ________________________________________________________________________  Renetta Valencia MD     Procedures: see electronic medical records for all procedures/Xrays and details which were not copied into this note but were reviewed prior to creation of Plan. LABS:  I reviewed today's most current labs and imaging studies.   Pertinent labs include:  Recent Labs      06/03/18 0237 06/02/18   0640   WBC  6.3  10.0   HGB  14.1  14.6   HCT  41.0  41.1   PLT  150  196     Recent Labs      06/03/18   0237  06/02/18   0640   NA  140  137   K  3.8  3.6   CL  107  101   CO2  26  27   GLU  235*  209*   BUN  18  21*   CREA  0.91  1.10   CA  8.7  8.8   ALB   --   3.5   TBILI   --   0.3   SGOT   --   16   ALT   --   20   INR   --   1.1

## 2018-06-07 LAB
BACTERIA SPEC CULT: NORMAL
SERVICE CMNT-IMP: NORMAL

## 2018-09-08 ENCOUNTER — HOSPITAL ENCOUNTER (EMERGENCY)
Age: 53
Discharge: HOME | End: 2018-09-08
Attending: STUDENT IN AN ORGANIZED HEALTH CARE EDUCATION/TRAINING PROGRAM
Payer: COMMERCIAL

## 2018-09-08 VITALS
BODY MASS INDEX: 29.8 KG/M2 | SYSTOLIC BLOOD PRESSURE: 123 MMHG | RESPIRATION RATE: 16 BRPM | HEIGHT: 72 IN | HEART RATE: 63 BPM | DIASTOLIC BLOOD PRESSURE: 83 MMHG | TEMPERATURE: 98.7 F | OXYGEN SATURATION: 98 % | WEIGHT: 220 LBS

## 2018-09-08 DIAGNOSIS — R79.1 SUPRATHERAPEUTIC INR: Primary | ICD-10-CM

## 2018-09-08 DIAGNOSIS — K06.8 BLEEDING GUMS: ICD-10-CM

## 2018-09-08 LAB
ALBUMIN SERPL-MCNC: 4 G/DL (ref 3.5–5)
ALBUMIN/GLOB SERPL: 1.1 {RATIO} (ref 1.1–2.2)
ALP SERPL-CCNC: 147 U/L (ref 45–117)
ALT SERPL-CCNC: 34 U/L (ref 12–78)
ANION GAP SERPL CALC-SCNC: 6 MMOL/L (ref 5–15)
AST SERPL-CCNC: 15 U/L (ref 15–37)
BASOPHILS # BLD: 0.1 K/UL (ref 0–0.1)
BASOPHILS NFR BLD: 1 % (ref 0–1)
BILIRUB SERPL-MCNC: 0.9 MG/DL (ref 0.2–1)
BUN SERPL-MCNC: 25 MG/DL (ref 6–20)
BUN/CREAT SERPL: 20 (ref 12–20)
CALCIUM SERPL-MCNC: 9.6 MG/DL (ref 8.5–10.1)
CHLORIDE SERPL-SCNC: 94 MMOL/L (ref 97–108)
CO2 SERPL-SCNC: 36 MMOL/L (ref 21–32)
CREAT SERPL-MCNC: 1.25 MG/DL (ref 0.7–1.3)
DIFFERENTIAL METHOD BLD: NORMAL
EOSINOPHIL # BLD: 0.3 K/UL (ref 0–0.4)
EOSINOPHIL NFR BLD: 3 % (ref 0–7)
ERYTHROCYTE [DISTWIDTH] IN BLOOD BY AUTOMATED COUNT: 13.2 % (ref 11.5–14.5)
GLOBULIN SER CALC-MCNC: 3.8 G/DL (ref 2–4)
GLUCOSE SERPL-MCNC: 368 MG/DL (ref 65–100)
HCT VFR BLD AUTO: 44.3 % (ref 36.6–50.3)
HGB BLD-MCNC: 15.8 G/DL (ref 12.1–17)
IMM GRANULOCYTES # BLD: 0 K/UL (ref 0–0.04)
IMM GRANULOCYTES NFR BLD AUTO: 0 % (ref 0–0.5)
INR PPP: 8.6 (ref 0.9–1.1)
LYMPHOCYTES # BLD: 1.3 K/UL (ref 0.8–3.5)
LYMPHOCYTES NFR BLD: 15 % (ref 12–49)
MCH RBC QN AUTO: 29 PG (ref 26–34)
MCHC RBC AUTO-ENTMCNC: 35.7 G/DL (ref 30–36.5)
MCV RBC AUTO: 81.3 FL (ref 80–99)
MONOCYTES # BLD: 0.6 K/UL (ref 0–1)
MONOCYTES NFR BLD: 6 % (ref 5–13)
NEUTS SEG # BLD: 6.7 K/UL (ref 1.8–8)
NEUTS SEG NFR BLD: 75 % (ref 32–75)
NRBC # BLD: 0 K/UL (ref 0–0.01)
NRBC BLD-RTO: 0 PER 100 WBC
PLATELET # BLD AUTO: 198 K/UL (ref 150–400)
PMV BLD AUTO: 10.2 FL (ref 8.9–12.9)
POTASSIUM SERPL-SCNC: 3.5 MMOL/L (ref 3.5–5.1)
PROT SERPL-MCNC: 7.8 G/DL (ref 6.4–8.2)
PROTHROMBIN TIME: 77.7 SEC (ref 9–11.1)
RBC # BLD AUTO: 5.45 M/UL (ref 4.1–5.7)
SODIUM SERPL-SCNC: 136 MMOL/L (ref 136–145)
WBC # BLD AUTO: 9 K/UL (ref 4.1–11.1)

## 2018-09-08 PROCEDURE — 99283 EMERGENCY DEPT VISIT LOW MDM: CPT

## 2018-09-08 PROCEDURE — 36415 COLL VENOUS BLD VENIPUNCTURE: CPT | Performed by: STUDENT IN AN ORGANIZED HEALTH CARE EDUCATION/TRAINING PROGRAM

## 2018-09-08 PROCEDURE — 74011250637 HC RX REV CODE- 250/637: Performed by: STUDENT IN AN ORGANIZED HEALTH CARE EDUCATION/TRAINING PROGRAM

## 2018-09-08 PROCEDURE — 80053 COMPREHEN METABOLIC PANEL: CPT | Performed by: STUDENT IN AN ORGANIZED HEALTH CARE EDUCATION/TRAINING PROGRAM

## 2018-09-08 PROCEDURE — 85025 COMPLETE CBC W/AUTO DIFF WBC: CPT | Performed by: STUDENT IN AN ORGANIZED HEALTH CARE EDUCATION/TRAINING PROGRAM

## 2018-09-08 PROCEDURE — 85610 PROTHROMBIN TIME: CPT | Performed by: STUDENT IN AN ORGANIZED HEALTH CARE EDUCATION/TRAINING PROGRAM

## 2018-09-08 RX ADMIN — PHYTONADIONE 1.25 MG: 10 INJECTION, EMULSION INTRAMUSCULAR; INTRAVENOUS; SUBCUTANEOUS at 11:17

## 2018-09-08 NOTE — DISCHARGE INSTRUCTIONS
High INR Test Result: Care Instructions  Your Care Instructions    You had a blood test to check how long it takes your blood to clot. This test is called a PT or prothrombin time test. The result of the test is called the INR level. A high INR level can happen when you take warfarin (Coumadin). Warfarin helps prevent blood clots. To do this, it slows the amount of time it takes for your blood to clot. This raises your INR level. The INR goal for people who take warfarin is usually from 2 to 3. A value higher than 3.5 increases the risk of bleeding problems. Many things can affect the way warfarin works. Some natural health products and other medicines can make warfarin work too well. That can raise the risk of bleeding. If you drink a lot of alcohol, that may raise your INR. And severe diarrhea or vomiting can also raise your INR. The best way to lower your INR will depend on several things. In some cases, the doctor may have you stop taking warfarin for a few days. You may also be given other medicines to take. You will need to be tested often to make sure your INR level is going down. You will also need to watch for signs of bleeding. The doctor has checked you carefully, but problems can develop later. If you notice any problems or new symptoms, get medical treatment right away. Follow-up care is a key part of your treatment and safety. Be sure to make and go to all appointments, and call your doctor if you are having problems. It's also a good idea to know your test results and keep a list of the medicines you take. How can you care for yourself at home? Be careful with medicines and foods  · Don't start or stop taking any medicines, vitamins, or natural remedies unless you first talk to your doctor. · Keep the amount of vitamin K in your diet about the same from day to day. Do not suddenly eat a lot more or a lot less food that is rich in vitamin K than you usually do.  Vitamin K affects how warfarin works and how your blood clots. · Limit your use of alcohol. Avoid bleeding by preventing falls  · Wear slippers or shoes with nonskid soles. · Remove throw rugs and clutter. · Rearrange furniture and electrical cords to keep them out of walking paths. · Keep stairways, porches, and outside walkways well lit. Use night-lights in hallways and bathrooms. · Be extra careful when you work with sharp tools or knives. When should you call for help? Call 911 anytime you think you may need emergency care. For example, call if:    · You passed out (lost consciousness).     · You have signs of severe bleeding, such as:  ¨ A severe headache that is different from past headaches. ¨ Vomiting blood or what looks like coffee grounds. ¨ Passing maroon or very bloody stools.    Call your doctor now or seek immediate medical care if:    · You have unexpected bleeding, including:  ¨ Blood in stools or black stools that look like tar. ¨ Blood in your urine. ¨ Bruises or blood spots under the skin.     · You feel dizzy or lightheaded.    Watch closely for changes in your health, and be sure to contact your doctor if:    · You do not get better as expected. Where can you learn more? Go to http://julián-lyle.info/. Enter C178 in the search box to learn more about \"High INR Test Result: Care Instructions. \"  Current as of: December 6, 2017  Content Version: 11.7  © 5899-9905 [employer]. Care instructions adapted under license by [employer] (which disclaims liability or warranty for this information). If you have questions about a medical condition or this instruction, always ask your healthcare professional. [place] [address] any warranty or liability for your use of this information.

## 2018-09-08 NOTE — ED PROVIDER NOTES
HPI Comments: 52 y.o. male with past medical history significant for CAD, PE, HTN, BPH, dyslipidemia, lupus, DM type 2, neuropathy, periodontal disease, thoracic outlet syndrome, CKD, stroke, and MI who presents from home with chief complaint of gum bleeding. Pt reports constant gum bleeding for 1 day after he had his teeth cleaned. Pt also c/o an elevated INR of 6.0 after having his blood drawn 1 day ago. Pt notes he had an INR of 1.9 3 weeks ago. Pt reports he had an INR of 4 in the past. Pt c/o HA. Pt notes he did not take his medications today. Pt states he is currently taking warfarin in the morning. Pt reports he is \"drinking a lot of cranberry juice and eating a lot of grapes\". Pt states he was dx with PE in June. Pt denies bloody stool, hematemesis, CP, and SOB. There are no other acute medical concerns at this time. PCP: Bre Whittington MD 
 
Note written by Emily Elias, as dictated by Giovanna Bullock MD 9:05 AM  
 
 
The history is provided by the patient. Past Medical History:  
Diagnosis Date  BPH (benign prostatic hyperplasia)  CAD (coronary artery disease)   
 multivessel  CKD (chronic kidney disease), stage III   
 DM type 2 causing neurological disease ([address]  DM type 2 causing renal disease ([address]  DM type 2 causing vascular disease ([address]  Dyslipidemia, goal LDL below 70  Essential hypertension, benign  History of acute inferior wall MI 5/30/2017  Lupus  Neuropathy  Thoracic outlet syndrome Past Surgical History:  
Procedure Laterality Date  HX ORTHOPAEDIC  1996  
 L. knee arthroscopy Family History:  
Problem Relation Age of Onset  Heart Disease Mother  Stroke Mother  Diabetes Father  Stroke Father  Heart Disease Father Social History Social History  Marital status:  Spouse name: N/A  
 Number of children: N/A  
 Years of education: N/A Occupational History  Not on file. Social History Main Topics  Smoking status: Never Smoker  Smokeless tobacco: Never Used  Alcohol use Yes  Drug use: No  
 Sexual activity: Not on file Other Topics Concern  Not on file Social History Narrative ALLERGIES: Iodinated contrast- oral and iv dye Review of Systems Respiratory: Negative for shortness of breath. Cardiovascular: Negative for chest pain. Gastrointestinal: Negative for blood in stool and vomiting. Neurological: Positive for headaches. All other systems reviewed and are negative. Vitals:  
 09/08/18 8930 BP: 139/89 Pulse: 88 Resp: 18 Temp: 98.7 °F (37.1 °C) SpO2: 98% Weight: 99.8 kg (220 lb) Height: 6' (1.829 m) Physical Exam  
Constitutional: He is oriented to person, place, and time. He appears well-developed and well-nourished. No distress. HENT:  
Head: Normocephalic and atraumatic. Minimal old blood in gums. No active hemorrhage or bright red blood. Eyes: Conjunctivae are normal. No scleral icterus. Neck: Neck supple. No tracheal deviation present. Cardiovascular: Normal rate, regular rhythm, normal heart sounds and intact distal pulses. Exam reveals no gallop and no friction rub. No murmur heard. Pulmonary/Chest: Effort normal and breath sounds normal. He has no wheezes. He has no rales. Abdominal: Soft. He exhibits no distension. There is no tenderness. There is no rebound and no guarding. Musculoskeletal: He exhibits no edema. Neurological: He is alert and oriented to person, place, and time. Skin: Skin is warm and dry. No rash noted. Psychiatric: He has a normal mood and affect. Nursing note and vitals reviewed. Note written by Emily Dougherty, as dictated by Arslan Caputo MD 9:05 AM 
 
[facility] 
 
 
ED Course Procedures PROGRESS NOTE: 
10:23 AM 
Discussed treatment options - pt preferring to take low dose of Vit K orally here and hold warfarin until Monday when he has a scheduled appointment with his PCP and INR re-check. Fall prevention instructions discussed. RTER precautions provided. All questions answered.

## 2018-09-08 NOTE — ED TRIAGE NOTES
Pt states that he is on blood thinners and was diagnosed with PE June this year and is on coumadin. Pt states he had a teeth cleaning yesterday and states that his gums wont stop bleeding. Pt states he also happen to have his blood drawn yesterday and his INR is 6.0

## 2018-10-16 ENCOUNTER — CLINICAL SUPPORT (OUTPATIENT)
Dept: CARDIOLOGY CLINIC | Age: 53
End: 2018-10-16

## 2018-10-16 ENCOUNTER — OFFICE VISIT (OUTPATIENT)
Dept: CARDIOLOGY CLINIC | Age: 53
End: 2018-10-16

## 2018-10-16 VITALS
HEART RATE: 71 BPM | RESPIRATION RATE: 16 BRPM | BODY MASS INDEX: 29.12 KG/M2 | OXYGEN SATURATION: 98 % | SYSTOLIC BLOOD PRESSURE: 106 MMHG | DIASTOLIC BLOOD PRESSURE: 80 MMHG | HEIGHT: 72 IN | WEIGHT: 215 LBS

## 2018-10-16 DIAGNOSIS — Z86.73 H/O: STROKE: ICD-10-CM

## 2018-10-16 DIAGNOSIS — E78.5 DYSLIPIDEMIA: ICD-10-CM

## 2018-10-16 DIAGNOSIS — N18.30 CKD (CHRONIC KIDNEY DISEASE), STAGE III (HCC): ICD-10-CM

## 2018-10-16 DIAGNOSIS — I10 ESSENTIAL HYPERTENSION: ICD-10-CM

## 2018-10-16 DIAGNOSIS — I25.10 CAD IN NATIVE ARTERY: Primary | Chronic | ICD-10-CM

## 2018-10-16 DIAGNOSIS — I25.10 CAD IN NATIVE ARTERY: Chronic | ICD-10-CM

## 2018-10-16 PROBLEM — R09.02 HYPOXIA: Status: RESOLVED | Noted: 2018-06-02 | Resolved: 2018-10-16

## 2018-10-16 RX ORDER — CLONIDINE HYDROCHLORIDE 0.2 MG/1
0.4 TABLET ORAL 2 TIMES DAILY
COMMUNITY
Start: 2018-09-27 | End: 2021-12-15

## 2018-10-16 RX ORDER — RIVAROXABAN 20 MG/1
TABLET, FILM COATED ORAL
Refills: 0 | COMMUNITY
Start: 2018-10-11 | End: 2019-04-16

## 2018-10-16 RX ORDER — OLMESARTAN MEDOXOMIL 20 MG/1
20 TABLET ORAL DAILY
COMMUNITY
Start: 2018-08-30 | End: 2021-12-15

## 2018-10-16 NOTE — PROGRESS NOTES
Britta Barry     1965       Vipal K. Princess Blizzard MD, [facility] [place]  Date of Visit-10/16/2018   PCP is Gina Chong MD   [address] [place] [address] [facility] [place]  [facility]  HPI:  Britta Barry is a 52 y.o. male   Seen today with stress echo done  Previous hx of anterior MI in 2013 stroke in 2016.   --Kenzie  nuclear showed EF of 50% with partial reversibility in the anterior apical region. There was a fixed defect in the inferior wall  Was hospitalized in June with chest pain. Had a high probability scan for pulmonary embolism in the right upper lobe. He had a normal pump function and was discharged on Coumadin. It happened the day after dental cleaning of the gums and bleeding and he had to get a vitamin K shot. He has atypical pain, not like his previous. He has no palpitations, tachycardia. Some mild dizziness, some mild shortness of breath. He has had some leg pains with combinations of dizziness, crystals in his ear, labyrinthitis and low blood pressure at times. He lost 30 lbs, on Xarelto previously. Stress echo is done today and shows no ischemic changes. Impression/Plan:  1. CAD, MI with troponin of 51. Cath September, 2013 showed occluded RCA, filling by collaterals, and managed medically. Partial occlusion of LAD. Has done reasonably well with medical therapy. 2. Hypertension, at goal.  Has been hypotensive at times. 3. Dyslipidemia, on high potency statin. 4. CKD3, stable. 5. Cerebrovascular disease. Prior right basal ganglia infarct. 6. Recent pulmonary embolism, currently on Xarelto. 7. BP meds include Benicar, Catapres, Toprol, Chlorthalidone and Norvasc. Multiple meds. 8. Diabetic, on insulin. Assessment/Plan:     1. CAD in native artery    2. Essential hypertension    3. Dyslipidemia    4. H/O: stroke    5. CKD (chronic kidney disease), stage III ([address]    6.  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 Irma Ragsdale in 6 months. Key CAD CHF Meds             XARELTO 20 mg tab tablet  (Taking) TAKE 1 TABLET BY MOUTH EVERY DAY WITH FOOD    olmesartan (BENICAR) 20 mg tablet  (Taking) Take 20 mg by mouth daily. cloNIDine HCl (CATAPRES) 0.2 mg tablet  (Taking) Take 0.2 mg by mouth daily. metoprolol succinate (TOPROL-XL) 100 mg tablet  (Taking) Take 100 mg by mouth daily. chlorthalidone (HYGROTEN) 25 mg tablet  (Taking) Take  by mouth daily. amLODIPine (NORVASC) 10 mg tablet  (Taking) Take 1 Tab by mouth Daily (before dinner). atorvastatin (LIPITOR) 40 mg tablet  (Taking) Take 2 Tabs by mouth nightly. clopidogrel (PLAVIX) 75 mg tablet  (Taking) Take 1 Tab by mouth daily. Impression:   1. CAD in native artery    2. Essential hypertension    3. Dyslipidemia    4. H/O: stroke    5. CKD (chronic kidney disease), stage III ([address]    6. 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 LCA: Small LCX with one ALOM, mild irreg. Ramus proximal 70%  LAD proximal 50% then occluded mid-portion with long area of occlusion and with reconstitution mid-distal, appears to fill by transseptal collaterals. =attempted to cross the occlusion, multiple guidewires tries, unable to proceed beyond the site of occlusion. I suspect this is chronic, and apparently had good enough collateral flow to not cause patient exertional angina. [place] 12-29-16= Lexiscan fixed mid-basl inferior wall & apical defect with partial reversibility at anteroapical wall, EF 50%  CVA 5/2016 right basal ganglia by MRI     ROS-except as noted above. . A complete cardiac and respiratory are reviewed and negative except as above ; Resp-denies wheezing  or productive cough,. Const- No unusual weight loss or fever; Neuro-no recent seizure or CVA ; GI- No BRBPR, abdom pain, bloating ; - no  hematuria   see supplement sheet, initialed and to be scanned by staff  Past Medical History:   Diagnosis Date    BPH (benign prostatic hyperplasia)     CAD (coronary artery disease)     multivessel    CKD (chronic kidney disease), stage III ([address]     DM type 2 causing neurological disease ([address]     DM type 2 causing renal disease ([address]     DM type 2 causing vascular disease ([address]     Dyslipidemia, goal LDL below 70     Essential hypertension, benign     History of acute inferior wall MI 5/30/2017    Lupus     Neuropathy     Pulmonary embolus ([facility] 06/2018    Thoracic outlet syndrome       Social Hx= reports that he has never smoked. He has never used smokeless tobacco. He reports that he does not drink alcohol or use illicit drugs. Exam and Labs:    Visit Vitals    /80 (BP 1 Location: Left arm, BP Patient Position: Sitting)    Pulse 71    Resp 16    Ht 6' (1.829 m)    Wt 215 lb (97.5 kg)    SpO2 98%    BMI 29.16 kg/m2    @Constitutional:  NAD, comfortable  Head: NC,AT. Eyes: No scleral icterus. Neck:  Neck supple. No JVD present. Throat: moist mucous membranes. Chest: Effort normal & normal respiratory excursion . Neurological: alert, conversant and oriented . Skin: Skin is not cold. No obvious systemic rash noted. Not diaphoretic. No erythema. Psychiatric:  Grossly normal mood and affect. Behavior appears normal. Extremities:  no clubbing or cyanosis. Abdomen: non distended    Lungs:breath sounds normal. No stridor. distress, wheezes or  Rales. Heart:    normal rate, regular rhythm, normal S1, S2, no murmurs, rubs, clicks or gallops , PMI non displaced. Edema: Edema is none.   Lab Results   Component Value Date/Time    Cholesterol, total 142 05/06/2016 04:50 AM    HDL Cholesterol 27 05/06/2016 04:50 AM    LDL, calculated 79.6 05/06/2016 04:50 AM    Triglyceride 177 (H) 05/06/2016 04:50 AM    CHOL/HDL Ratio 5.3 (H) 05/06/2016 04:50 AM     Lab Results   Component Value Date/Time    Sodium 136 09/08/2018 09:12 AM    Potassium 3.5 09/08/2018 09:12 AM    Chloride 94 (L) 09/08/2018 09:12 AM    CO2 36 (H) 09/08/2018 09:12 AM    Anion gap 6 09/08/2018 09:12 AM    Glucose 368 (H) 09/08/2018 09:12 AM    BUN 25 (H) 09/08/2018 09:12 AM    Creatinine 1.25 09/08/2018 09:12 AM    BUN/Creatinine ratio 20 09/08/2018 09:12 AM    GFR est AA >60 09/08/2018 09:12 AM    GFR est non-AA 60 (L) 09/08/2018 09:12 AM    Calcium 9.6 09/08/2018 09:12 AM      Wt Readings from Last 3 Encounters:   10/16/18 215 lb (97.5 kg)   09/08/18 220 lb (99.8 kg)   06/02/18 224 lb (101.6 kg)      BP Readings from Last 3 Encounters:   10/16/18 106/80   09/08/18 123/83   06/04/18 145/89      Current Outpatient Prescriptions   Medication Sig    XARELTO 20 mg tab tablet TAKE 1 TABLET BY MOUTH EVERY DAY WITH FOOD    olmesartan (BENICAR) 20 mg tablet Take 20 mg by mouth daily.  cloNIDine HCl (CATAPRES) 0.2 mg tablet Take 0.2 mg by mouth daily.  metoprolol succinate (TOPROL-XL) 100 mg tablet Take 100 mg by mouth daily.  mirtazapine (REMERON) 15 mg tablet Take  by mouth nightly.  insulin lispro (HUMALOG U-100 INSULIN) 100 unit/mL injection by SubCUTAneous route four (4) times daily as needed (sliding scale).  chlorthalidone (HYGROTEN) 25 mg tablet Take  by mouth daily.  insulin lispro protamine/insulin lispro (HUMALOG MIX 75-25) 100 unit/mL (75-25) injection 35 Units by SubCUTAneous route two (2) times a day.  lisdexamfetamine (VYVANSE) 20 mg capsule Take 50 mg by mouth daily.  cyclobenzaprine (FLEXERIL) 10 mg tablet Take 10 mg by mouth as needed.  amLODIPine (NORVASC) 10 mg tablet Take 1 Tab by mouth Daily (before dinner).  (Patient taking differently: Take 15 mg by mouth Daily (before dinner). )    atorvastatin (LIPITOR) 40 mg tablet Take 2 Tabs by mouth nightly.  clopidogrel (PLAVIX) 75 mg tablet Take 1 Tab by mouth daily.  tamsulosin (FLOMAX) 0.4 mg capsule Take 0.4 mg by mouth every evening.  traZODone (DESYREL) 50 mg tablet Take 50 mg by mouth as needed. No current facility-administered medications for this visit. Impression see above.

## 2018-10-16 NOTE — MR AVS SNAPSHOT
[address] [facility] [address] [place] [address] 
[phone] Patient: Daron Meadows MRN: [id number] :1965 Visit Information Date & Time Provider Department Dept. Phone Encounter #  
 10/16/2018 11:40 AM Yonathan Webber MD [facility] Kathleen Pacheco [phone] [id number] Upcoming Health Maintenance Date Due Hepatitis C Screening 1965 FOOT EXAM Q1 1973 MICROALBUMIN Q1 1973 EYE EXAM RETINAL OR DILATED Q1 1973 Pneumococcal 19-64 Medium Risk (1 of 1 - PPSV23) 1982 Shingrix Vaccine Age 50> (1 of 2) 2013 FOBT Q 1 YEAR AGE 50-75 2013 HEMOGLOBIN A1C Q6M 2017 LIPID PANEL Q1 10/14/2017 Influenza Age 5 to Adult 2018 DTaP/Tdap/Td series (2 - Td) 2026 Allergies as of 10/16/2018  Review Complete On: 10/16/2018 By: Jessica Sykes Severity Noted Reaction Type Reactions Iodinated Contrast- Oral And Iv Dye High 2013   Intolerance Anaphylaxis Current Immunizations  Reviewed on 2016 Name Date Influenza Vaccine 10/1/2012 Pneumococcal Polysaccharide (PPSV-23)  Deferred (Patient Refused) Tdap 2016  6:09 PM  
  
 Not reviewed this visit Vitals BP Pulse Resp Height(growth percentile) Weight(growth percentile) SpO2  
 106/80 (BP 1 Location: Left arm, BP Patient Position: Sitting) 71 16 6' (1.829 m) 215 lb (97.5 kg) 98% BMI Smoking Status 29.16 kg/m2 Never Smoker Vitals History BMI and BSA Data Body Mass Index Body Surface Area  
 29.16 kg/m 2 2.23 m 2 Preferred Pharmacy Pharmacy Name Phone [facility] [address] [place] [address], [address] [place] [address] [phone] Your Updated Medication List  
  
   
This list is accurate as of 10/16/18 12:01 PM.  Always use your most recent med list. amLODIPine 10 mg tablet Commonly known as:  Misa Rising Take 1 Tab by mouth Daily (before dinner). atorvastatin 40 mg tablet Commonly known as:  LIPITOR Take 2 Tabs by mouth nightly. chlorthalidone 25 mg tablet Commonly known as:  Mitra Prost Take  by mouth daily. cloNIDine HCl 0.2 mg tablet Commonly known as:  CATAPRES Take 0.2 mg by mouth daily. clopidogrel 75 mg Tab Commonly known as:  PLAVIX Take 1 Tab by mouth daily. cyclobenzaprine 10 mg tablet Commonly known as:  FLEXERIL Take 10 mg by mouth as needed. HumaLOG Mix 75-25(U-100)Insuln 100 unit/mL (75-25) injection Generic drug:  insulin lispro protamine/insulin lispro 35 Units by SubCUTAneous route two (2) times a day. HumaLOG U-100 Insulin 100 unit/mL injection Generic drug:  insulin lispro  
by SubCUTAneous route four (4) times daily as needed (sliding scale). metoprolol succinate 100 mg tablet Commonly known as:  TOPROL-XL Take 100 mg by mouth daily. olmesartan 20 mg tablet Commonly known as:  [employer] Take 20 mg by mouth daily. REMERON 15 mg tablet Generic drug:  mirtazapine Take  by mouth nightly. tamsulosin 0.4 mg capsule Commonly known as:  FLOMAX Take 0.4 mg by mouth every evening. traZODone 50 mg tablet Commonly known as:  Frann Hesselbach Take 50 mg by mouth as needed. VYVANSE 20 mg capsule Generic drug:  lisdexamfetamine Take 50 mg by mouth daily. XARELTO 20 mg Tab tablet Generic drug:  rivaroxaban TAKE 1 TABLET BY MOUTH EVERY DAY WITH FOOD Patient Instructions Please follow up with Dr. Rachel Gonzáles in 6 months. Introducing [facility] & HEALTH SERVICES! Dear Austin Go: 
Thank you for requesting a [employer] account. Our records indicate that you already have an active [employer] account. You can access your account anytime at [email or web address] Did you know that you can access your hospital and ER discharge instructions at any time in Rockmelt? You can also review all of your test results from your hospital stay or ER visit. Additional Information If you have questions, please visit the Frequently Asked Questions section of the [employer] website at [email or web address] Remember, Rockmelt is NOT to be used for urgent needs. For medical emergencies, dial 911. Now available from your iPhone and Android! Please provide this summary of care documentation to your next provider. Your primary care clinician is listed as [place] [facility]. If you have any questions after today's visit, please call [phone].

## 2019-04-16 ENCOUNTER — OFFICE VISIT (OUTPATIENT)
Dept: CARDIOLOGY CLINIC | Age: 54
End: 2019-04-16

## 2019-04-16 VITALS
SYSTOLIC BLOOD PRESSURE: 110 MMHG | RESPIRATION RATE: 16 BRPM | HEIGHT: 72 IN | DIASTOLIC BLOOD PRESSURE: 60 MMHG | WEIGHT: 212 LBS | OXYGEN SATURATION: 96 % | HEART RATE: 78 BPM | BODY MASS INDEX: 28.71 KG/M2

## 2019-04-16 DIAGNOSIS — I10 ESSENTIAL HYPERTENSION: ICD-10-CM

## 2019-04-16 DIAGNOSIS — E78.00 HYPERCHOLESTEREMIA: ICD-10-CM

## 2019-04-16 DIAGNOSIS — I25.10 CAD IN NATIVE ARTERY: Primary | ICD-10-CM

## 2019-04-16 DIAGNOSIS — Z86.73 H/O: STROKE: ICD-10-CM

## 2019-04-16 DIAGNOSIS — I25.2 HISTORY OF ACUTE INFERIOR WALL MI: ICD-10-CM

## 2019-04-16 DIAGNOSIS — N18.30 CKD (CHRONIC KIDNEY DISEASE), STAGE III (HCC): ICD-10-CM

## 2019-04-16 DIAGNOSIS — I67.9 CEREBRAL VASCULAR DISEASE: ICD-10-CM

## 2019-04-16 RX ORDER — ACETAMINOPHEN 500 MG
1000 TABLET ORAL
COMMUNITY
End: 2020-11-19

## 2019-04-16 RX ORDER — ATORVASTATIN CALCIUM 80 MG/1
80 TABLET, FILM COATED ORAL EVERY EVENING
COMMUNITY

## 2019-04-16 RX ORDER — CHOLECALCIFEROL TAB 125 MCG (5000 UNIT) 125 MCG
TAB ORAL DAILY
COMMUNITY
End: 2020-11-19

## 2019-04-16 RX ORDER — FLUOXETINE 10 MG/1
10 TABLET ORAL DAILY
COMMUNITY

## 2019-04-16 NOTE — PROGRESS NOTES
Visit Vitals  /60 (BP 1 Location: Left arm, BP Patient Position: Sitting)   Pulse 78   Resp 16   Ht 6' (1.829 m)   Wt 212 lb (96.2 kg)   SpO2 96%   BMI 28.75 kg/m²     Verified patient with two types of identifiers. Verified pharmacy with patient. Verified medications with the patient.  Discontinued medications not current per Dr. Giselle Tompkins.

## 2019-04-16 NOTE — Clinical Note
4/19/19 Patient: Hailee Loja YOB: 1965 Date of Visit: 4/16/2019 Tabatha Eddy, [address] [facility] [id number] VIA Facsimile: [phone] Dear Tabatha Eddy MD, Thank you for referring Mr. Karyle Harrow to [address]  for evaluation. My notes for this consultation are attached. If you have questions, please do not hesitate to call me. I look forward to following your patient along with you.  
 
 
Sincerely, 
 
Ernesto Cruz MD

## 2019-11-14 ENCOUNTER — OFFICE VISIT (OUTPATIENT)
Dept: CARDIOLOGY CLINIC | Age: 54
End: 2019-11-14

## 2019-11-14 VITALS
BODY MASS INDEX: 29.12 KG/M2 | DIASTOLIC BLOOD PRESSURE: 91 MMHG | SYSTOLIC BLOOD PRESSURE: 158 MMHG | OXYGEN SATURATION: 99 % | HEART RATE: 101 BPM | RESPIRATION RATE: 16 BRPM | HEIGHT: 72 IN | WEIGHT: 215 LBS

## 2019-11-14 DIAGNOSIS — I67.9 CEREBRAL VASCULAR DISEASE: ICD-10-CM

## 2019-11-14 DIAGNOSIS — N18.30 CKD (CHRONIC KIDNEY DISEASE), STAGE III (HCC): ICD-10-CM

## 2019-11-14 DIAGNOSIS — I25.10 CAD IN NATIVE ARTERY: Primary | ICD-10-CM

## 2019-11-14 DIAGNOSIS — I10 ESSENTIAL HYPERTENSION: ICD-10-CM

## 2019-11-14 DIAGNOSIS — I25.2 HISTORY OF ACUTE INFERIOR WALL MI: ICD-10-CM

## 2019-11-14 DIAGNOSIS — E78.00 HYPERCHOLESTEREMIA: ICD-10-CM

## 2019-11-14 RX ORDER — ASPIRIN 81 MG/1
81 TABLET ORAL DAILY
COMMUNITY
Start: 2019-11-14

## 2019-11-14 NOTE — PROGRESS NOTES
Visit Vitals  BP (!) 158/91 (BP 1 Location: Right arm, BP Patient Position: Sitting)   Pulse (!) 101   Resp 16   Ht 6' (1.829 m)   Wt 215 lb (97.5 kg)   SpO2 99%   BMI 29.16 kg/m²

## 2019-11-14 NOTE — Clinical Note
11/14/19 Patient: Casey Angel YOB: 1965 Date of Visit: 11/14/2019 Saul Shea, [address] [place] [id number] VIA Facsimile: [phone] Dear Saul Shea MD, Thank you for referring Mr. Reta Bowie to [address] [place], [place] [address]  for evaluation. My notes for this consultation are attached. If you have questions, please do not hesitate to call me. I look forward to following your patient along with you.  
 
 
Sincerely, 
 
Aline Medeiros MD

## 2019-11-14 NOTE — PROGRESS NOTES
Casey Angel     1965       Kenroy Duffy MD, [facility] [place]  Date of Visit-11/14/2019   PCP is Esme Jasso MD   [facility] and [facility] [place]  [facility]  HPI:  Casey Angel is a 62 y.o. male   1 year f/u. Hx of anterior MI in 2013 and stroke in 2016. Samantha Payne nuclear EF 50% with partial reversible anterior apical, fixed defect in the inferior wall. PE June 2018. Stress echo October 16, 2018 unremarkable at 6 minutes. Got to 76% of predicted HR. Overall the pt states he is doing well. Pt states he has some SOB, but notes that it is because he is \"out of shape\". Pt states that he occasionally has sharp pain on the left side of his chest. Pt denies that the pain occurs with exertions or dizziness or sweating. Pt states that the chest pain lasts from 30 seconds to 5 minutes. Pt notes that he does have a pinched nerve that causes his left arm to occasionally go numb. Pt states he feels palpitations when he is stressed/nervous. Pt is not taking ASA. Pt notes that he is taking Xarelto every other day. Pt states that he used to get \"really bad\" nosebleeds. Pt states at home his BP is closer to normal.     Denies edema, syncope or shortness of breath at rest    EKG: ST at 101, RBBB, left axis with possible RVH    Assessment/Plan:     1. CAD in native artery  Mi in September of 2013. Troponin to 51. Had ALLAN to occluded RCA. Since then stress testing has shown inferior fixed defect. Stress echo 10/16/18 was without ischemia at a lower level of work load. Pain free. Continue statin, BB, and Plavix. When he stops Xarelto, we will go to ASA and continue DAPT indefinitely. Main risk factor is DM  - AMB POC EKG ROUTINE W/ 12 LEADS, INTER & REP    2. Essential hypertension  No obvious secondary HTN. Consider renal artery scan. Currently on ARB, BB, diuretic, CCB, and Clonidine.    BP Readings from Last 6 Encounters:   11/14/19 (!) 158/91   04/16/19 110/60   10/16/18 106/80 09/08/18 123/83   06/04/18 145/89   02/01/18 124/72       3. Hypercholesteremia  Lipids on high potency statin as appropriate for secondary prevention. Lab Results   Component Value Date/Time    LDL, calculated 79.6 05/06/2016 04:50 AM      4. CKD (chronic kidney disease), stage III ([facility]  Lab Results   Component Value Date/Time    Creatinine (POC) 1.2 05/05/2016 02:38 AM    Creatinine 1.25 09/08/2018 09:12 AM        5. Cerebral vascular disease  Stroke May 2016 with ischemia to the right basal ganglia. Has some residual left sided weakness. 6. History of acute inferior wall MI  In 2013    7. Type 2 DM with CKD and hypertension (HCC)  On insulin. Lab Results   Component Value Date/Time    Hemoglobin A1c 9.9 (H) 01/26/2016 04:40 AM    Hemoglobin A1c, External 7.5 10/14/2016      F/u in 6 months     Impression:   1. CAD in native artery    2. Essential hypertension    3. Hypercholesteremia    4. CKD (chronic kidney disease), stage III ([facility]    5. Cerebral vascular disease    6. History of acute inferior wall MI    7. Type 2 DM with CKD and hypertension (HCC)       Cardiac History:   MI Troponin 51   Cath 9/1/13 LVG: mild inferoapical hypokinesis, EF about 60%  RCA: occluded distally with faint filling of DV by LCA collaterals-- easily reopened, clean moderate size DV, stented with 3 x 22mm ALLAN -->0%. LCA: Small LCX with one ALOM, mild irreg. Ramus proximal 70%  LAD proximal 50% then occluded mid-portion with long area of occlusion and with reconstitution mid-distal, appears to fill by transseptal collaterals. =attempted to cross the occlusion, multiple guidewires tries, unable to proceed beyond the site of occlusion. I suspect this is chronic, and apparently had good enough collateral flow to not cause patient exertional angina.      [place] 12-29-16= Lexiscan fixed mid-basl inferior wall & apical defect with partial reversibility at anteroapical wall, EF 50%  CVA 5/2016 right basal ganglia by MRI ROS-except as noted above. . A complete cardiac and respiratory are reviewed and negative except as above ; Resp-denies wheezing  or productive cough,. Const- No unusual weight loss or fever; Neuro-no recent seizure or CVA ; GI- No BRBPR, abdom pain, bloating ; - no  hematuria   see supplement sheet, initialed and to be scanned by staff  Past Medical History:   Diagnosis Date    BPH (benign prostatic hyperplasia)     CAD (coronary artery disease)     multivessel    CKD (chronic kidney disease), stage III ([address]     DM type 2 causing neurological disease ([facility]     DM type 2 causing renal disease ([facility]     DM type 2 causing vascular disease ([facility]     Dyslipidemia, goal LDL below 70     Essential hypertension, benign     History of acute inferior wall MI 5/30/2017    Lupus ([facility]     Neuropathy     Pulmonary embolus ([facility] 06/2018    Thoracic outlet syndrome       Social Hx= reports that he has never smoked. He has never used smokeless tobacco. He reports that he does not drink alcohol or use drugs. Exam and Labs:  BP (!) 158/91 (BP 1 Location: Right arm, BP Patient Position: Sitting)   Pulse (!) 101   Resp 16   Ht 6' (1.829 m)   Wt 215 lb (97.5 kg)   SpO2 99%   BMI 29.16 kg/m² Constitutional:  NAD, comfortable  Head: NC,AT. Eyes: No scleral icterus. Neck:  Neck supple. No JVD present. Throat: moist mucous membranes. Chest: Effort normal & normal respiratory excursion . Neurological: alert, conversant and oriented . Skin: Skin is not cold. No obvious systemic rash noted. Not diaphoretic. No erythema. Psychiatric:  Grossly normal mood and affect. Behavior appears normal. Extremities:  no clubbing or cyanosis. Abdomen: non distended    Lungs:breath sounds normal. No stridor. distress, wheezes or  Rales. Heart: normal rate, regular rhythm, normal S1, S2, no murmurs, rubs, clicks or gallops , PMI non displaced. Edema: Edema is none.   Lab Results   Component Value Date/Time    Cholesterol, total 142 05/06/2016 04:50 AM    HDL Cholesterol 27 05/06/2016 04:50 AM    LDL, calculated 79.6 05/06/2016 04:50 AM    Triglyceride 177 (H) 05/06/2016 04:50 AM    CHOL/HDL Ratio 5.3 (H) 05/06/2016 04:50 AM     Lab Results   Component Value Date/Time    Sodium 136 09/08/2018 09:12 AM    Potassium 3.5 09/08/2018 09:12 AM    Chloride 94 (L) 09/08/2018 09:12 AM    CO2 36 (H) 09/08/2018 09:12 AM    Anion gap 6 09/08/2018 09:12 AM    Glucose 368 (H) 09/08/2018 09:12 AM    BUN 25 (H) 09/08/2018 09:12 AM    Creatinine 1.25 09/08/2018 09:12 AM    BUN/Creatinine ratio 20 09/08/2018 09:12 AM    GFR est AA >60 09/08/2018 09:12 AM    GFR est non-AA 60 (L) 09/08/2018 09:12 AM    Calcium 9.6 09/08/2018 09:12 AM      Wt Readings from Last 3 Encounters:   11/14/19 215 lb (97.5 kg)   04/16/19 212 lb (96.2 kg)   10/16/18 215 lb (97.5 kg)      BP Readings from Last 3 Encounters:   11/14/19 (!) 158/91   04/16/19 110/60   10/16/18 106/80      Current Outpatient Medications   Medication Sig    atorvastatin (LIPITOR) 80 mg tablet Take 80 mg by mouth daily.  lisdexamfetamine (VYVANSE) 30 mg capsule Take 30 mg by mouth as needed.  FLUoxetine (PROZAC) 10 mg tablet Take 10 mg by mouth daily.  acetaminophen (TYLENOL EXTRA STRENGTH) 500 mg tablet Take 1,000 mg by mouth every six (6) hours as needed for Pain.  cholecalciferol, VITAMIN D3, (VITAMIN D3) 5,000 unit tab tablet Take  by mouth daily.  olmesartan (BENICAR) 20 mg tablet Take 20 mg by mouth daily.  cloNIDine HCl (CATAPRES) 0.2 mg tablet Take 0.4 mg by mouth two (2) times a day.  metoprolol succinate (TOPROL-XL) 100 mg tablet Take 100 mg by mouth daily.  mirtazapine (REMERON) 15 mg tablet Take  by mouth nightly.  chlorthalidone (HYGROTEN) 25 mg tablet Take  by mouth daily.  insulin lispro protamine/insulin lispro (HUMALOG MIX 75-25) 100 unit/mL (75-25) injection 35 Units by SubCUTAneous route two (2) times a day.     cyclobenzaprine (FLEXERIL) 10 mg tablet Take 10 mg by mouth as needed.  amLODIPine (NORVASC) 10 mg tablet Take 1 Tab by mouth Daily (before dinner).  clopidogrel (PLAVIX) 75 mg tablet Take 1 Tab by mouth daily.  tamsulosin (FLOMAX) 0.4 mg capsule Take 0.4 mg by mouth every evening.  traZODone (DESYREL) 50 mg tablet Take 50 mg by mouth as needed. No current facility-administered medications for this visit. Impression see above.       Written by Keshia Omalley, as dictated by Nubia Paiz MD.

## 2019-11-14 NOTE — PATIENT INSTRUCTIONS
Please take your Brittney Pravin daily with food until you run out. After you run out please start baby Asprin 81mg daily and continue Plavix. We will see you back in 6 months. A referral has been sent to [facility].

## 2020-10-31 NOTE — ED TRIAGE NOTES
Pt. C/o BP elevated today with headache and pain to chest this am.  Called PCP and sent here for further. Hx MI and stroke. Poor

## 2020-11-19 ENCOUNTER — APPOINTMENT (OUTPATIENT)
Dept: VASCULAR SURGERY | Age: 55
DRG: 624 | End: 2020-11-19
Attending: EMERGENCY MEDICINE
Payer: COMMERCIAL

## 2020-11-19 ENCOUNTER — HOSPITAL ENCOUNTER (INPATIENT)
Dept: MRI IMAGING | Age: 55
Discharge: HOME | DRG: 624 | End: 2020-11-19
Attending: INTERNAL MEDICINE
Payer: COMMERCIAL

## 2020-11-19 ENCOUNTER — HOSPITAL ENCOUNTER (EMERGENCY)
Dept: GENERAL RADIOLOGY | Age: 55
Discharge: HOME | DRG: 624 | End: 2020-11-19
Attending: NURSE PRACTITIONER
Payer: COMMERCIAL

## 2020-11-19 ENCOUNTER — HOSPITAL ENCOUNTER (INPATIENT)
Age: 55
LOS: 3 days | Discharge: HOME | DRG: 624 | End: 2020-11-22
Attending: EMERGENCY MEDICINE | Admitting: INTERNAL MEDICINE
Payer: COMMERCIAL

## 2020-11-19 DIAGNOSIS — E11.49 DM TYPE 2 CAUSING NEUROLOGICAL DISEASE (HCC): ICD-10-CM

## 2020-11-19 DIAGNOSIS — R73.9 HYPERGLYCEMIA: ICD-10-CM

## 2020-11-19 DIAGNOSIS — L08.9 DIABETIC FOOT INFECTION (HCC): Primary | ICD-10-CM

## 2020-11-19 DIAGNOSIS — E11.628 DIABETIC FOOT INFECTION (HCC): Primary | ICD-10-CM

## 2020-11-19 DIAGNOSIS — G62.9 NEUROPATHY: ICD-10-CM

## 2020-11-19 DIAGNOSIS — M32.9 LUPUS (HCC): ICD-10-CM

## 2020-11-19 DIAGNOSIS — R00.0 TACHYCARDIA: ICD-10-CM

## 2020-11-19 PROBLEM — F41.8 DEPRESSION WITH ANXIETY: Status: ACTIVE | Noted: 2020-11-19

## 2020-11-19 LAB
ALBUMIN SERPL-MCNC: 3.8 G/DL (ref 3.5–5)
ALBUMIN/GLOB SERPL: 1 {RATIO} (ref 1.1–2.2)
ALP SERPL-CCNC: 118 U/L (ref 45–117)
ALT SERPL-CCNC: 26 U/L (ref 12–78)
ANION GAP SERPL CALC-SCNC: 5 MMOL/L (ref 5–15)
APTT PPP: 26.3 SEC (ref 22.1–31)
AST SERPL-CCNC: 19 U/L (ref 15–37)
BASOPHILS # BLD: 0 K/UL (ref 0–0.1)
BASOPHILS NFR BLD: 0 % (ref 0–1)
BILIRUB SERPL-MCNC: 1 MG/DL (ref 0.2–1)
BUN SERPL-MCNC: 17 MG/DL (ref 6–20)
BUN/CREAT SERPL: 15 (ref 12–20)
CALCIUM SERPL-MCNC: 9 MG/DL (ref 8.5–10.1)
CHLORIDE SERPL-SCNC: 101 MMOL/L (ref 97–108)
CO2 SERPL-SCNC: 29 MMOL/L (ref 21–32)
COMMENT, HOLDF: NORMAL
CREAT SERPL-MCNC: 1.13 MG/DL (ref 0.7–1.3)
CRP SERPL-MCNC: 8.72 MG/DL (ref 0–0.6)
DIFFERENTIAL METHOD BLD: ABNORMAL
EOSINOPHIL # BLD: 0.1 K/UL (ref 0–0.4)
EOSINOPHIL NFR BLD: 1 % (ref 0–7)
ERYTHROCYTE [DISTWIDTH] IN BLOOD BY AUTOMATED COUNT: 13.5 % (ref 11.5–14.5)
ERYTHROCYTE [SEDIMENTATION RATE] IN BLOOD: 18 MM/HR (ref 0–20)
GLOBULIN SER CALC-MCNC: 4 G/DL (ref 2–4)
GLUCOSE BLD STRIP.AUTO-MCNC: 121 MG/DL (ref 65–100)
GLUCOSE SERPL-MCNC: 311 MG/DL (ref 65–100)
HCT VFR BLD AUTO: 41.8 % (ref 36.6–50.3)
HGB BLD-MCNC: 14.4 G/DL (ref 12.1–17)
IMM GRANULOCYTES # BLD AUTO: 0 K/UL (ref 0–0.04)
IMM GRANULOCYTES NFR BLD AUTO: 0 % (ref 0–0.5)
INR PPP: 1.1 (ref 0.9–1.1)
LACTATE BLD-SCNC: 1.3 MMOL/L (ref 0.4–2)
LYMPHOCYTES # BLD: 1.1 K/UL (ref 0.8–3.5)
LYMPHOCYTES NFR BLD: 10 % (ref 12–49)
MCH RBC QN AUTO: 28.8 PG (ref 26–34)
MCHC RBC AUTO-ENTMCNC: 34.4 G/DL (ref 30–36.5)
MCV RBC AUTO: 83.6 FL (ref 80–99)
MONOCYTES # BLD: 0.7 K/UL (ref 0–1)
MONOCYTES NFR BLD: 6 % (ref 5–13)
NEUTS SEG # BLD: 9.2 K/UL (ref 1.8–8)
NEUTS SEG NFR BLD: 83 % (ref 32–75)
NRBC # BLD: 0 K/UL (ref 0–0.01)
NRBC BLD-RTO: 0 PER 100 WBC
PLATELET # BLD AUTO: 240 K/UL (ref 150–400)
PMV BLD AUTO: 9.4 FL (ref 8.9–12.9)
POTASSIUM SERPL-SCNC: 3.5 MMOL/L (ref 3.5–5.1)
PROT SERPL-MCNC: 7.8 G/DL (ref 6.4–8.2)
PROTHROMBIN TIME: 11.7 SEC (ref 9–11.1)
RBC # BLD AUTO: 5 M/UL (ref 4.1–5.7)
SAMPLES BEING HELD,HOLD: NORMAL
SERVICE CMNT-IMP: ABNORMAL
SODIUM SERPL-SCNC: 135 MMOL/L (ref 136–145)
THERAPEUTIC RANGE,PTTT: NORMAL SECS (ref 58–77)
TROPONIN I SERPL-MCNC: <0.05 NG/ML
WBC # BLD AUTO: 11.2 K/UL (ref 4.1–11.1)

## 2020-11-19 PROCEDURE — 86140 C-REACTIVE PROTEIN: CPT

## 2020-11-19 PROCEDURE — 84484 ASSAY OF TROPONIN QUANT: CPT

## 2020-11-19 PROCEDURE — 74011250637 HC RX REV CODE- 250/637: Performed by: INTERNAL MEDICINE

## 2020-11-19 PROCEDURE — A9575 INJ GADOTERATE MEGLUMI 0.1ML: HCPCS | Performed by: RADIOLOGY

## 2020-11-19 PROCEDURE — 36415 COLL VENOUS BLD VENIPUNCTURE: CPT

## 2020-11-19 PROCEDURE — 99285 EMERGENCY DEPT VISIT HI MDM: CPT

## 2020-11-19 PROCEDURE — 93971 EXTREMITY STUDY: CPT

## 2020-11-19 PROCEDURE — 74011250636 HC RX REV CODE- 250/636: Performed by: RADIOLOGY

## 2020-11-19 PROCEDURE — 74011250637 HC RX REV CODE- 250/637: Performed by: EMERGENCY MEDICINE

## 2020-11-19 PROCEDURE — 73660 X-RAY EXAM OF TOE(S): CPT

## 2020-11-19 PROCEDURE — 80053 COMPREHEN METABOLIC PANEL: CPT

## 2020-11-19 PROCEDURE — 85025 COMPLETE CBC W/AUTO DIFF WBC: CPT

## 2020-11-19 PROCEDURE — 96365 THER/PROPH/DIAG IV INF INIT: CPT

## 2020-11-19 PROCEDURE — 83605 ASSAY OF LACTIC ACID: CPT

## 2020-11-19 PROCEDURE — 96375 TX/PRO/DX INJ NEW DRUG ADDON: CPT

## 2020-11-19 PROCEDURE — 65270000029 HC RM PRIVATE

## 2020-11-19 PROCEDURE — 87040 BLOOD CULTURE FOR BACTERIA: CPT

## 2020-11-19 PROCEDURE — 74011000250 HC RX REV CODE- 250: Performed by: NURSE PRACTITIONER

## 2020-11-19 PROCEDURE — 71045 X-RAY EXAM CHEST 1 VIEW: CPT

## 2020-11-19 PROCEDURE — 74011250636 HC RX REV CODE- 250/636: Performed by: INTERNAL MEDICINE

## 2020-11-19 PROCEDURE — 85730 THROMBOPLASTIN TIME PARTIAL: CPT

## 2020-11-19 PROCEDURE — 82962 GLUCOSE BLOOD TEST: CPT

## 2020-11-19 PROCEDURE — 83036 HEMOGLOBIN GLYCOSYLATED A1C: CPT

## 2020-11-19 PROCEDURE — 85610 PROTHROMBIN TIME: CPT

## 2020-11-19 PROCEDURE — 85652 RBC SED RATE AUTOMATED: CPT

## 2020-11-19 PROCEDURE — 93005 ELECTROCARDIOGRAM TRACING: CPT

## 2020-11-19 PROCEDURE — 73720 MRI LWR EXTREMITY W/O&W/DYE: CPT

## 2020-11-19 PROCEDURE — 74011250636 HC RX REV CODE- 250/636: Performed by: NURSE PRACTITIONER

## 2020-11-19 RX ORDER — AMLODIPINE BESYLATE 5 MG/1
10 TABLET ORAL DAILY
Status: DISCONTINUED | OUTPATIENT
Start: 2020-11-19 | End: 2020-11-22 | Stop reason: HOSPADM

## 2020-11-19 RX ORDER — PROMETHAZINE HYDROCHLORIDE 25 MG/1
12.5 TABLET ORAL
Status: DISCONTINUED | OUTPATIENT
Start: 2020-11-19 | End: 2020-11-22 | Stop reason: HOSPADM

## 2020-11-19 RX ORDER — INSULIN GLARGINE 100 [IU]/ML
42 INJECTION, SOLUTION SUBCUTANEOUS
Status: DISCONTINUED | OUTPATIENT
Start: 2020-11-20 | End: 2020-11-21

## 2020-11-19 RX ORDER — CLONIDINE HYDROCHLORIDE 0.1 MG/1
0.4 TABLET ORAL 2 TIMES DAILY
Status: DISCONTINUED | OUTPATIENT
Start: 2020-11-19 | End: 2020-11-22 | Stop reason: HOSPADM

## 2020-11-19 RX ORDER — ATORVASTATIN CALCIUM 20 MG/1
80 TABLET, FILM COATED ORAL DAILY
Status: DISCONTINUED | OUTPATIENT
Start: 2020-11-19 | End: 2020-11-22 | Stop reason: HOSPADM

## 2020-11-19 RX ORDER — MIRTAZAPINE 15 MG/1
15 TABLET, FILM COATED ORAL
Status: DISCONTINUED | OUTPATIENT
Start: 2020-11-20 | End: 2020-11-22 | Stop reason: HOSPADM

## 2020-11-19 RX ORDER — VANCOMYCIN/0.9 % SOD CHLORIDE 1.5G/250ML
1500 PLASTIC BAG, INJECTION (ML) INTRAVENOUS EVERY 12 HOURS
Status: DISCONTINUED | OUTPATIENT
Start: 2020-11-20 | End: 2020-11-22 | Stop reason: HOSPADM

## 2020-11-19 RX ORDER — METRONIDAZOLE 500 MG/100ML
500 INJECTION, SOLUTION INTRAVENOUS EVERY 12 HOURS
Status: DISCONTINUED | OUTPATIENT
Start: 2020-11-19 | End: 2020-11-21

## 2020-11-19 RX ORDER — TAMSULOSIN HYDROCHLORIDE 0.4 MG/1
0.4 CAPSULE ORAL EVERY EVENING
Status: DISCONTINUED | OUTPATIENT
Start: 2020-11-20 | End: 2020-11-22 | Stop reason: HOSPADM

## 2020-11-19 RX ORDER — VANCOMYCIN/0.9 % SOD CHLORIDE 1.5G/250ML
1500 PLASTIC BAG, INJECTION (ML) INTRAVENOUS
Status: COMPLETED | OUTPATIENT
Start: 2020-11-19 | End: 2020-11-19

## 2020-11-19 RX ORDER — FLUOXETINE HYDROCHLORIDE 20 MG/5ML
10 LIQUID ORAL EVERY EVENING
Status: DISCONTINUED | OUTPATIENT
Start: 2020-11-19 | End: 2020-11-22 | Stop reason: HOSPADM

## 2020-11-19 RX ORDER — HYDRALAZINE HYDROCHLORIDE 20 MG/ML
10 INJECTION INTRAMUSCULAR; INTRAVENOUS ONCE
Status: ACTIVE | OUTPATIENT
Start: 2020-11-19 | End: 2020-11-20

## 2020-11-19 RX ORDER — ENOXAPARIN SODIUM 100 MG/ML
40 INJECTION SUBCUTANEOUS DAILY
Status: DISCONTINUED | OUTPATIENT
Start: 2020-11-20 | End: 2020-11-22 | Stop reason: HOSPADM

## 2020-11-19 RX ORDER — PREGABALIN 25 MG/1
50 CAPSULE ORAL 2 TIMES DAILY
Status: DISCONTINUED | OUTPATIENT
Start: 2020-11-20 | End: 2020-11-22 | Stop reason: HOSPADM

## 2020-11-19 RX ORDER — SODIUM CHLORIDE 0.9 % (FLUSH) 0.9 %
5-40 SYRINGE (ML) INJECTION AS NEEDED
Status: DISCONTINUED | OUTPATIENT
Start: 2020-11-19 | End: 2020-11-22 | Stop reason: HOSPADM

## 2020-11-19 RX ORDER — ACETAMINOPHEN 650 MG/1
650 SUPPOSITORY RECTAL
Status: DISCONTINUED | OUTPATIENT
Start: 2020-11-19 | End: 2020-11-22 | Stop reason: HOSPADM

## 2020-11-19 RX ORDER — GADOTERATE MEGLUMINE 376.9 MG/ML
20 INJECTION INTRAVENOUS
Status: COMPLETED | OUTPATIENT
Start: 2020-11-19 | End: 2020-11-19

## 2020-11-19 RX ORDER — ACETAMINOPHEN 325 MG/1
650 TABLET ORAL
Status: DISCONTINUED | OUTPATIENT
Start: 2020-11-19 | End: 2020-11-22 | Stop reason: HOSPADM

## 2020-11-19 RX ORDER — CHLORTHALIDONE 25 MG/1
25 TABLET ORAL DAILY
COMMUNITY
End: 2021-12-15

## 2020-11-19 RX ORDER — VALSARTAN 80 MG/1
40 TABLET ORAL DAILY
Status: DISCONTINUED | OUTPATIENT
Start: 2020-11-20 | End: 2020-11-22 | Stop reason: HOSPADM

## 2020-11-19 RX ORDER — ASPIRIN 81 MG/1
81 TABLET ORAL DAILY
Status: DISCONTINUED | OUTPATIENT
Start: 2020-11-19 | End: 2020-11-22 | Stop reason: HOSPADM

## 2020-11-19 RX ORDER — SODIUM CHLORIDE 0.9 % (FLUSH) 0.9 %
5-40 SYRINGE (ML) INJECTION EVERY 8 HOURS
Status: DISCONTINUED | OUTPATIENT
Start: 2020-11-19 | End: 2020-11-22 | Stop reason: HOSPADM

## 2020-11-19 RX ORDER — MAGNESIUM SULFATE 100 %
4 CRYSTALS MISCELLANEOUS AS NEEDED
Status: DISCONTINUED | OUTPATIENT
Start: 2020-11-19 | End: 2020-11-22 | Stop reason: HOSPADM

## 2020-11-19 RX ORDER — CHLORTHALIDONE 25 MG/1
25 TABLET ORAL DAILY
Status: DISCONTINUED | OUTPATIENT
Start: 2020-11-20 | End: 2020-11-22 | Stop reason: HOSPADM

## 2020-11-19 RX ORDER — CLOPIDOGREL BISULFATE 75 MG/1
75 TABLET ORAL DAILY
Status: DISCONTINUED | OUTPATIENT
Start: 2020-11-19 | End: 2020-11-22 | Stop reason: HOSPADM

## 2020-11-19 RX ORDER — POLYETHYLENE GLYCOL 3350 17 G/17G
17 POWDER, FOR SOLUTION ORAL DAILY PRN
Status: DISCONTINUED | OUTPATIENT
Start: 2020-11-19 | End: 2020-11-22 | Stop reason: HOSPADM

## 2020-11-19 RX ORDER — METOPROLOL SUCCINATE 25 MG/1
100 TABLET, EXTENDED RELEASE ORAL DAILY
Status: DISCONTINUED | OUTPATIENT
Start: 2020-11-19 | End: 2020-11-22 | Stop reason: HOSPADM

## 2020-11-19 RX ORDER — DEXTROSE 50 % IN WATER (D50W) INTRAVENOUS SYRINGE
12.5-25 AS NEEDED
Status: DISCONTINUED | OUTPATIENT
Start: 2020-11-19 | End: 2020-11-22 | Stop reason: HOSPADM

## 2020-11-19 RX ORDER — INSULIN LISPRO 100 [IU]/ML
INJECTION, SOLUTION INTRAVENOUS; SUBCUTANEOUS
Status: DISCONTINUED | OUTPATIENT
Start: 2020-11-19 | End: 2020-11-22 | Stop reason: HOSPADM

## 2020-11-19 RX ORDER — OXYCODONE AND ACETAMINOPHEN 5; 325 MG/1; MG/1
1 TABLET ORAL
Status: COMPLETED | OUTPATIENT
Start: 2020-11-19 | End: 2020-11-19

## 2020-11-19 RX ORDER — METFORMIN HYDROCHLORIDE 500 MG/1
1000 TABLET, FILM COATED, EXTENDED RELEASE ORAL 2 TIMES DAILY
COMMUNITY

## 2020-11-19 RX ORDER — AMLODIPINE BESYLATE 10 MG/1
10 TABLET ORAL EVERY EVENING
COMMUNITY

## 2020-11-19 RX ORDER — ONDANSETRON 2 MG/ML
4 INJECTION INTRAMUSCULAR; INTRAVENOUS
Status: DISCONTINUED | OUTPATIENT
Start: 2020-11-19 | End: 2020-11-22 | Stop reason: HOSPADM

## 2020-11-19 RX ORDER — PREGABALIN 50 MG/1
50 CAPSULE ORAL 2 TIMES DAILY
COMMUNITY
End: 2021-12-15

## 2020-11-19 RX ADMIN — ATORVASTATIN CALCIUM 80 MG: 20 TABLET, FILM COATED ORAL at 22:22

## 2020-11-19 RX ADMIN — CEFEPIME 2 G: 2 INJECTION, POWDER, FOR SOLUTION INTRAVENOUS at 17:16

## 2020-11-19 RX ADMIN — AMLODIPINE BESYLATE 10 MG: 5 TABLET ORAL at 22:22

## 2020-11-19 RX ADMIN — METOPROLOL SUCCINATE 100 MG: 25 TABLET, EXTENDED RELEASE ORAL at 22:21

## 2020-11-19 RX ADMIN — CLONIDINE HYDROCHLORIDE 0.4 MG: 0.1 TABLET ORAL at 22:21

## 2020-11-19 RX ADMIN — VANCOMYCIN HYDROCHLORIDE 1500 MG: 10 INJECTION, POWDER, LYOPHILIZED, FOR SOLUTION INTRAVENOUS at 17:44

## 2020-11-19 RX ADMIN — Medication 10 ML: at 22:26

## 2020-11-19 RX ADMIN — SODIUM CHLORIDE 1000 ML: 900 INJECTION, SOLUTION INTRAVENOUS at 17:14

## 2020-11-19 RX ADMIN — OXYCODONE HYDROCHLORIDE AND ACETAMINOPHEN 1 TABLET: 5; 325 TABLET ORAL at 17:12

## 2020-11-19 RX ADMIN — SODIUM CHLORIDE 1000 ML: 900 INJECTION, SOLUTION INTRAVENOUS at 17:15

## 2020-11-19 RX ADMIN — GADOTERATE MEGLUMINE 20 ML: 376.9 INJECTION INTRAVENOUS at 20:54

## 2020-11-19 RX ADMIN — Medication 81 MG: at 22:21

## 2020-11-19 RX ADMIN — CLOPIDOGREL BISULFATE 75 MG: 75 TABLET ORAL at 22:22

## 2020-11-19 RX ADMIN — METRONIDAZOLE 500 MG: 500 INJECTION, SOLUTION INTRAVENOUS at 22:22

## 2020-11-19 NOTE — ED TRIAGE NOTES
Patient arrives with c/o possible infection to left great toe. Patient is a diabetic and states he does have peripheral neuropathy, however can feel some.

## 2020-11-19 NOTE — ED PROVIDER NOTES
The history is provided by the patient and the spouse. No  was used. Toe Pain    This is a new problem. The current episode started more than 1 week ago. The problem occurs constantly. The problem has been gradually worsening. The pain is present in the left toes. The quality of the pain is described as aching. The pain is moderate. Associated symptoms include numbness and limited range of motion. Pertinent negatives include no stiffness, no itching, no back pain and no neck pain. The symptoms are aggravated by movement and palpation. He has tried nothing for the symptoms. The treatment provided no relief. There has been no history of extremity trauma.         Past Medical History:   Diagnosis Date    BPH (benign prostatic hyperplasia)     CAD (coronary artery disease)     multivessel    CKD (chronic kidney disease), stage III ([facility])     DM type 2 causing neurological disease ([address]     DM type 2 causing renal disease ([address]     DM type 2 causing vascular disease ([facility])     Dyslipidemia, goal LDL below 79     Essential hypertension, benign     History of acute inferior wall MI 5/30/2017    Lupus ([address]     Neuropathy     Pulmonary embolus ([facility] 06/2018    Thoracic outlet syndrome        Past Surgical History:   Procedure Laterality Date    HX ORTHOPAEDIC  1996    L. knee arthroscopy         Family History:   Problem Relation Age of Onset    Heart Disease Mother     Stroke Mother     Diabetes Father     Stroke Father     Heart Disease Father        Social History     Socioeconomic History    Marital status:      Spouse name: Not on file    Number of children: Not on file    Years of education: Not on file    Highest education level: Not on file   Occupational History    Not on file   Social Needs    Financial resource strain: Not on file    Food insecurity     Worry: Not on file     Inability: Not on file    Transportation needs     Medical: Not on file Non-medical: Not on file   Tobacco Use    Smoking status: Never Smoker    Smokeless tobacco: Never Used   Substance and Sexual Activity    Alcohol use: No    Drug use: No    Sexual activity: Not on file   Lifestyle    Physical activity     Days per week: Not on file     Minutes per session: Not on file    Stress: Not on file   Relationships    Social connections     Talks on phone: Not on file     Gets together: Not on file     Attends Quaker service: Not on file     Active member of club or organization: Not on file     Attends meetings of clubs or organizations: Not on file     Relationship status: Not on file    Intimate partner violence     Fear of current or ex partner: Not on file     Emotionally abused: Not on file     Physically abused: Not on file     Forced sexual activity: Not on file   Other Topics Concern    Not on file   Social History Narrative    Not on file         ALLERGIES: Iodinated contrast media    Review of Systems   Constitutional: Negative for activity change, chills and fever. HENT: Negative for nosebleeds, sore throat, trouble swallowing and voice change. Eyes: Negative for visual disturbance. Respiratory: Negative for shortness of breath. Cardiovascular: Negative for chest pain and palpitations. Gastrointestinal: Negative for abdominal pain, constipation, diarrhea and nausea. Genitourinary: Negative for difficulty urinating, dysuria, hematuria and urgency. Musculoskeletal: Positive for arthralgias and gait problem. Negative for back pain, neck pain, neck stiffness and stiffness. Skin: Positive for wound. Negative for color change and itching. Allergic/Immunologic: Negative for immunocompromised state. Neurological: Positive for numbness. Negative for dizziness, seizures, syncope, weakness, light-headedness and headaches. Psychiatric/Behavioral: Negative for behavioral problems, confusion, hallucinations, self-injury and suicidal ideas.        Vitals: 11/19/20 1604   BP: (!) 132/93   Pulse: (!) 146   Resp: 20   Temp: 99.9 °F (37.7 °C)   SpO2: 99%   Weight: 98.4 kg (217 lb)   Height: 5' 11\" (1.803 m)            Physical Exam  Vitals signs and nursing note reviewed. Constitutional:       General: He is not in acute distress. Appearance: He is well-developed. He is not diaphoretic. HENT:      Head: Normocephalic and atraumatic. Eyes:      Pupils: Pupils are equal, round, and reactive to light. Neck:      Musculoskeletal: Normal range of motion and neck supple. Cardiovascular:      Rate and Rhythm: Regular rhythm. Tachycardia present. Heart sounds: Normal heart sounds. No murmur. No friction rub. No gallop. Pulmonary:      Effort: Pulmonary effort is normal. No respiratory distress. Breath sounds: Normal breath sounds. No wheezing. Abdominal:      General: Bowel sounds are normal. There is no distension. Palpations: Abdomen is soft. Tenderness: There is no abdominal tenderness. There is no guarding or rebound. Musculoskeletal:        Feet:    Skin:     General: Skin is warm. Findings: No rash. Neurological:      Mental Status: He is alert and oriented to person, place, and time. Psychiatric:         Behavior: Behavior normal.         Thought Content: Thought content normal.         Judgment: Judgment normal.          MDM     This is a 49-year-old male with past medical history, review of systems, physical exam as above, presenting complaints of diabetic foot wound, worsening. Patient states plantar aspect left great toe, present for 3 to 4 weeks, with noted worsening pain, erythema and swelling over the last few days. Patient denies systemic symptoms including fever, nausea or vomiting. Denies previous exacerbations of the same, has not been evaluated by his primary care physician, has not yet seen podiatry.   Physical exam remarkable for well-appearing middle-aged male, in no acute distress, with swelling and erythema, tenderness with necrotic ulcer over the plantar aspect left great toe. Patient's noted to be tachycardic, in the 140s, normotensive, afebrile, satting well on room air. He endorses a history of DVT and PE, currently taking clopidogrel without other anticoagulants. Unclear whether vital sign abnormalities represent PE versus sepsis. He is noted to be afebrile, nontoxic-appearing. Plan to obtain septic work-up including imaging of the left great toe, Doppler study of the left leg where is he experiencing pain. Patient has an allergy to iodinated contrast that he describes as rash, likely elevated D-dimer, will order VQ scan. Disposition pending    Procedures    7:00 PM  LLE doppler negative for DVT, elevated inflamatory markers and WBC, remains tachycardic, plain films w/o bony changes. Given abnormal VS and risk factors, obvious signs of infection with hyperglycemia, will consult Hospitalist for admission. Perfect Serve Consult for Admission  7:01 PM    ED Room Number: ER10/10  Patient Name and age:  Chase Shipley 60 y.o.  male  Working Diagnosis:   1. Diabetic foot infection ([address]    2. Tachycardia    3.  Hyperglycemia        COVID-19 Suspicion:  no  Sepsis present:  no  Reassessment needed: no  Code Status:  Full Code  Readmission: no  Isolation Requirements:  no  Recommended Level of Care:  med/surg  Department:[facility] ED - [phone]  Other:  D/w Dr. Antonietta Bermudez

## 2020-11-20 LAB
ALBUMIN SERPL-MCNC: 2.9 G/DL (ref 3.5–5)
ALBUMIN/GLOB SERPL: 0.9 {RATIO} (ref 1.1–2.2)
ALP SERPL-CCNC: 88 U/L (ref 45–117)
ALT SERPL-CCNC: 20 U/L (ref 12–78)
ANION GAP SERPL CALC-SCNC: 3 MMOL/L (ref 5–15)
AST SERPL-CCNC: 15 U/L (ref 15–37)
ATRIAL RATE: 118 BPM
BASOPHILS # BLD: 0 K/UL (ref 0–0.1)
BASOPHILS NFR BLD: 0 % (ref 0–1)
BILIRUB SERPL-MCNC: 0.7 MG/DL (ref 0.2–1)
BUN SERPL-MCNC: 16 MG/DL (ref 6–20)
BUN/CREAT SERPL: 17 (ref 12–20)
CALCIUM SERPL-MCNC: 8 MG/DL (ref 8.5–10.1)
CALCULATED P AXIS, ECG09: 32 DEGREES
CALCULATED R AXIS, ECG10: -142 DEGREES
CALCULATED T AXIS, ECG11: 17 DEGREES
CHLORIDE SERPL-SCNC: 105 MMOL/L (ref 97–108)
CO2 SERPL-SCNC: 29 MMOL/L (ref 21–32)
CREAT SERPL-MCNC: 0.94 MG/DL (ref 0.7–1.3)
DIAGNOSIS, 93000: NORMAL
DIFFERENTIAL METHOD BLD: ABNORMAL
EOSINOPHIL # BLD: 0.2 K/UL (ref 0–0.4)
EOSINOPHIL NFR BLD: 2 % (ref 0–7)
ERYTHROCYTE [DISTWIDTH] IN BLOOD BY AUTOMATED COUNT: 13.6 % (ref 11.5–14.5)
EST. AVERAGE GLUCOSE BLD GHB EST-MCNC: 214 MG/DL
GLOBULIN SER CALC-MCNC: 3.2 G/DL (ref 2–4)
GLUCOSE BLD STRIP.AUTO-MCNC: 186 MG/DL (ref 65–100)
GLUCOSE BLD STRIP.AUTO-MCNC: 223 MG/DL (ref 65–100)
GLUCOSE BLD STRIP.AUTO-MCNC: 240 MG/DL (ref 65–100)
GLUCOSE BLD STRIP.AUTO-MCNC: 256 MG/DL (ref 65–100)
GLUCOSE SERPL-MCNC: 173 MG/DL (ref 65–100)
HBA1C MFR BLD: 9.1 % (ref 4–5.6)
HCT VFR BLD AUTO: 35.7 % (ref 36.6–50.3)
HGB BLD-MCNC: 12 G/DL (ref 12.1–17)
IMM GRANULOCYTES # BLD AUTO: 0.1 K/UL (ref 0–0.04)
IMM GRANULOCYTES NFR BLD AUTO: 1 % (ref 0–0.5)
LYMPHOCYTES # BLD: 1.2 K/UL (ref 0.8–3.5)
LYMPHOCYTES NFR BLD: 12 % (ref 12–49)
MAGNESIUM SERPL-MCNC: 2 MG/DL (ref 1.6–2.4)
MCH RBC QN AUTO: 29.1 PG (ref 26–34)
MCHC RBC AUTO-ENTMCNC: 33.6 G/DL (ref 30–36.5)
MCV RBC AUTO: 86.4 FL (ref 80–99)
MONOCYTES # BLD: 0.8 K/UL (ref 0–1)
MONOCYTES NFR BLD: 8 % (ref 5–13)
NEUTS SEG # BLD: 7.6 K/UL (ref 1.8–8)
NEUTS SEG NFR BLD: 77 % (ref 32–75)
NRBC # BLD: 0 K/UL (ref 0–0.01)
NRBC BLD-RTO: 0 PER 100 WBC
P-R INTERVAL, ECG05: 138 MS
PLATELET # BLD AUTO: 237 K/UL (ref 150–400)
PMV BLD AUTO: 9.2 FL (ref 8.9–12.9)
POTASSIUM SERPL-SCNC: 3.6 MMOL/L (ref 3.5–5.1)
PROT SERPL-MCNC: 6.1 G/DL (ref 6.4–8.2)
Q-T INTERVAL, ECG07: 372 MS
QRS DURATION, ECG06: 134 MS
QTC CALCULATION (BEZET), ECG08: 521 MS
RBC # BLD AUTO: 4.13 M/UL (ref 4.1–5.7)
SERVICE CMNT-IMP: ABNORMAL
SODIUM SERPL-SCNC: 137 MMOL/L (ref 136–145)
VENTRICULAR RATE, ECG03: 118 BPM
WBC # BLD AUTO: 9.9 K/UL (ref 4.1–11.1)

## 2020-11-20 PROCEDURE — 74011250637 HC RX REV CODE- 250/637: Performed by: INTERNAL MEDICINE

## 2020-11-20 PROCEDURE — 87077 CULTURE AEROBIC IDENTIFY: CPT

## 2020-11-20 PROCEDURE — 87186 SC STD MICRODIL/AGAR DIL: CPT

## 2020-11-20 PROCEDURE — 99223 1ST HOSP IP/OBS HIGH 75: CPT | Performed by: INTERNAL MEDICINE

## 2020-11-20 PROCEDURE — 83735 ASSAY OF MAGNESIUM: CPT

## 2020-11-20 PROCEDURE — 0JBR0ZZ EXCISION OF LEFT FOOT SUBCUTANEOUS TISSUE AND FASCIA, OPEN APPROACH: ICD-10-PCS | Performed by: PODIATRIST

## 2020-11-20 PROCEDURE — 74011250636 HC RX REV CODE- 250/636: Performed by: INTERNAL MEDICINE

## 2020-11-20 PROCEDURE — 74011250637 HC RX REV CODE- 250/637: Performed by: FAMILY MEDICINE

## 2020-11-20 PROCEDURE — 97161 PT EVAL LOW COMPLEX 20 MIN: CPT

## 2020-11-20 PROCEDURE — 80053 COMPREHEN METABOLIC PANEL: CPT

## 2020-11-20 PROCEDURE — 87205 SMEAR GRAM STAIN: CPT

## 2020-11-20 PROCEDURE — 74011000250 HC RX REV CODE- 250: Performed by: INTERNAL MEDICINE

## 2020-11-20 PROCEDURE — 97116 GAIT TRAINING THERAPY: CPT

## 2020-11-20 PROCEDURE — 97165 OT EVAL LOW COMPLEX 30 MIN: CPT

## 2020-11-20 PROCEDURE — 74011636637 HC RX REV CODE- 636/637: Performed by: INTERNAL MEDICINE

## 2020-11-20 PROCEDURE — 97535 SELF CARE MNGMENT TRAINING: CPT

## 2020-11-20 PROCEDURE — 65270000029 HC RM PRIVATE

## 2020-11-20 PROCEDURE — 87147 CULTURE TYPE IMMUNOLOGIC: CPT

## 2020-11-20 PROCEDURE — 36415 COLL VENOUS BLD VENIPUNCTURE: CPT

## 2020-11-20 PROCEDURE — 82962 GLUCOSE BLOOD TEST: CPT

## 2020-11-20 PROCEDURE — 2709999900 HC NON-CHARGEABLE SUPPLY

## 2020-11-20 PROCEDURE — 85025 COMPLETE CBC W/AUTO DIFF WBC: CPT

## 2020-11-20 RX ORDER — TRAZODONE HYDROCHLORIDE 50 MG/1
50 TABLET ORAL
Status: DISCONTINUED | OUTPATIENT
Start: 2020-11-20 | End: 2020-11-22 | Stop reason: HOSPADM

## 2020-11-20 RX ADMIN — VANCOMYCIN HYDROCHLORIDE 1500 MG: 10 INJECTION, POWDER, LYOPHILIZED, FOR SOLUTION INTRAVENOUS at 17:19

## 2020-11-20 RX ADMIN — METRONIDAZOLE 500 MG: 500 INJECTION, SOLUTION INTRAVENOUS at 20:41

## 2020-11-20 RX ADMIN — FLUOXETINE HYDROCHLORIDE 10 MG: 20 LIQUID ORAL at 00:56

## 2020-11-20 RX ADMIN — METRONIDAZOLE 500 MG: 500 INJECTION, SOLUTION INTRAVENOUS at 09:05

## 2020-11-20 RX ADMIN — INSULIN LISPRO 3 UNITS: 100 INJECTION, SOLUTION INTRAVENOUS; SUBCUTANEOUS at 17:11

## 2020-11-20 RX ADMIN — Medication 81 MG: at 21:40

## 2020-11-20 RX ADMIN — ATORVASTATIN CALCIUM 80 MG: 20 TABLET, FILM COATED ORAL at 21:40

## 2020-11-20 RX ADMIN — INSULIN GLARGINE 42 UNITS: 100 INJECTION, SOLUTION SUBCUTANEOUS at 17:11

## 2020-11-20 RX ADMIN — ACETAMINOPHEN 650 MG: 325 TABLET ORAL at 20:02

## 2020-11-20 RX ADMIN — CHLORTHALIDONE 25 MG: 25 TABLET ORAL at 09:05

## 2020-11-20 RX ADMIN — INSULIN LISPRO 3 UNITS: 100 INJECTION, SOLUTION INTRAVENOUS; SUBCUTANEOUS at 11:53

## 2020-11-20 RX ADMIN — TRAZODONE HYDROCHLORIDE 50 MG: 50 TABLET ORAL at 21:40

## 2020-11-20 RX ADMIN — INSULIN LISPRO 3 UNITS: 100 INJECTION, SOLUTION INTRAVENOUS; SUBCUTANEOUS at 21:40

## 2020-11-20 RX ADMIN — METOPROLOL SUCCINATE 100 MG: 25 TABLET, EXTENDED RELEASE ORAL at 21:39

## 2020-11-20 RX ADMIN — INSULIN LISPRO 2 UNITS: 100 INJECTION, SOLUTION INTRAVENOUS; SUBCUTANEOUS at 07:52

## 2020-11-20 RX ADMIN — CEFEPIME 2 G: 2 INJECTION, POWDER, FOR SOLUTION INTRAVENOUS at 16:21

## 2020-11-20 RX ADMIN — FLUOXETINE HYDROCHLORIDE 10 MG: 20 LIQUID ORAL at 17:10

## 2020-11-20 RX ADMIN — CEFEPIME 2 G: 2 INJECTION, POWDER, FOR SOLUTION INTRAVENOUS at 00:56

## 2020-11-20 RX ADMIN — Medication 10 ML: at 11:54

## 2020-11-20 RX ADMIN — ENOXAPARIN SODIUM 40 MG: 40 INJECTION SUBCUTANEOUS at 09:07

## 2020-11-20 RX ADMIN — MIRTAZAPINE 15 MG: 15 TABLET, FILM COATED ORAL at 21:39

## 2020-11-20 RX ADMIN — CEFEPIME 2 G: 2 INJECTION, POWDER, FOR SOLUTION INTRAVENOUS at 09:05

## 2020-11-20 RX ADMIN — CLONIDINE HYDROCHLORIDE 0.4 MG: 0.1 TABLET ORAL at 09:06

## 2020-11-20 RX ADMIN — PREGABALIN 50 MG: 25 CAPSULE ORAL at 09:06

## 2020-11-20 RX ADMIN — CLOPIDOGREL BISULFATE 75 MG: 75 TABLET ORAL at 21:40

## 2020-11-20 RX ADMIN — CLONIDINE HYDROCHLORIDE 0.4 MG: 0.1 TABLET ORAL at 21:39

## 2020-11-20 RX ADMIN — TAMSULOSIN HYDROCHLORIDE 0.4 MG: 0.4 CAPSULE ORAL at 17:10

## 2020-11-20 RX ADMIN — Medication 10 ML: at 06:10

## 2020-11-20 RX ADMIN — PREGABALIN 50 MG: 25 CAPSULE ORAL at 17:10

## 2020-11-20 RX ADMIN — Medication 10 ML: at 21:40

## 2020-11-20 RX ADMIN — VALSARTAN 40 MG: 80 TABLET, FILM COATED ORAL at 09:06

## 2020-11-20 RX ADMIN — AMLODIPINE BESYLATE 10 MG: 5 TABLET ORAL at 21:39

## 2020-11-20 RX ADMIN — VANCOMYCIN HYDROCHLORIDE 1500 MG: 10 INJECTION, POWDER, LYOPHILIZED, FOR SOLUTION INTRAVENOUS at 06:10

## 2020-11-20 NOTE — PROGRESS NOTES
Problem: Mobility Impaired (Adult and Pediatric)  Goal: *Acute Goals and Plan of Care (Insert Text)  Description: FUNCTIONAL STATUS PRIOR TO ADMISSION: Patient was independent and active without use of DME.    HOME SUPPORT PRIOR TO ADMISSION: The patient lived with wife but did not require assist.    Physical Therapy Goals  Initiated 11/20/2020  1. Patient will move from supine to sit and sit to supine  in bed with independence within 7 day(s). 2.  Patient will transfer from bed to chair and chair to bed with independence using the least restrictive device within 7 day(s). 3.  Patient will perform sit to stand with independence within 7 day(s). 4.  Patient will ambulate with modified independence for 250 feet with the least restrictive device within 7 day(s). 5.  Patient will ascend/descend 2 stairs with 1 handrail(s) with supervision/set-up within 7 day(s). Outcome: Progressing Towards Goal   PHYSICAL THERAPY EVALUATION  Patient: Saleem Cates (12 y.o. male)  Date: 11/20/2020  Primary Diagnosis: Type 2 diabetes mellitus with left diabetic foot infection ([facility] [E11.628, L08.9]        Precautions: L great toe wound       ASSESSMENT  Based on the objective data described below, the patient presents with admission due to cellulitis/infection plantar surface of L great toe. Pmhx includes DM, CVA with residual LUE/LE weakness in 2016, MI 2017 and R bronchial mass 2013. Pt demonstrating good functional use of LUE with some noted weakness and reduced coordination. He demonstrates Mod I wth with mobility. Noted foot drop with gait striking ball of L foot for 150' with SBA. No asst device needed, yet pt stating he has a CMAFO to assist with foot clearance of which he does not wear. Sensation is impaired in his feet due to DM. No surgical intervention at this time, yet pt stating MD informed of potential I&D of toe.   Will continue to follow for post op/procedure follow up and to progress gait/activity tolerance. Current Level of Function Impacting Discharge (mobility/balance): good/ good    Functional Outcome Measure: The patient scored 90/100 on the barthel outcome measure which is indicative of 1-19% functional impairment. Other factors to consider for discharge: per above     Patient will benefit from skilled therapy intervention to address the above noted impairments. PLAN :  Recommendations and Planned Interventions: bed mobility training, transfer training, gait training, therapeutic exercises, neuromuscular re-education, edema management/control, patient and family training/education, and therapeutic activities      Frequency/Duration: Patient will be followed by physical therapy:  3 times a week to address goals. Recommendation for discharge: (in order for the patient to meet his/her long term goals)  To be determined: This discharge recommendation:  A follow-up discussion with the attending provider and/or case management is planned    IF patient discharges home will need the following DME: to be determined (TBD)         SUBJECTIVE:   Patient stated I am good right now, but doc said gonna irrigate my toe.     OBJECTIVE DATA SUMMARY:   HISTORY:    Past Medical History:   Diagnosis Date    BPH (benign prostatic hyperplasia)     CAD (coronary artery disease)     multivessel    CKD (chronic kidney disease), stage III (HCC)     DM type 2 causing neurological disease (HCC)     DM type 2 causing renal disease (HCC)     DM type 2 causing vascular disease (HCC)     Dyslipidemia, goal LDL below 70     Essential hypertension, benign     History of acute inferior wall MI 5/30/2017    Lupus ([address]     Neuropathy     Pulmonary embolus ([facility] [address] [address] 06/2018    Thoracic outlet syndrome      Past Surgical History:   Procedure Laterality Date    HX ORTHOPAEDIC  1996    L. knee arthroscopy       Personal factors and/or comorbidities impacting plan of care: see above and below    [address] [facility] Environment: Private residence  # Steps to Enter: 2  Rails to [facility]: No  One/Two Story Residence: One story  Living Alone: No  Support Systems: Spouse/Significant Other/Partner  Patient Expects to be Discharged to[de-identified] Private residence  Current DME Used/Available at Home: Cane, straight  Tub or Shower Type: Tub/Shower combination    EXAMINATION/PRESENTATION/DECISION MAKING:   Critical Behavior:  Neurologic State: Alert  Orientation Level: Oriented X4  Cognition: Follows commands  Safety/Judgement: Insight into deficits, Awareness of environment  Hearing: Auditory  Auditory Impairment: None  Skin:  IV; wound L gr toe  Edema: L foot  Range Of Motion:  AROM: Within functional limits(slightly less in Lprox UE/LE from CVA )           PROM: Within functional limits           Strength:    Strength: Generally decreased, functional(LUE/LE proximally)                    Tone & Sensation:   Tone: Normal              Sensation: Impaired               Coordination:  Coordination: Generally decreased, functional  Vision:      Functional Mobility:  Bed Mobility:  Rolling: Independent  Supine to Sit: Independent  Sit to Supine: Independent  Scooting: Independent  Transfers:  Sit to Stand: Independent  Stand to Sit: Independent        Bed to Chair: Independent              Balance:   Sitting: Intact  Standing: Intact  Ambulation/Gait Training:  Distance (ft): 150 Feet (ft)  Assistive Device: Gait belt  Ambulation - Level of Assistance: Modified independent        Gait Abnormalities: Foot drop;Decreased step clearance        Base of Support: Narrowed     Speed/Izzy: Fluctuations  Step Length: Right lengthened;Left shortened                    Functional Measure:  Barthel Index:    Bathin  Bladder: 10  Bowels: 10  Groomin  Dressing: 10  Feeding: 10  Mobility: 10  Stairs: 10  Toilet Use: 10  Transfer (Bed to Chair and Back): 10  Total: 90/100       The Barthel ADL Index: Guidelines  1.  The index should be used as a record of what a patient does, not as a record of what a patient could do. 2. The main aim is to establish degree of independence from any help, physical or verbal, however minor and for whatever reason. 3. The need for supervision renders the patient not independent. 4. A patient's performance should be established using the best available evidence. Asking the patient, friends/relatives and nurses are the usual sources, but direct observation and common sense are also important. However direct testing is not needed. 5. Usually the patient's performance over the preceding 24-48 hours is important, but occasionally longer periods will be relevant. 6. Middle categories imply that the patient supplies over 50 per cent of the effort. 7. Use of aids to be independent is allowed. Seema Flores., Barthel, D.W. ([id number]). Functional evaluation: the Barthel Index. [address] [facility] (14)2. Lorre Gums claudette [place], J.J.M.F, Ardelle Sport., Dewanda Fill., [place], [address] [facility] (1999). Measuring the change indisability after inpatient rehabilitation; comparison of the responsiveness of the Barthel Index and Functional [place] Measure. Journal of Neurology, Neurosurgery, and Psychiatry, 66(4), 103-145. Grayson Estrada, N.J.A, Thania Hernandez,  ROBIN.J.M, & Derek Love, M.A. (2004.) Assessment of post-stroke quality of life in cost-effectiveness studies: The usefulness of the Barthel Index and the EuroQoL-5D.  Quality of Life Research, 15, 026-03           Physical Therapy Evaluation Charge Determination   History Examination Presentation Decision-Making   MED Complexity :  comorbidities / personal factors that will impact the outcome / POC LOW Complexity : 1-2 Standardized tests and measures addressing body structure, function, activity limitation and / or participation in recreation  LOW Complexity : Stable, uncomplicated  Other outcome measures barthel  LOW       Based on the above components, the patient evaluation is determined to be of the following complexity level: LOW     Pain Ratin/10    Activity Tolerance:   Good    After treatment patient left in no apparent distress:   Sitting in chair and Call bell within reach    COMMUNICATION/EDUCATION:   The patients plan of care was discussed with: Occupational therapist and Registered nurse. Fall prevention education was provided and the patient/caregiver indicated understanding., Patient/family have participated as able in goal setting and plan of care. , and Patient/family agree to work toward stated goals and plan of care.     Thank you for this referral.  Duke Ni, PT   Time Calculation: 17 mins

## 2020-11-20 NOTE — ED NOTES
TRANSFER - OUT REPORT:    Verbal report given to [facility] (name) on Saleem Cates  being transferred to 5th floor med/surg (unit) for routine progression of care       Report consisted of patients Situation, Background, Assessment and   Recommendations(SBAR). Information from the following report(s) SBAR, ED Summary, [facility] and Recent Results was reviewed with the receiving nurse. Lines:   Peripheral IV 11/19/20 Right Antecubital (Active)   Site Assessment Clean, dry, & intact 11/19/20 1655   Phlebitis Assessment 0 11/19/20 1655   Infiltration Assessment 0 11/19/20 1655   Dressing Type Tape;Transparent 11/19/20 1655   Hub Color/Line Status Pink;Flushed;Patent 11/19/20 1655   Action Taken Blood drawn 11/19/20 1655        Opportunity for questions and clarification was provided.       Patient transported with:   [employer]

## 2020-11-20 NOTE — CONSULTS
Infectious Disease Consult    Impression/Plan   · Left great toe cellulitis. MRI findings not felt to represent osteomyelitis per radiology report. Cellulitis is significantly improved on vancomycin and cefepime. I doubt this represents an anaerobic infection. Metronidazole will be stopped. A culture of the wound was taken by myself today. Await podiatry input. If cultures are unrevealing consider discharge on a course of cefdinir and doxycycline. The ID service will continue to follow   · Diabetes mellitus. According to the patient his A1c has been as high as 16 in the past.  Hemoglobin A1c pending. Endocrinology is following  · History of lupus. Per chart records he is not on treatment for this at this time  · Leukocytosis. Due to above. Resolved  · CKD. Monitor closely on antibiotics. Stable. Anti-infectives:   1. Vancomycin  2. Cefepime    Subjective:   Date of Consultation:  November 20, 2020  Date of Admission: 11/19/2020   Referring Physician:     Patient is a 60 y.o. male with a past medical history significant for diabetes mellitus with peripheral neuropathy, lupus, and hypertension who was admitted for evaluation of a left great toe infection. Patient states that he noticed an ulcer on the left great toe approximately a month ago. He has been treating this with isopropyl alcohol soaks and topical antibiotic ointment for the last several weeks. The wound was initially stable but over the last 3 or 4 days developed progressive erythema. He also noted the ulcer was deeper. Initial work-up in the emergency department raised concern for osteomyelitis however this was not confirmed by MRI. He has been started on vancomycin, cefepime, and metronidazole with significant improvement of the cellulitis.   The infectious diseases service has been asked to assist with antibiotic management    Patient Active Problem List   Diagnosis Code    Essential hypertension I10    BPH (benign prostatic hyperplasia) N40.0    Dyslipidemia E78.5    CVA (cerebral vascular accident) ([facility] I63.9    Neuropathy G62.9    DM type 2 causing vascular disease ([facility] [facility] E11.59    DM type 2 causing neurological disease ([facility] [facility] E11.49    Lupus ([facility] [facility] M32.9    Thoracic outlet syndrome G54.0    Left-sided weakness R53.1    Dysarthria R47.1    DM type 2 causing renal disease ([facility] [facility] E11.29    CKD (chronic kidney disease), stage III (HCC) N18.30    Hypokalemia due to loss of potassium E87.6    CAD in native artery I25.10    Cerebral vascular disease I67.9    History of acute inferior wall MI I25.2    Pulmonary embolism (HCC) I26.99    H/O: stroke Z86.73    Type 2 DM with CKD and hypertension (HCC) E11.22, I12.9    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    Type 2 diabetes mellitus with left diabetic foot infection (HCC) E11.628, L08.9    Depression with anxiety F41.8     Past Medical History:   Diagnosis Date    BPH (benign prostatic hyperplasia)     CAD (coronary artery disease)     multivessel    CKD [facility] disease), stage III ([facility])     DM type 2 causing neurological disease ([facility] [address] [facility]     DM type 2 causing renal disease ([facility] [address] [facility]     DM type 2 causing vascular disease (HCC)     Dyslipidemia, goal LDL below 79     Essential hypertension, benign     History of acute inferior wall MI 5/30/2017    Lupus ([facility] [address]     Neuropathy     Pulmonary embolus ([facility] [address] 06/2018    Thoracic outlet syndrome       Family History   Problem Relation Age of Onset    Heart Disease Mother     Stroke Mother     Diabetes Father     Stroke Father     Heart Disease Father       Social History     Tobacco Use    Smoking status: Never Smoker    Smokeless tobacco: Never Used   Substance Use Topics    Alcohol use: No     Past Surgical History:   Procedure Laterality Date    HX ORTHOPAEDIC  1996    L. knee arthroscopy      Prior to Admission medications    Medication Sig Start Date End Date Taking?  Authorizing Provider   amLODIPine (NORVASC) 10 mg tablet Take 10 mg by mouth daily. Yes Provider, Historical   chlorthalidone (HYGROTON) 25 mg tablet Take 25 mg by mouth daily. Yes Provider, Historical   pregabalin (Lyrica) 50 mg capsule Take 50 mg by mouth two (2) times a day. Yes Provider, Historical   metFORMIN (GLUMETZA ER) 500 mg TG24 24 hour tablet Take 1,000 mg by mouth two (2) times a day. Yes Provider, Historical   aspirin delayed-release 81 mg tablet Take 1 Tab by mouth daily. 11/14/19  Yes Erich George MD   atorvastatin (LIPITOR) 80 mg tablet Take 80 mg by mouth daily. Yes Provider, Historical   lisdexamfetamine (VYVANSE) 30 mg capsule Take 30 mg by mouth as needed. Yes Provider, Historical   FLUoxetine (PROZAC) 10 mg tablet Take 10 mg by mouth daily. Yes Provider, Historical   olmesartan (BENICAR) 20 mg tablet Take 20 mg by mouth daily. 8/30/18  Yes Provider, Historical   cloNIDine HCl (CATAPRES) 0.2 mg tablet Take 0.4 mg by mouth two (2) times a day. 9/27/18  Yes Provider, Historical   metoprolol succinate (TOPROL-XL) 100 mg tablet Take 100 mg by mouth daily. Yes Other, MD Emy   mirtazapine (REMERON) 15 mg tablet Take 15 mg by mouth nightly. Yes Other, MD Emy   insulin lispro protamine/insulin lispro (HUMALOG MIX 75-25) 100 unit/mL (75-25) injection 35-75 Units by SubCUTAneous route two (2) times a day. Based on BG   Yes Provider, Historical   cyclobenzaprine (FLEXERIL) 10 mg tablet Take 10 mg by mouth nightly. 11/30/16  Yes Provider, Historical   clopidogrel (PLAVIX) 75 mg tablet Take 1 Tab by mouth daily. 5/7/16  Yes Elizabeth Arroyo MD   tamsulosin (FLOMAX) 0.4 mg capsule Take 0.4 mg by mouth every evening. Yes Provider, Historical   traZODone (DESYREL) 50 mg tablet Take 50 mg by mouth as needed.    Yes Provider, Historical     Allergies   Allergen Reactions    Iodinated Contrast Media Anaphylaxis        Review of Systems:  A comprehensive review of systems was negative except for that written in the History of Present Illness. Objective:   Blood pressure 134/81, pulse 78, temperature 97.7 °F (36.5 °C), resp. rate 20, height 5' 11\" (1.803 m), weight 217 lb (98.4 kg), SpO2 96 %. Temp (24hrs), Av.4 °F (36.9 °C), Min:97.7 °F (36.5 °C), Max:99.9 °F (37.7 °C)       Exam:    General:  Alert, cooperative, well noursished, well developed   Eyes:  Sclera anicteric. Mouth/Throat: Mucous membranes moist   Neck: Supple   Lungs:   Clear to auscultation anteriorly   CV:   RRR   Abdomen:   Soft, non-tender, nondistended   Extremities:  No edema   Skin:  No rash. Ulcer on the distal plantar aspect of the great toe. Erythema extending to forefoot significantly improved today based on photograph from yesterday. No purulence or fluctuance noted   Lymph nodes:    Musculoskeletal: No swelling or deformity   Lines/Devices:   Peripheral line   Psych: Alert and oriented, normal mood affect given the setting       Data Review:   Recent Results (from the past 24 hour(s))   CBC WITH AUTOMATED DIFF    Collection Time: 20  4:49 PM   Result Value Ref Range    WBC 11.2 (H) 4.1 - 11.1 K/uL    RBC 5.00 4. 10 - 5.70 M/uL    HGB 14.4 12.1 - 17.0 g/dL    HCT 41.8 36.6 - 50.3 %    MCV 83.6 80.0 - 99.0 FL    MCH 28.8 26.0 - 34.0 PG    MCHC 34.4 30.0 - 36.5 g/dL    RDW 13.5 11.5 - 14.5 %    PLATELET 579 525 - 197 K/uL    MPV 9.4 8.9 - 12.9 FL    NRBC 0.0 0  WBC    ABSOLUTE NRBC 0.00 0.00 - 0.01 K/uL    NEUTROPHILS 83 (H) 32 - 75 %    LYMPHOCYTES 10 (L) 12 - 49 %    MONOCYTES 6 5 - 13 %    EOSINOPHILS 1 0 - 7 %    BASOPHILS 0 0 - 1 %    IMMATURE GRANULOCYTES 0 0.0 - 0.5 %    ABS. NEUTROPHILS 9.2 (H) 1.8 - 8.0 K/UL    ABS. LYMPHOCYTES 1.1 0.8 - 3.5 K/UL    ABS. MONOCYTES 0.7 0.0 - 1.0 K/UL    ABS. EOSINOPHILS 0.1 0.0 - 0.4 K/UL    ABS. BASOPHILS 0.0 0.0 - 0.1 K/UL    ABS. IMM.  GRANS. 0.0 0.00 - 0.04 K/UL    DF AUTOMATED     METABOLIC PANEL, COMPREHENSIVE    Collection Time: 20  4:49 PM   Result Value Ref Range    Sodium 135 (L) 136 - 145 mmol/L    Potassium 3.5 3.5 - 5.1 mmol/L    Chloride 101 97 - 108 mmol/L    CO2 29 21 - 32 mmol/L    Anion gap 5 5 - 15 mmol/L    Glucose 311 (H) 65 - 100 mg/dL    BUN 17 6 - 20 MG/DL    Creatinine 1.13 0.70 - 1.30 MG/DL    BUN/Creatinine ratio 15 12 - 20      GFR est AA >60 >60 ml/min/1.73m2    GFR est non-AA >60 >60 ml/min/1.73m2    Calcium 9.0 8.5 - 10.1 MG/DL    Bilirubin, total 1.0 0.2 - 1.0 MG/DL    ALT (SGPT) 26 12 - 78 U/L    AST (SGOT) 19 15 - 37 U/L    Alk. phosphatase 118 (H) 45 - 117 U/L    Protein, total 7.8 6.4 - 8.2 g/dL    Albumin 3.8 3.5 - 5.0 g/dL    Globulin 4.0 2.0 - 4.0 g/dL    A-G Ratio 1.0 (L) 1.1 - 2.2     SED RATE (ESR)    Collection Time: 11/19/20  4:49 PM   Result Value Ref Range    Sed rate, automated 18 0 - 20 mm/hr   C REACTIVE PROTEIN, QT    Collection Time: 11/19/20  4:49 PM   Result Value Ref Range    C-Reactive protein 8.72 (H) 0.00 - 0.60 mg/dL   SAMPLES BEING HELD    Collection Time: 11/19/20  4:49 PM   Result Value Ref Range    SAMPLES BEING HELD [email or web address]     COMMENT        Add-on orders for these samples will be processed based on acceptable specimen integrity and analyte stability, which may vary by analyte.    PROTHROMBIN TIME + INR    Collection Time: 11/19/20  4:49 PM   Result Value Ref Range    INR 1.1 0.9 - 1.1      Prothrombin time 11.7 (H) 9.0 - 11.1 sec   PTT    Collection Time: 11/19/20  4:49 PM   Result Value Ref Range    aPTT 26.3 22.1 - 31.0 sec    aPTT, therapeutic range     58.0 - 77.0 SECS   TROPONIN I    Collection Time: 11/19/20  4:49 PM   Result Value Ref Range    Troponin-I, Qt. <0.05 <0.05 ng/mL   CULTURE, BLOOD    Collection Time: 11/19/20  4:49 PM    Specimen: Blood   Result Value Ref Range    Special Requests: NO SPECIAL REQUESTS      Culture result: NO GROWTH AFTER 11 HOURS     POC LACTIC ACID    Collection Time: 11/19/20  4:52 PM   Result Value Ref Range    Lactic Acid (POC) 1.30 0.40 - 2.00 mmol/L   EKG, 12 LEAD, INITIAL Collection Time: 11/19/20  5:23 PM   Result Value Ref Range    Ventricular Rate 118 BPM    Atrial Rate 118 BPM    P-R Interval 138 ms    QRS Duration 134 ms    Q-T Interval 372 ms    QTC Calculation (Bezet) 521 ms    Calculated P Axis 32 degrees    Calculated R Axis -142 degrees    Calculated T Axis 17 degrees    Diagnosis       Sinus tachycardia  Right bundle branch block  Inferior infarct (cited on or before 19-NOV-2020)  Anterolateral infarct , age undetermined  Abnormal ECG  When compared with ECG of 02-JUN-2018 06:38,  Vent. rate has increased BY  56 BPM  Anterolateral infarct is now present     GLUCOSE, POC    Collection Time: 11/19/20  9:42 PM   Result Value Ref Range    Glucose (POC) 121 (H) 65 - 100 mg/dL    Performed by [id number], COMPREHENSIVE    Collection Time: 11/20/20  5:49 AM   Result Value Ref Range    Sodium 137 136 - 145 mmol/L    Potassium 3.6 3.5 - 5.1 mmol/L    Chloride 105 97 - 108 mmol/L    CO2 29 21 - 32 mmol/L    Anion gap 3 (L) 5 - 15 mmol/L    Glucose 173 (H) 65 - 100 mg/dL    BUN 16 6 - 20 MG/DL    Creatinine 0.94 0.70 - 1.30 MG/DL    BUN/Creatinine ratio 17 12 - 20      GFR est AA >60 >60 ml/min/1.73m2    GFR est non-AA >60 >60 ml/min/1.73m2    Calcium 8.0 (L) 8.5 - 10.1 MG/DL    Bilirubin, total 0.7 0.2 - 1.0 MG/DL    ALT (SGPT) 20 12 - 78 U/L    AST (SGOT) 15 15 - 37 U/L    Alk.  phosphatase 88 45 - 117 U/L    Protein, total 6.1 (L) 6.4 - 8.2 g/dL    Albumin 2.9 (L) 3.5 - 5.0 g/dL    Globulin 3.2 2.0 - 4.0 g/dL    A-G Ratio 0.9 (L) 1.1 - 2.2     MAGNESIUM    Collection Time: 11/20/20  5:49 AM   Result Value Ref Range    Magnesium 2.0 1.6 - 2.4 mg/dL   CBC WITH AUTOMATED DIFF    Collection Time: 11/20/20  5:49 AM   Result Value Ref Range    WBC 9.9 4.1 - 11.1 K/uL    RBC 4.13 4.10 - 5.70 M/uL    HGB 12.0 (L) 12.1 - 17.0 g/dL    HCT 35.7 (L) 36.6 - 50.3 %    MCV 86.4 80.0 - 99.0 FL    MCH 29.1 26.0 - 34.0 PG    MCHC 33.6 30.0 - 36.5 g/dL    RDW 13.6 11.5 - 14.5 % PLATELET 480 654 - 407 K/uL    MPV 9.2 8.9 - 12.9 FL    NRBC 0.0 0  WBC    ABSOLUTE NRBC 0.00 0.00 - 0.01 K/uL    NEUTROPHILS 77 (H) 32 - 75 %    LYMPHOCYTES 12 12 - 49 %    MONOCYTES 8 5 - 13 %    EOSINOPHILS 2 0 - 7 %    BASOPHILS 0 0 - 1 %    IMMATURE GRANULOCYTES 1 (H) 0.0 - 0.5 %    ABS. NEUTROPHILS 7.6 1.8 - 8.0 K/UL    ABS. LYMPHOCYTES 1.2 0.8 - 3.5 K/UL    ABS. MONOCYTES 0.8 0.0 - 1.0 K/UL    ABS. EOSINOPHILS 0.2 0.0 - 0.4 K/UL    ABS. BASOPHILS 0.0 0.0 - 0.1 K/UL    ABS. IMM.  GRANS. 0.1 (H) 0.00 - 0.04 K/UL    DF AUTOMATED     GLUCOSE, POC    Collection Time: 11/20/20  6:20 AM   Result Value Ref Range    Glucose (POC) 186 (H) 65 - 100 mg/dL    Performed by Honorio Fox         Microbiology:      Studies:      Signed By: Wellington Vargas DO     November 20, 2020

## 2020-11-20 NOTE — PROGRESS NOTES
Daily Progress Note: 11/20/2020  Maria D Prather MD    Assessment/Plan:   Type 2 diabetes mellitus with left diabetic foot infection ([facility] (11/19/2020) POA: Has obvious cellulitis and concerning for underlying osteomyelitis. - Blood cultures. - Start empiric IV Vancomycin, Cefepime and Metronidazole. - MRI favors cellulitis rather than osteo  - Consult ID for antibiotic assistance  - Consult Podiatry    DM type 2 causing vascular disease ([facility] / DM type 2 causing neurological disease ([facility] / DM type 2 causing renal disease (HCC) POA:   - check A1c.   - Resume home insulin or formulary equivalent, SSi per protocol. - DM diet      Essential hypertension (9/1/2013) POA: BP uncontrolled. - Amlodipine, Hygroton. Catapres, Metoprolol, Valsartan     CAD in native artery / Dyslipidemia (9/2/2013) POA:   - resume Asprin, Plavix, Metoprolol and Lipitor     Hypokalemia due to loss of potassium (5/5/2016) POA: due to diuretic use.    - Replete and follow BMP     Depression with anxiety (11/19/2020) POA:   - Remeron and Prozac     BPH (benign prostatic hyperplasia) (9/1/2013) POA:   - resume Flomax          Problem List:  Problem List as of 11/20/2020 Date Reviewed: 11/17/2019          Codes Class Noted - Resolved    CAD in native artery (Chronic) ICD-10-CM: I25.10  ICD-9-CM: 414.01  Unknown - Present        * (Principal) Type 2 diabetes mellitus with left diabetic foot infection ([facility] ICD-10-CM: E11.628, L08.9  ICD-9-CM: 250.80, 686.9  11/19/2020 - Present        Depression with anxiety ICD-10-CM: F41.8  ICD-9-CM: 300.4  11/19/2020 - Present        Pulmonary embolism ([facility] ICD-10-CM: I26.99  ICD-9-CM: 415.19  6/2/2018 - Present        H/O: stroke ICD-10-CM: Z86.73  ICD-9-CM: V12.54  6/2/2018 - Present        Type 2 DM with CKD and hypertension ([facility] ICD-10-CM: E11.22, I12.9  ICD-9-CM: 250.40, 403.90  6/2/2018 - Present        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 ICD-10-CM: F15.545  ICD-9-CM: 438.83 6/2/2018 - Present        Cerebral vascular disease ICD-10-CM: I67.9  ICD-9-CM: 437.9  5/30/2017 - Present        History of acute inferior wall MI ICD-10-CM: I25.2  ICD-9-CM: 467  5/30/2017 - Present        CVA (cerebral vascular accident) [facility]) ICD-10-CM: I63.9  ICD-9-CM: 434.91  5/5/2016 - Present        Neuropathy ICD-10-CM: G62.9  ICD-9-CM: 355.9  Unknown - Present        DM type 2 causing vascular disease (HCC) ICD-10-CM: E11.59  ICD-9-CM: 250.70, 443.81  Unknown - Present        DM type 2 causing neurological disease ([facility] ICD-10-CM: E11.49  ICD-9-CM: 250.60  Unknown - Present        Lupus ([facility] ICD-10-CM: M32.9  ICD-9-CM: 710.0  Unknown - Present        Thoracic outlet syndrome ICD-10-CM: G54.0  ICD-9-CM: 353.0  Unknown - Present        Left-sided weakness ICD-10-CM: R53.1  ICD-9-CM: 728.87  5/5/2016 - Present        Dysarthria ICD-10-CM: R47.1  ICD-9-CM: 784.51  5/5/2016 - Present        DM type 2 causing renal disease (HCC) ICD-10-CM: E11.29  ICD-9-CM: 250.40  Unknown - Present        CKD (chronic kidney disease), stage III ([facility] ICD-10-CM: N18.30  ICD-9-CM: 099. 3  Unknown - Present        Hypokalemia due to loss of potassium ICD-10-CM: E87.6  ICD-9-CM: 276.8  5/5/2016 - Present        Dyslipidemia ICD-10-CM: E78.5  ICD-9-CM: 272.4  9/2/2013 - Present    Overview Signed 9/2/2013 10:35 AM by Ruben ALVARADO     9/2/13 Lipid panel   HDL 25              Essential hypertension ICD-10-CM: I10  ICD-9-CM: 401.9  9/1/2013 - Present        BPH (benign prostatic hyperplasia) ICD-10-CM: N40.0  ICD-9-CM: 600.00  9/1/2013 - Present        RESOLVED: Hypoxia ICD-10-CM: R09.02  ICD-9-CM: 799.02  6/2/2018 - 10/16/2018        RESOLVED: Cellulitis of foot ICD-10-CM: V73.454  ICD-9-CM: 682.7  1/25/2016 - 5/5/2016        RESOLVED: Hyponatremia ICD-10-CM: E87.1  ICD-9-CM: 276.1  1/25/2016 - 5/5/2016        RESOLVED: Acute myocardial infarction of other inferior wall, initial episode of care ICD-10-CM: I21.19  ICD-9-CM: 410.41  9/1/2013 - 5/5/2016    Overview Addendum 9/3/2013  7:45 AM by Sruthi Virk S     Cardiac cath 9/1/13 Pressures: normal   LVG: mild inferoapical hypokinesis, EF about 60%, no MR   RCA: occluded distally with faint filling of DV by LCA collaterals   LCA: Small LCX with one ALOM, mild irreg. Ramus proximal 70%, LAD proximal 50% then occluded mid-portion with long area of occlusion and with reconstitution mid-distal, appears to fill by transseptal collaterals. IMP:   2V CAD with acute RCA occlusion --> easily reopened, clean moderate size DV, stented with 3 x 22mm ALLAN -->0%. Although the LAD is not the acute culprit vessel, I was concerned that there was potential for severe ischemia, I thought there may be a very faint antegrade channel, and this might be the best chance to recanalize and stent, vs waiting and doing so later. I attempted to cross the occlusion, multiple guidewires tries, unable to proceed beyond the site of occlusion. I suspect this is chronic, and apparently had good enough collateral flow to not cause patient exertional angina. Stable anatomy. TTE 9/2/13 LVEF 55-60%, mild LVH, mild LAE             RESOLVED: Hyperglycemia due to type 2 diabetes mellitus [facility]) ICD-10-CM: E11.65  ICD-9-CM: 250.00  9/1/2013 - 5/5/2016              HPI:    Mr. William Desouza is a 60 y.o. male who is being admitted for Type 2 diabetes mellitus with left diabetic foot infection. Mr. William Desouza has a Hx of DM II with neuropathy and has poor lower extremity sensation. He says he had a small wound on plantar surface of his great toe for about 3-4 weeks. In the past four days, he noted progressive redness, swelling and pain. No fever or chills. He presented to our Emergency Department today where a plain xray a first digit ulcer with no radiographic findings of osteomyelitis. He will be admitted for further management. (Dr Esau Back)    11/20: MRI favors cellulitis rather than Osteo.  Toe is a little better this am. Less erythematous. Little pain. Will ask podiatry and ID to see. He reports his last A1c was 11. Review of Systems:   A comprehensive review of systems was negative except for that written in the HPI. Objective:   Physical Exam:     Visit Vitals  /82 (BP 1 Location: Right arm, BP Patient Position: At rest)   Pulse 69   Temp 98.1 °F (36.7 °C)   Resp 20   Ht 5' 11\" (1.803 m)   Wt 217 lb (98.4 kg)   SpO2 95%   BMI 30.27 kg/m²      O2 Device: Room air    Temp (24hrs), Av.6 °F (37 °C), Min:97.7 °F (36.5 °C), Max:99.9 °F (37.7 °C)    No intake/output data recorded.  1901 -  0700  In: 2100 [I.V.:2100]  Out: -     General:  Alert, cooperative, no distress, appears stated age. Head:  Normocephalic, without obvious abnormality, atraumatic. Eyes:  Conjunctivae/corneas clear. PERRL, EOMs intact. Nose: Nares normal. Septum midline. Mucosa normal. No drainage or sinus tenderness. Throat: Lips, mucosa, and tongue normal. Teeth and gums normal.   Neck: Supple, symmetrical, trachea midline, no adenopathy, thyroid: no enlargement/tenderness/nodules, no carotid bruit and no JVD. Back:   Symmetric, no curvature. ROM normal. No CVA tenderness. Lungs:   Clear to auscultation bilaterally. Chest wall:  No tenderness or deformity. Heart:  Regular rate and rhythm, S1, S2 normal, no murmur, click, rub or gallop. Abdomen:   Soft, non-tender. Bowel sounds normal. No masses,  No organomegaly. Extremities: Left great toe erythematous and warm to touch, 1cm ulcer on the plantar surface of the great toe. No drainage noted. No calf tenderness or cords. Pulses: 2+ and symmetric all extremities. Skin: Skin color, texture, turgor normal. Anular erythematous rash left forearm   Neurologic: CNII-XII intact. Alert and oriented X 3. Fine motor of hands and fingers normal.   equal.  No cogwheeling or rigidity. Gait not tested at this time. Sensation grossly normal to touch. Gross motor of extremities normal.       Data Review:   IMPRESSION: MRI 11/19/20     1. Great toe soft tissue ulceration with underlying cellulitis. No drainable  abscess. Reactive bone marrow edema is favored over osteomyelitis in the distal  phalanx. 2. Small bursitis plantar to the hallux sesamoids. Recent Days:  Recent Labs     11/20/20  0549 11/19/20  1649   WBC 9.9 11.2*   HGB 12.0* 14.4   HCT 35.7* 41.8    240     Recent Labs     11/20/20  0549 11/19/20  1649    135*   K 3.6 3.5    101   CO2 29 29   * 311*   BUN 16 17   CREA 0.94 1.13   CA 8.0* 9.0   MG 2.0  --    ALB 2.9* 3.8   TBILI 0.7 1.0   ALT 20 26   INR  --  1.1     No results for input(s): PH, PCO2, PO2, HCO3, FIO2 in the last 72 hours. 24 Hour Results:  Recent Results (from the past 24 hour(s))   CBC WITH AUTOMATED DIFF    Collection Time: 11/19/20  4:49 PM   Result Value Ref Range    WBC 11.2 (H) 4.1 - 11.1 K/uL    RBC 5.00 4. 10 - 5.70 M/uL    HGB 14.4 12.1 - 17.0 g/dL    HCT 41.8 36.6 - 50.3 %    MCV 83.6 80.0 - 99.0 FL    MCH 28.8 26.0 - 34.0 PG    MCHC 34.4 30.0 - 36.5 g/dL    RDW 13.5 11.5 - 14.5 %    PLATELET 801 662 - 426 K/uL    MPV 9.4 8.9 - 12.9 FL    NRBC 0.0 0  WBC    ABSOLUTE NRBC 0.00 0.00 - 0.01 K/uL    NEUTROPHILS 83 (H) 32 - 75 %    LYMPHOCYTES 10 (L) 12 - 49 %    MONOCYTES 6 5 - 13 %    EOSINOPHILS 1 0 - 7 %    BASOPHILS 0 0 - 1 %    IMMATURE GRANULOCYTES 0 0.0 - 0.5 %    ABS. NEUTROPHILS 9.2 (H) 1.8 - 8.0 K/UL    ABS. LYMPHOCYTES 1.1 0.8 - 3.5 K/UL    ABS. MONOCYTES 0.7 0.0 - 1.0 K/UL    ABS. EOSINOPHILS 0.1 0.0 - 0.4 K/UL    ABS. BASOPHILS 0.0 0.0 - 0.1 K/UL    ABS. IMM.  GRANS. 0.0 0.00 - 0.04 K/UL    DF AUTOMATED     METABOLIC PANEL, COMPREHENSIVE    Collection Time: 11/19/20  4:49 PM   Result Value Ref Range    Sodium 135 (L) 136 - 145 mmol/L    Potassium 3.5 3.5 - 5.1 mmol/L    Chloride 101 97 - 108 mmol/L    CO2 29 21 - 32 mmol/L    Anion gap 5 5 - 15 mmol/L    Glucose 311 (H) 65 - 100 mg/dL    BUN 17 6 - 20 MG/DL    Creatinine 1.13 0.70 - 1.30 MG/DL    BUN/Creatinine ratio 15 12 - 20      GFR est AA >60 >60 ml/min/1.73m2    GFR est non-AA >60 >60 ml/min/1.73m2    Calcium 9.0 8.5 - 10.1 MG/DL    Bilirubin, total 1.0 0.2 - 1.0 MG/DL    ALT (SGPT) 26 12 - 78 U/L    AST (SGOT) 19 15 - 37 U/L    Alk. phosphatase 118 (H) 45 - 117 U/L    Protein, total 7.8 6.4 - 8.2 g/dL    Albumin 3.8 3.5 - 5.0 g/dL    Globulin 4.0 2.0 - 4.0 g/dL    A-G Ratio 1.0 (L) 1.1 - 2.2     SED RATE (ESR)    Collection Time: 11/19/20  4:49 PM   Result Value Ref Range    Sed rate, automated 18 0 - 20 mm/hr   C REACTIVE PROTEIN, QT    Collection Time: 11/19/20  4:49 PM   Result Value Ref Range    C-Reactive protein 8.72 (H) 0.00 - 0.60 mg/dL   SAMPLES BEING HELD    Collection Time: 11/19/20  4:49 PM   Result Value Ref Range    SAMPLES BEING HELD [email or web address]     COMMENT        Add-on orders for these samples will be processed based on acceptable specimen integrity and analyte stability, which may vary by analyte.    PROTHROMBIN TIME + INR    Collection Time: 11/19/20  4:49 PM   Result Value Ref Range    INR 1.1 0.9 - 1.1      Prothrombin time 11.7 (H) 9.0 - 11.1 sec   PTT    Collection Time: 11/19/20  4:49 PM   Result Value Ref Range    aPTT 26.3 22.1 - 31.0 sec    aPTT, therapeutic range     58.0 - 77.0 SECS   TROPONIN I    Collection Time: 11/19/20  4:49 PM   Result Value Ref Range    Troponin-I, Qt. <0.05 <0.05 ng/mL   POC LACTIC ACID    Collection Time: 11/19/20  4:52 PM   Result Value Ref Range    Lactic Acid (POC) 1.30 0.40 - 2.00 mmol/L   EKG, 12 LEAD, INITIAL    Collection Time: 11/19/20  5:23 PM   Result Value Ref Range    Ventricular Rate 118 BPM    Atrial Rate 118 BPM    P-R Interval 138 ms    QRS Duration 134 ms    Q-T Interval 372 ms    QTC Calculation (Bezet) 521 ms    Calculated P Axis 32 degrees    Calculated R Axis -142 degrees    Calculated T Axis 17 degrees    Diagnosis       Sinus tachycardia  Right bundle branch block  Inferior infarct (cited on or before 19-NOV-2020)  Anterolateral infarct , age undetermined  Abnormal ECG  When compared with ECG of 02-JUN-2018 06:38,  Vent. rate has increased BY  56 BPM  Anterolateral infarct is now present     GLUCOSE, POC    Collection Time: 11/19/20  9:42 PM   Result Value Ref Range    Glucose (POC) 121 (H) 65 - 100 mg/dL    Performed by [id number], COMPREHENSIVE    Collection Time: 11/20/20  5:49 AM   Result Value Ref Range    Sodium 137 136 - 145 mmol/L    Potassium 3.6 3.5 - 5.1 mmol/L    Chloride 105 97 - 108 mmol/L    CO2 29 21 - 32 mmol/L    Anion gap 3 (L) 5 - 15 mmol/L    Glucose 173 (H) 65 - 100 mg/dL    BUN 16 6 - 20 MG/DL    Creatinine 0.94 0.70 - 1.30 MG/DL    BUN/Creatinine ratio 17 12 - 20      GFR est AA >60 >60 ml/min/1.73m2    GFR est non-AA >60 >60 ml/min/1.73m2    Calcium 8.0 (L) 8.5 - 10.1 MG/DL    Bilirubin, total 0.7 0.2 - 1.0 MG/DL    ALT (SGPT) 20 12 - 78 U/L    AST (SGOT) 15 15 - 37 U/L    Alk. phosphatase 88 45 - 117 U/L    Protein, total 6.1 (L) 6.4 - 8.2 g/dL    Albumin 2.9 (L) 3.5 - 5.0 g/dL    Globulin 3.2 2.0 - 4.0 g/dL    A-G Ratio 0.9 (L) 1.1 - 2.2     MAGNESIUM    Collection Time: 11/20/20  5:49 AM   Result Value Ref Range    Magnesium 2.0 1.6 - 2.4 mg/dL   CBC WITH AUTOMATED DIFF    Collection Time: 11/20/20  5:49 AM   Result Value Ref Range    WBC 9.9 4.1 - 11.1 K/uL    RBC 4.13 4.10 - 5.70 M/uL    HGB 12.0 (L) 12.1 - 17.0 g/dL    HCT 35.7 (L) 36.6 - 50.3 %    MCV 86.4 80.0 - 99.0 FL    MCH 29.1 26.0 - 34.0 PG    MCHC 33.6 30.0 - 36.5 g/dL    RDW 13.6 11.5 - 14.5 %    PLATELET 539 158 - 062 K/uL    MPV 9.2 8.9 - 12.9 FL    NRBC 0.0 0  WBC    ABSOLUTE NRBC 0.00 0.00 - 0.01 K/uL    NEUTROPHILS 77 (H) 32 - 75 %    LYMPHOCYTES 12 12 - 49 %    MONOCYTES 8 5 - 13 %    EOSINOPHILS 2 0 - 7 %    BASOPHILS 0 0 - 1 %    IMMATURE GRANULOCYTES 1 (H) 0.0 - 0.5 %    ABS. NEUTROPHILS 7.6 1.8 - 8.0 K/UL    ABS.  LYMPHOCYTES 1.2 0.8 - 3.5 K/UL    ABS. MONOCYTES 0.8 0.0 - 1.0 K/UL    ABS. EOSINOPHILS 0.2 0.0 - 0.4 K/UL    ABS. BASOPHILS 0.0 0.0 - 0.1 K/UL    ABS. IMM.  GRANS. 0.1 (H) 0.00 - 0.04 K/UL    DF AUTOMATED     GLUCOSE, POC    Collection Time: 11/20/20  6:20 AM   Result Value Ref Range    Glucose (POC) 186 (H) 65 - 100 mg/dL    Performed by Sonia Ji        Medications reviewed  Current Facility-Administered Medications   Medication Dose Route Frequency    influenza vaccine 2020-21 (6 mos+)(PF) (FLUARIX/FLULAVAL/FLUZONE QUAD) injection 0.5 mL  0.5 mL IntraMUSCular PRIOR TO DISCHARGE    metroNIDAZOLE (FLAGYL) IVPB premix 500 mg  500 mg IntraVENous Q12H    sodium chloride (NS) flush 5-40 mL  5-40 mL IntraVENous Q8H    sodium chloride (NS) flush 5-40 mL  5-40 mL IntraVENous PRN    acetaminophen (TYLENOL) tablet 650 mg  650 mg Oral Q6H PRN    Or    acetaminophen (TYLENOL) suppository 650 mg  650 mg Rectal Q6H PRN    polyethylene glycol (MIRALAX) packet 17 g  17 g Oral DAILY PRN    promethazine (PHENERGAN) tablet 12.5 mg  12.5 mg Oral Q6H PRN    Or    ondansetron (ZOFRAN) injection 4 mg  4 mg IntraVENous Q6H PRN    enoxaparin (LOVENOX) injection 40 mg  40 mg SubCUTAneous DAILY    insulin lispro (HUMALOG) injection   SubCUTAneous AC&HS    glucose chewable tablet 16 g  4 Tab Oral PRN    dextrose (D50W) injection syrg 12.5-25 g  12.5-25 g IntraVENous PRN    glucagon (GLUCAGEN) injection 1 mg  1 mg IntraMUSCular PRN    hydrALAZINE (APRESOLINE) 20 mg/mL injection 10 mg  10 mg IntraVENous ONCE    cefepime (MAXIPIME) 2 g in sterile water (preservative free) 10 mL IV syringe  2 g IntraVENous Q8H    vancomycin (VANCOCIN) 1500 mg in  ml infusion  1,500 mg IntraVENous Q12H    amLODIPine (NORVASC) tablet 10 mg  10 mg Oral DAILY    aspirin delayed-release tablet 81 mg  81 mg Oral DAILY    atorvastatin (LIPITOR) tablet 80 mg  80 mg Oral DAILY    chlorthalidone (HYGROTON) tablet 25 mg  25 mg Oral DAILY    cloNIDine HCL (CATAPRES) tablet 0.4 mg  0.4 mg Oral BID    clopidogreL (PLAVIX) tablet 75 mg  75 mg Oral DAILY    metoprolol succinate (TOPROL-XL) XL tablet 100 mg  100 mg Oral DAILY    mirtazapine (REMERON) tablet 15 mg  15 mg Oral QHS    tamsulosin (FLOMAX) capsule 0.4 mg  0.4 mg Oral QPM    pregabalin (LYRICA) capsule 50 mg  50 mg Oral BID    valsartan (DIOVAN) tablet 40 mg  40 mg Oral DAILY    FLUoxetine (PROzac) 20 mg/5 mL (4 mg/mL) oral solution 10 mg  10 mg Oral QPM    insulin glargine (LANTUS) injection 42 Units  42 Units SubCUTAneous PCD       Care Plan discussed with: Patient/Family    Total time spent with patient and review of records: 30 minutes.     Linda Aldana MD

## 2020-11-20 NOTE — PROGRESS NOTES
Bedside and Verbal shift change report given to Chelsie Thibodeaux (oncoming nurse) by Regsi Fang (offgoing nurse). Report included the following information SBAR.

## 2020-11-20 NOTE — H&P
[facility]  [address] [place] [address]  [phone]    Admission History and Physical      NAME:       Mary Ocampo   :       1965   MRN:      [id number]     PCP:      Lars Reed MD     Date of service:   2020     Chief  Complaint: Left great toe infection    History Of Presenting Illness:       Mr. Joselyn Duarte is a 60 y.o. male who is being admitted for Type 2 diabetes mellitus with left diabetic foot infection. Mr. Joselyn Duarte has a Hx of DM II with neuropathy and has poor lower extremity sensation. He says he had a small wound on plantar surface of his great toe for about 3-4 weeks. In the past four days, he noted progressive redness, swelling and pain. No fever or chills. He presented to our Emergency Department today where a plain xray a first digit ulcer with no radiographic findings of osteomyelitis. He will be admitted for further management. Allergies   Allergen Reactions    Iodinated Contrast Media Anaphylaxis       Prior to Admission medications    Medication Sig Start Date End Date Taking? Authorizing Provider   aspirin delayed-release 81 mg tablet Take 1 Tab by mouth daily. 19   Kenroy Juarez MD   atorvastatin (LIPITOR) 80 mg tablet Take 80 mg by mouth daily. Provider, Historical   lisdexamfetamine (VYVANSE) 30 mg capsule Take 30 mg by mouth as needed. Provider, Historical   FLUoxetine (PROZAC) 10 mg tablet Take 10 mg by mouth daily. Provider, Historical   acetaminophen (TYLENOL EXTRA STRENGTH) 500 mg tablet Take 1,000 mg by mouth every six (6) hours as needed for Pain. Provider, Historical   cholecalciferol, VITAMIN D3, (VITAMIN D3) 5,000 unit tab tablet Take  by mouth daily. Provider, Historical   olmesartan (BENICAR) 20 mg tablet Take 20 mg by mouth daily.  18   Provider, Historical cloNIDine HCl (CATAPRES) 0.2 mg tablet Take 0.4 mg by mouth two (2) times a day. 9/27/18   Provider, Historical   metoprolol succinate (TOPROL-XL) 100 mg tablet Take 100 mg by mouth daily. Other, MD Emy   mirtazapine (REMERON) 15 mg tablet Take  by mouth nightly. Other, MD Emy   chlorthalidone (HYGROTEN) 25 mg tablet Take  by mouth daily. Provider, Historical   insulin lispro protamine/insulin lispro (HUMALOG MIX 75-25) 100 unit/mL (75-25) injection 35 Units by SubCUTAneous route two (2) times a day. Provider, Historical   cyclobenzaprine (FLEXERIL) 10 mg tablet Take 10 mg by mouth as needed. 11/30/16   Provider, Historical   amLODIPine (NORVASC) 10 mg tablet Take 1 Tab by mouth Daily (before dinner). 5/7/16   Elizabeth Arroyo MD   clopidogrel (PLAVIX) 75 mg tablet Take 1 Tab by mouth daily. 5/7/16   Elizabeth Arroyo MD   tamsulosin (FLOMAX) 0.4 mg capsule Take 0.4 mg by mouth every evening. Provider, Historical   traZODone (DESYREL) 50 mg tablet Take 50 mg by mouth as needed.     Provider, Historical       Past Medical History:   Diagnosis Date    BPH (benign prostatic hyperplasia)     CAD (coronary artery disease)     multivessel    CKD (chronic kidney disease), stage III (HCC)     DM type 2 causing neurological disease ([address]     DM [facility]     DM type 2 causing vascular disease ([address]     Dyslipidemia, goal LDL below 79     Essential hypertension, benign     History of acute inferior wall MI 5/30/2017    Lupus ([address]     Neuropathy     Pulmonary embolus ([address] 06/2018    Thoracic outlet syndrome         Past Surgical History:   Procedure Laterality Date    HX ORTHOPAEDIC  1996    L. knee arthroscopy       Social History     Tobacco Use    Smoking status: Never Smoker    Smokeless tobacco: Never Used   Substance Use Topics    Alcohol use: No        Family History   Problem Relation Age of Onset    Heart Disease Mother     Stroke Mother    [facility] Diabetes Father     Stroke Father     Heart Disease Father         Review of Systems:    Constitutional ROS: no fever, chills, rigors or night sweats  Respiratory ROS: no cough, sputum, hemoptysis, dyspnea or pleuritic pain. Cardiovascular ROS: no chest pain, palpitations, orthopnea, PND or syncope  Endocrine ROS: no polydispsia, polyuria, heat or cold intolerance or major weight change. Gastrointestinal ROS: no dysphagia, abdominal pain, nausea, vomiting or diarrhea    Genito-Urinary ROS: no dysuria, frequency, hematuria, retention or flank pain  Musculoskeletal ROS: no joint pain but left great toe swelling, pain and redness as noted above   Neurological ROS: no headache, confusion, focal weakness or any other neurological symptoms  Psychiatric ROS: no depression, anxiety, mood swings  Dermatological ROS: no rash, pruritis, or urticaria  Heme-Lymph ROS: no swollen glands, bleeding    Examination:    Constitutional:    Visit Vitals  BP (!) 171/96   Pulse (!) 106   Temp 99.9 °F (37.7 °C)   Resp 20   Ht 5' 11\" (1.803 m)   Wt 98.4 kg (217 lb)   SpO2 92%   BMI 30.27 kg/m²         General:  Weak and ill looking patient in no acute distress  Eyes: Pink conjunctivae, PERRLA with no discharge. Normal eye movements  Ear, Nose, Mouth & Throat: No ottorrhea, rhinorrhea, non tender sinuses, dry mucous membranes  Respiratory:  No accessory muscle use, clear breath sounds without crackles or wheezes  Cardiovascular:  No JVD or murmurs, regular and normal S1, S2 without thrills, bruits or peripheral edema. Capillary refil+, good distal pulses  GI & :  Soft abdomen, non-tender, non-distended, normoactive bowel sounds with no palpable organomegaly  Heme:  No cervical or axillary adenopathy. Musculoskeletal:  No cyanosis, clubbing, atrophy or deformities  Skin:  No rashes, bruising. Ulcer plantar surface left great toe, swollen, tender.  No discharge   Neurological: Awake and alert, speech is clear, CNs 2-12 are grossly intact and otherwise non focal  Psychiatric:  Has a good insight and is oriented x 3  ________________________________________________________________________    Data Review:    Labs:    Recent Labs     11/19/20  1649   WBC 11.2*   HGB 14.4   HCT 41.8        Recent Labs     11/19/20  1649   *   K 3.5      CO2 29   *   BUN 17   CREA 1.13   CA 9.0   ALB 3.8   ALT 26     No components found for: GLPOC  No results for input(s): PH, PCO2, PO2, HCO3, FIO2 in the last 72 hours. Recent Labs     11/19/20  1649   INR 1.1       Imaging Studies:  Xray foot - reviewed     I have also reviewed available old medical records. Assessment & Impression:     Mr. Farhana Lewis is a 60 y.o. male being evaluated for:     Principal Problem:    Type 2 diabetes mellitus with left diabetic foot infection ([address] (11/19/2020)    Active Problems:    CAD in native artery ()      Essential hypertension (9/1/2013)      BPH (benign prostatic hyperplasia) (9/1/2013)      Dyslipidemia (9/2/2013)      Overview: 9/2/13 Lipid panel   HDL 25       DM type 2 causing vascular disease ([address] ()      DM type 2 causing neurological disease ([address] ()      DM type 2 causing renal disease ([address] ()      Hypokalemia due to loss of potassium (5/5/2016)      Depression with anxiety (11/19/2020)         Plan of management:    Type 2 diabetes mellitus with left diabetic foot infection ([address] (11/19/2020) POA: Has obvious cellulitis and concerning for underlying osteomyelitis. Admit to hospital. Blood cultures. Start empiric IV Vancomycin, Cefepime and Metronidazole. Get MRi fore foot. May need a podiatry vs ID input    DM type 2 causing vascular disease ([address] / DM type 2 causing neurological disease ([address] / DM type 2 causing renal disease ([address] POA: check A1c. Resume home insulin or formulary equivalent, SSi per protocol. DM diet     Essential hypertension (9/1/2013) POA: BP uncontrolled. Verify home medications and resume.  Adjust as needed    CAD in native artery / Dyslipidemia (9/2/2013) POA: resume Asprin, Plavix, Metoprolol and Lipitor    Hypokalemia due to loss of potassium (5/5/2016) POA: due to diuretic use.  Replete and follow BMP    Depression with anxiety (11/19/2020) POA: resume home medications once verified     BPH (benign prostatic hyperplasia) (9/1/2013) POA: resume Flomax    Code Status:  Full    Surrogate decision maker: Family    Risk of deterioration: high      Total time spent for the care of the patient: [address] discussed with: Patient, Family, Nursing Staff and ED physician    Discussed:  Code Status, Care Plan and D/C Planning    Prophylaxis:  Lovenox    Probable Disposition:  Home w/Family           ___________________________________________________    Attending Physician: Keyonna Schmidt MD

## 2020-11-20 NOTE — PROGRESS NOTES
[facility]: MED RECONCILIATION    Comments/Recommendations:   Medication reconciliation completed with patient over the phone, patient had detailed medication list he read to pharmacist.  Patient has been having trouble with daytime sleepiness (feels it is a residual effect of one of his nighttime medications), so today patient took all of his evening medications this afternoon prior to hospital arrival. Patient did not take any morning medications today. Patient takes Vyvanse PRN, states he does not need while inpatient. Confirmed preferred pharmacy as [facility] on [address]. Medications added:     Lyrica  Metformin     Medications removed:    APAP  Vitamin D3    Medications adjusted:    Cyclobenzaprine 10mg QHS adjusted from PRN  Humalog mix 75/25: 35-75 units BID based on BG adjusted from 35 units BID     Information obtained from: patient, Rx query, [facility]    Allergies: Iodinated contrast media    Prior to Admission Medications:     Medication Documentation Review Audit       Reviewed by Sarah Whelan (Pharmacist) on 11/19/20 at 2013      Medication Sig Documenting Provider Last Dose Status Taking? amLODIPine (NORVASC) 10 mg tablet Take 10 mg by mouth daily. Provider, Historical 11/18/2020 Active Yes   aspirin delayed-release 81 mg tablet Take 1 Tab by mouth daily. Darlene Fontenot MD 11/18/2020 Active Yes   atorvastatin (LIPITOR) 80 mg tablet Take 80 mg by mouth daily. Provider, Historical 11/18/2020 Active Yes   chlorthalidone (HYGROTON) 25 mg tablet Take 25 mg by mouth daily. Provider, Historical 11/18/2020 Active Yes   cloNIDine HCl (CATAPRES) 0.2 mg tablet Take 0.4 mg by mouth two (2) times a day. Provider, Historical 11/19/2020 afternoon Active Yes           Med Note (SARAH FISHER   u Nov 19, 2020  8:11 PM)     clopidogrel (PLAVIX) 75 mg tablet Take 1 Tab by mouth daily.  Elizabeth Arroyo MD 11/18/2020 Active Yes   cyclobenzaprine (FLEXERIL) 10 mg tablet Take 10 mg by mouth nightly. Provider, Historical 11/19/2020 afternoon Active Yes           Med Note (Elvis Austin   u Feb 1, 2018  3:07 PM)     FLUoxetine (PROZAC) 10 mg tablet Take 10 mg by mouth daily. Provider, Historical 11/18/2020 Active Yes   insulin lispro protamine/insulin lispro (HUMALOG MIX 75-25) 100 unit/mL (75-25) injection 35-75 Units by SubCUTAneous route two (2) times a day. Based on BG Provider, Historical 11/18/2020 Active Yes   lisdexamfetamine (VYVANSE) 30 mg capsule Take 30 mg by mouth as needed. Provider, Historical  Active Yes   metFORMIN (GLUMETZA ER) 500 mg TG24 24 hour tablet Take 1,000 mg by mouth two (2) times a day. Provider, Historical 11/19/2020 afternoon Active Yes   metoprolol succinate (TOPROL-XL) 100 mg tablet Take 100 mg by mouth daily. Emy Hernandez MD 11/18/2020 Active Yes   mirtazapine (REMERON) 15 mg tablet Take 15 mg by mouth nightly. Emy Hernandez MD 11/19/2020 afternoon Active Yes   olmesartan (BENICAR) 20 mg tablet Take 20 mg by mouth daily. Provider, Historical 11/18/2020 Active Yes           Med Note (SARAH FISHER   luly Nov 19, 2020  8:12 PM)     pregabalin (Lyrica) 50 mg capsule Take 50 mg by mouth two (2) times a day. Provider, Historical 11/19/2020 afternoon Active Yes   tamsulosin (FLOMAX) 0.4 mg capsule Take 0.4 mg by mouth every evening. Provider, Historical 11/19/2020 afternoon Active Yes   traZODone (DESYREL) 50 mg tablet Take 50 mg by mouth as needed.  Provider, Historical  Active Yes                      Thank you,   Sarah Fisher, PharmD, BCPS   Contact: [phone]

## 2020-11-20 NOTE — CONSULTS
The [address] [place] [address] Podiatric Surgery  H & P Note    Date: November 20, 2020  Patient: Lisette Hernández  MR #: [id number]  [facility] #: [id number]  Attending/Admitting Physician: Dr Britta Teran MD    Reason for Consult:  Dr Britta Teran MD asked me to see Lisette Hernández for evaluation and treatment of left foot infection. History of Present Illness:  Patient is a 60 y.o. male admitted for left great to infection. Wound started 4 weeks prior. No prior podiatric care. Has dense neuropathy. Was previously referred to me but never made an appointment. ROS:   Constitutional: Negative for fever, chills, weight loss and malaise/fatigue. HENT: Negative for nosebleeds and congestion. Eyes: Negative for blurred vision and double vision. Respiratory: Negative for cough, shortness of breath and wheezing. Cardiovascular: Negative for chest pain, palpitations, claudication and positive for leg swelling. Gastrointestinal: Negative for heartburn, nausea, vomiting, abdominal pain and diarrhea. Genitourinary: Negative for dysuria and urgency. Musculoskeletal: Negative for myalgias and joint pain. Skin: Negative for itching and rash. Neurological: Negative for dizziness, focal weakness and headaches. Endo/Heme/Allergies: Negative for environmental allergies. Does not bruise/bleed easily. Psychiatric/Behavioral: Negative for depression and suicidal ideas. The patient is not nervous/anxious. Blood pressure 134/81, pulse 78, temperature 97.7 °F (36.5 °C), resp. rate 20, height 5' 11\" (1.803 m), weight 98.4 kg (217 lb), SpO2 96 %.     Intake/Output Summary (Last 24 hours) at 11/20/2020 1026  Last data filed at 11/19/2020 2322  Gross per 24 hour   Intake 2100 ml   Output    Net 2100 ml         Medical History:  Past Medical History:   Diagnosis Date    BPH (benign prostatic hyperplasia)     CAD (coronary artery disease)     multivessel    CKD (chronic [facility] disease), stage III ([facility]  [facility]     DM type 2 causing renal disease [facility]     DM type 2 causing vascular disease [facility]     Dyslipidemia, goal LDL below 70     Essential hypertension, benign     History of acute inferior wall MI 5/30/2017    Lupus [facility]     Neuropathy     Pulmonary embolus ([facility] 06/2018    Thoracic outlet syndrome      Past Surgical History:   Procedure Laterality Date    HX ORTHOPAEDIC  1996    L. knee arthroscopy     [unfilled]  Allergies   Allergen Reactions    Iodinated Contrast Media Anaphylaxis     Family History   Problem Relation Age of Onset    Heart Disease Mother     Stroke Mother     Diabetes Father     Stroke Father     Heart Disease Father      Social History     Tobacco Use   Smoking Status Never Smoker   Smokeless Tobacco Never Used     Social History     Substance and Sexual Activity   Drug Use No     Social History     Substance and Sexual Activity   Alcohol Use No       Labs:  Lab Results   Component Value Date/Time    WBC 9.9 11/20/2020 05:49 AM    HGB 12.0 (L) 11/20/2020 05:49 AM    HCT 35.7 (L) 11/20/2020 05:49 AM    MCV 86.4 11/20/2020 05:49 AM    PLATELET 916 45/16/0750 05:49 AM     Lab Results   Component Value Date/Time    Sodium 137 11/20/2020 05:49 AM    Potassium 3.6 11/20/2020 05:49 AM    Chloride 105 11/20/2020 05:49 AM    CO2 29 11/20/2020 05:49 AM    Phosphorus 3.4 05/06/2016 04:50 AM    BUN 16 11/20/2020 05:49 AM    Calcium 8.0 (L) 11/20/2020 05:49 AM      Lab Results   Component Value Date/Time    Glucose 173 (H) 11/20/2020 05:49 AM     Lab Results   Component Value Date/Time    INR 1.1 11/19/2020 04:49 PM    No components found for: PTT  Recent Labs     11/20/20  0549 11/19/20  1649   * 311*     Physical Exam:  General appearance: alert, cooperative, no distress, appears stated age    Lower Extremity Exam:  Vascular:    Dorsalis Pedis Pulse: present  Posterior Tibialis Pulse: present  Popliteal and Femoral Pulses: present  Skin Temp: warm to warm right and left  Extremity Edema:non pitting right and left  Varicosities: absent    Neurological:  Deep Tendon Reflexes of Achilles and Patellar: intact right and left  Epicritic and Protective Sensations: absent right and left  Deep Pain Response: absent right and left    Dermatological:  Toenails: mycotic nails 1-5 right and left foot  Ulceration: noted sub left hallux. Pre-debridement measures 1cm x 1cm x 0.5cm, post debridement 1.2cm x 1.2cm x 0.9cm  Fibrotic tissue and hyperkeratotic skin noted to wound. Necrotic fat present  No purulence    Rash: absent  Erythema: noted to left foot      Musculoskeletal:  Gait and Station: antalgic  Muscle Strength of Major Muscle Groups: 4/5 right and left lower extremities  Stability:diminished right and left  Range of Motion:decreased dorsiflexion ankle and first mtpj right and left  Limb Deformities: hammertoes lesser toes right and left. Hallux limitus right and left  Previous Amputations: none    Imaging Modalities:   IMPRESSION: First digit ulcer with no radiographic findings of osteomyelitis. MRI: IMPRESSION:      1. Great toe soft tissue ulceration with underlying cellulitis. No drainable  abscess. Reactive bone marrow edema is favored over osteomyelitis in the distal  phalanx. 2. Small bursitis plantar to the hallux sesamoids. Left lower extremity venous duplex negative for deep venous thrombosis or thrombophlebitis. Right common femoral vein is thrombus free. Vascular Studies:  Microbiological:  Special Requests:        Preliminary    NO SPECIAL REQUESTS    GRAM STAIN  2+ WBCS SEEN       Preliminary    GRAM STAIN        Preliminary    2+ GRAM NEGATIVE RODS    GRAM STAIN        Preliminary    FEW GRAM POSITIVE COCCI IN PAIRS    Culture result:  PENDING     Preliminary          Impression:  1. Cellulitis left foot  2. Ulceration with fat layer exposed left hallux  3.     DM with peripheral neuropathy    Plan:  - Evaluation and management of left foot cellulitis. Infectious diseases consult completed. Recommend 4 weeks of antibiotic therapy. Cultures pending. No surgical intervention at this time. Imaging negative for osteomyelitis. Will have close follow up in my office on Monday. Ordered darco shoes. Needs daily dressing changes with betadine soaked gauze and dry dressing. The nature of the finding, probable diagnosis and likely treatment was thoroughly discussed with the patient. The options, risks, complications, alternative treatment as well as some of the differential diagnosis was discussed. The patient was thoroughly informed and all questions were answered. the patient indicated understanding and satisfaction with the discussion.     - Procedure: Patient apprised of the procedure and informed consent obtained. Excisional debridement performed to the left foot into and through subcutaneous tissue via 15 blade with removal of devitalized tissue of said level. Patient tolerated well and post procedure dressing applied. Ok to discharge from my standpoint tomorrow once ID recommendations made. Thanks for calling. Evolver.  HARPER Amaral FACFAS FACCWS [facility]  Triple Board Certified Foot & Ankle Specialist  [phone] cell  11/20/2020  10:26 AM

## 2020-11-20 NOTE — PROGRESS NOTES
Bedside and Verbal shift change report given to Sabas Randolph (oncoming nurse) by Angel (offgoing nurse). Report included the following information SBAR and Kardex.

## 2020-11-20 NOTE — PROGRESS NOTES
Reason for Admission:   Type II diabetes with L foot infection                   RUR Score:     18%-low                Plan for utilizing home health: To be determined      PCP: First and Last name:  Daryl Cabral   Name of Practice:    Are you a current patient: Yes/No: yes   Approximate date of last visit: 9 months ago   Can you participate in a virtual visit with your PCP: yes                    Current Advanced Directive/Advance Care Plan: yes but not on file                         Transition of Care Plan:     RUR-18%  1. PT/OT consult  2. Podiatry and ID consults  3. Pt's wife will transport him home at d/c  4. Medications from [facility]    5. CM following for any needs prior to d/c  L. Princess Mitul RN    Care Management Interventions  PCP Verified by CM: Yes  Mode of Transport at Discharge:  Other (see comment)(wife)  Physical Therapy Consult: Yes  Occupational Therapy Consult: Yes  Current Support Network: Lives with Spouse, Own Home(1 story with 2 entry steps)  Confirm Follow Up Transport: Family  Discharge Location  Discharge Placement: Home with family assistance

## 2020-11-20 NOTE — CONSULTS
We are consulted for assistance in management of this 49-year-old male with a history of type 2 diabetes mellitus. He is admitted for a diabetic foot infection with possible osteomyelitis. He has not had an A1c since 2016 which was 9.9%. He is currently on 75/25 insulin 35 units twice daily and his blood sugar on admission is 311. Based on his blood sugars today and his clinical condition, I would recommend the addition of basal insulin during the hospitalization. You can safely give 0.2 units/kg (20 units) daily of glargine insulin. We continue to follow.

## 2020-11-20 NOTE — PROGRESS NOTES
[facility] Dosing Services: Antimicrobial Stewardship Progress Note    Consult for antibiotic dosing of vancomycin by Dr. Reuben Cabrales  Pharmacist reviewed antibiotic appropriateness for 60year old , male  for indication of SSTI  toe(xray negative for osteo, MRI ordered)  Day of Therapy 1    Plan:  Vancomycin therapy:  Start Vancomycin therapy, with dose of 1500mg (15m/kg). Follow with maintenance dose of 1500mg every 12 hours. Dose calculated to approximate a therapeutic trough of 10-15mcg/mL. , adjust trough goal based on MRI findings if needed. Last trough level / Plan for level: Trough prior to 4th dose (not yet ordered). Pharmacy to follow daily and will make changes to dose and/or frequency based on clinical status. Date Dose & Interval Measured (mcg/mL) Extrapolated (mcg/mL)   ? ? ? ?   ? ? ? ?   ? ? ? ? Non-Kinetic Antimicrobial Dosing:   Current Regimen:  cefepime 2g IV every 12 hours  Recommendation: cefepime 2g IV every 8 hours for CrCl >60mL/min  Other Antimicrobial  (not dosed by pharmacist)   Metronidazole 500mg IV every hours   Cultures     none   Serum Creatinine     Lab Results   Component Value Date/Time    Creatinine 1.13 11/19/2020 04:49 PM    Creatinine (POC) 1.2 05/05/2016 02:38 AM       Creatinine Clearance Estimated Creatinine Clearance: 86.2 mL/min (based on SCr of 1.13 mg/dL).      Procalcitonin  No results found for: PCT     Temp   99.9 °F (37.7 °C)    WBC   Lab Results   Component Value Date/Time    WBC 11.2 (H) 11/19/2020 04:49 PM       H/H   Lab Results   Component Value Date/Time    HGB 14.4 11/19/2020 04:49 PM      Platelets Lab Results   Component Value Date/Time    PLATELET 375 88/52/2695 04:49 PM            Pharmacist: Signed Sarah Valdez Contact information: [id number]

## 2020-11-21 LAB
ALBUMIN SERPL-MCNC: 3 G/DL (ref 3.5–5)
ALBUMIN/GLOB SERPL: 0.9 {RATIO} (ref 1.1–2.2)
ALP SERPL-CCNC: 91 U/L (ref 45–117)
ALT SERPL-CCNC: 19 U/L (ref 12–78)
ANION GAP SERPL CALC-SCNC: 1 MMOL/L (ref 5–15)
AST SERPL-CCNC: 15 U/L (ref 15–37)
BASOPHILS # BLD: 0 K/UL (ref 0–0.1)
BASOPHILS NFR BLD: 0 % (ref 0–1)
BILIRUB SERPL-MCNC: 0.6 MG/DL (ref 0.2–1)
BUN SERPL-MCNC: 17 MG/DL (ref 6–20)
BUN/CREAT SERPL: 17 (ref 12–20)
CALCIUM SERPL-MCNC: 8.3 MG/DL (ref 8.5–10.1)
CHLORIDE SERPL-SCNC: 105 MMOL/L (ref 97–108)
CO2 SERPL-SCNC: 31 MMOL/L (ref 21–32)
CREAT SERPL-MCNC: 0.98 MG/DL (ref 0.7–1.3)
DATE LAST DOSE: ABNORMAL
DIFFERENTIAL METHOD BLD: ABNORMAL
EOSINOPHIL # BLD: 0.2 K/UL (ref 0–0.4)
EOSINOPHIL NFR BLD: 3 % (ref 0–7)
ERYTHROCYTE [DISTWIDTH] IN BLOOD BY AUTOMATED COUNT: 13.2 % (ref 11.5–14.5)
GLOBULIN SER CALC-MCNC: 3.3 G/DL (ref 2–4)
GLUCOSE BLD STRIP.AUTO-MCNC: 143 MG/DL (ref 65–100)
GLUCOSE BLD STRIP.AUTO-MCNC: 163 MG/DL (ref 65–100)
GLUCOSE BLD STRIP.AUTO-MCNC: 218 MG/DL (ref 65–100)
GLUCOSE BLD STRIP.AUTO-MCNC: 273 MG/DL (ref 65–100)
GLUCOSE SERPL-MCNC: 183 MG/DL (ref 65–100)
HCT VFR BLD AUTO: 34.9 % (ref 36.6–50.3)
HGB BLD-MCNC: 11.9 G/DL (ref 12.1–17)
IMM GRANULOCYTES # BLD AUTO: 0 K/UL (ref 0–0.04)
IMM GRANULOCYTES NFR BLD AUTO: 0 % (ref 0–0.5)
LYMPHOCYTES # BLD: 1.3 K/UL (ref 0.8–3.5)
LYMPHOCYTES NFR BLD: 17 % (ref 12–49)
MCH RBC QN AUTO: 28.7 PG (ref 26–34)
MCHC RBC AUTO-ENTMCNC: 34.1 G/DL (ref 30–36.5)
MCV RBC AUTO: 84.1 FL (ref 80–99)
MONOCYTES # BLD: 0.5 K/UL (ref 0–1)
MONOCYTES NFR BLD: 7 % (ref 5–13)
NEUTS SEG # BLD: 5.4 K/UL (ref 1.8–8)
NEUTS SEG NFR BLD: 73 % (ref 32–75)
NRBC # BLD: 0 K/UL (ref 0–0.01)
NRBC BLD-RTO: 0 PER 100 WBC
PLATELET # BLD AUTO: 204 K/UL (ref 150–400)
PMV BLD AUTO: 9.1 FL (ref 8.9–12.9)
POTASSIUM SERPL-SCNC: 3.6 MMOL/L (ref 3.5–5.1)
PROT SERPL-MCNC: 6.3 G/DL (ref 6.4–8.2)
RBC # BLD AUTO: 4.15 M/UL (ref 4.1–5.7)
REPORTED DOSE,DOSE: ABNORMAL UNITS
REPORTED DOSE/TIME,TMG: ABNORMAL
SERVICE CMNT-IMP: ABNORMAL
SODIUM SERPL-SCNC: 137 MMOL/L (ref 136–145)
VANCOMYCIN TROUGH SERPL-MCNC: 13.8 UG/ML (ref 5–10)
WBC # BLD AUTO: 7.5 K/UL (ref 4.1–11.1)

## 2020-11-21 PROCEDURE — 80202 ASSAY OF VANCOMYCIN: CPT

## 2020-11-21 PROCEDURE — 82962 GLUCOSE BLOOD TEST: CPT

## 2020-11-21 PROCEDURE — 85025 COMPLETE CBC W/AUTO DIFF WBC: CPT

## 2020-11-21 PROCEDURE — 80053 COMPREHEN METABOLIC PANEL: CPT

## 2020-11-21 PROCEDURE — 74011636637 HC RX REV CODE- 636/637: Performed by: INTERNAL MEDICINE

## 2020-11-21 PROCEDURE — 74011250636 HC RX REV CODE- 250/636

## 2020-11-21 PROCEDURE — 74011636637 HC RX REV CODE- 636/637: Performed by: FAMILY MEDICINE

## 2020-11-21 PROCEDURE — 74011250636 HC RX REV CODE- 250/636: Performed by: INTERNAL MEDICINE

## 2020-11-21 PROCEDURE — 74011250637 HC RX REV CODE- 250/637: Performed by: INTERNAL MEDICINE

## 2020-11-21 PROCEDURE — 74011250637 HC RX REV CODE- 250/637: Performed by: FAMILY MEDICINE

## 2020-11-21 PROCEDURE — 65270000029 HC RM PRIVATE

## 2020-11-21 PROCEDURE — 74011000250 HC RX REV CODE- 250: Performed by: INTERNAL MEDICINE

## 2020-11-21 PROCEDURE — 36415 COLL VENOUS BLD VENIPUNCTURE: CPT

## 2020-11-21 RX ORDER — INSULIN GLARGINE 100 [IU]/ML
48 INJECTION, SOLUTION SUBCUTANEOUS
Status: DISCONTINUED | OUTPATIENT
Start: 2020-11-21 | End: 2020-11-22 | Stop reason: HOSPADM

## 2020-11-21 RX ADMIN — INSULIN LISPRO 3 UNITS: 100 INJECTION, SOLUTION INTRAVENOUS; SUBCUTANEOUS at 18:14

## 2020-11-21 RX ADMIN — CLONIDINE HYDROCHLORIDE 0.4 MG: 0.1 TABLET ORAL at 20:23

## 2020-11-21 RX ADMIN — VANCOMYCIN HYDROCHLORIDE 1500 MG: 10 INJECTION, POWDER, LYOPHILIZED, FOR SOLUTION INTRAVENOUS at 18:16

## 2020-11-21 RX ADMIN — Medication 10 ML: at 20:27

## 2020-11-21 RX ADMIN — CEFEPIME 2 G: 2 INJECTION, POWDER, FOR SOLUTION INTRAVENOUS at 01:40

## 2020-11-21 RX ADMIN — AMLODIPINE BESYLATE 10 MG: 5 TABLET ORAL at 20:23

## 2020-11-21 RX ADMIN — INSULIN LISPRO 5 UNITS: 100 INJECTION, SOLUTION INTRAVENOUS; SUBCUTANEOUS at 11:40

## 2020-11-21 RX ADMIN — MIRTAZAPINE 15 MG: 15 TABLET, FILM COATED ORAL at 20:25

## 2020-11-21 RX ADMIN — CLOPIDOGREL BISULFATE 75 MG: 75 TABLET ORAL at 20:21

## 2020-11-21 RX ADMIN — Medication 10 ML: at 14:47

## 2020-11-21 RX ADMIN — CLONIDINE HYDROCHLORIDE 0.4 MG: 0.1 TABLET ORAL at 08:18

## 2020-11-21 RX ADMIN — TAMSULOSIN HYDROCHLORIDE 0.4 MG: 0.4 CAPSULE ORAL at 18:16

## 2020-11-21 RX ADMIN — CEFEPIME 2 G: 2 INJECTION, POWDER, FOR SOLUTION INTRAVENOUS at 08:18

## 2020-11-21 RX ADMIN — Medication 10 ML: at 06:17

## 2020-11-21 RX ADMIN — CEFEPIME 2 G: 2 INJECTION, POWDER, FOR SOLUTION INTRAVENOUS at 18:15

## 2020-11-21 RX ADMIN — ATORVASTATIN CALCIUM 80 MG: 20 TABLET, FILM COATED ORAL at 20:24

## 2020-11-21 RX ADMIN — PREGABALIN 50 MG: 25 CAPSULE ORAL at 18:15

## 2020-11-21 RX ADMIN — PREGABALIN 50 MG: 25 CAPSULE ORAL at 08:18

## 2020-11-21 RX ADMIN — Medication 81 MG: at 20:22

## 2020-11-21 RX ADMIN — FLUOXETINE HYDROCHLORIDE 10 MG: 20 LIQUID ORAL at 18:15

## 2020-11-21 RX ADMIN — VALSARTAN 40 MG: 80 TABLET, FILM COATED ORAL at 08:18

## 2020-11-21 RX ADMIN — INSULIN GLARGINE 48 UNITS: 100 INJECTION, SOLUTION SUBCUTANEOUS at 22:25

## 2020-11-21 RX ADMIN — ENOXAPARIN SODIUM 40 MG: 40 INJECTION SUBCUTANEOUS at 08:18

## 2020-11-21 RX ADMIN — INSULIN LISPRO 2 UNITS: 100 INJECTION, SOLUTION INTRAVENOUS; SUBCUTANEOUS at 08:18

## 2020-11-21 RX ADMIN — TRAZODONE HYDROCHLORIDE 50 MG: 50 TABLET ORAL at 20:30

## 2020-11-21 RX ADMIN — POLYETHYLENE GLYCOL 3350 17 G: 17 POWDER, FOR SOLUTION ORAL at 20:25

## 2020-11-21 RX ADMIN — CHLORTHALIDONE 25 MG: 25 TABLET ORAL at 08:18

## 2020-11-21 RX ADMIN — METOPROLOL SUCCINATE 100 MG: 25 TABLET, EXTENDED RELEASE ORAL at 20:21

## 2020-11-21 RX ADMIN — VANCOMYCIN HYDROCHLORIDE 1500 MG: 10 INJECTION, POWDER, LYOPHILIZED, FOR SOLUTION INTRAVENOUS at 06:17

## 2020-11-21 NOTE — PROGRESS NOTES
[address] Dosing Services: Vancomycin trough evaluation 11/21/20     Consult for antibiotic dosing of Vancomycin by Dr. Esau Back. Indication: SSTI toe (Xray negative for osteomyelitis; per MRI: \"Great toe soft tissue ulceration with underlying cellulitis. No drainable  abscess. Reactive bone marrow edema is favored over osteomyelitis in the distal phalanx. \")  Day of Therapy: 3    Trough: 13.8mcg/mL drawn timely. Level is therapeutic. Continue the same @ 1.5gm q12h. Pharmacy to continue to follow daily. Thank you,    Honorio Dubon, Pharm D.         Contact: [phone]      Date Dose & Interval Measured (mcg/mL) Extrapolated (mcg/mL)   11/21/2020 1,500 mg IV every 12 hours 13.8 13.3

## 2020-11-21 NOTE — PROGRESS NOTES
Problem: Falls - Risk of  Goal: *Absence of Falls  Description: Document Lora Bridges Fall Risk and appropriate interventions in the flowsheet. Outcome: Progressing Towards Goal  Note: Fall Risk Interventions:            Medication Interventions: Evaluate medications/consider consulting pharmacy         History of Falls Interventions: Evaluate medications/consider consulting pharmacy         Problem: Cellulitis Care Plan (Adult)  Goal: *Control of acute pain  Outcome: Progressing Towards Goal  Goal: *Skin integrity maintained  Outcome: Progressing Towards Goal  Goal: *Absence of infection signs and symptoms  Outcome: Progressing Towards Goal     Problem: Diabetes Self-Management  Goal: *Disease process and treatment process  Description: Define diabetes and identify own type of diabetes; list 3 options for treating diabetes. Outcome: Progressing Towards Goal  Goal: *Incorporating nutritional management into lifestyle  Description: Describe effect of type, amount and timing of food on blood glucose; list 3 methods for planning meals. Outcome: Progressing Towards Goal  Goal: *Incorporating physical activity into lifestyle  Description: State effect of exercise on blood glucose levels. Outcome: Progressing Towards Goal  Goal: *Developing strategies to promote health/change behavior  Description: Define the ABC's of diabetes; identify appropriate screenings, schedule and personal plan for screenings. Outcome: Progressing Towards Goal  Goal: *Using medications safely  Description: State effect of diabetes medications on diabetes; name diabetes medication taking, action and side effects. Outcome: Progressing Towards Goal  Goal: *Monitoring blood glucose, interpreting and using results  Description: Identify recommended blood glucose targets  and personal targets.   Outcome: Progressing Towards Goal  Goal: *Prevention, detection, treatment of acute complications  Description: List symptoms of hyper- and hypoglycemia; describe how to treat low blood sugar and actions for lowering  high blood glucose level. Outcome: Progressing Towards Goal  Goal: *Prevention, detection and treatment of chronic complications  Description: Define the natural course of diabetes and describe the relationship of blood glucose levels to long term complications of diabetes.   Outcome: Progressing Towards Goal  Goal: *Developing strategies to address psychosocial issues  Description: Describe feelings about living with diabetes; identify support needed and support network  Outcome: Progressing Towards Goal  Goal: *Insulin pump training  Outcome: Progressing Towards Goal  Goal: *Sick day guidelines  Outcome: Progressing Towards Goal  Goal: *Patient Specific Goal (EDIT GOAL, INSERT TEXT)  Outcome: Progressing Towards Goal

## 2020-11-21 NOTE — PROGRESS NOTES
Bedside and Verbal shift change report given to [address] [place] [address] (oncoming nurse) by Angel (offgoing nurse). Report included the following information SBAR, Kardex and MAR.

## 2020-11-21 NOTE — PROGRESS NOTES
Daily Progress Note: 11/21/2020  Bartolome Machado MD    Assessment/Plan:   Type 2 diabetes mellitus with left diabetic foot infection ([facility] (11/19/2020) POA: MRI with cellulitis NOT osteomyelitis  -wound cx GNR and GPC  - Blood culture NG x2D  - Vancomycin, Cefepime  - ID following rec 4w PO abx course at discharge  - Podiatry debrideded at bedside, plans for outpt follow up    DM type 2 causing vascular disease ([facility] [address] / DM type 2 causing neurological disease ([facility] [address] / DM type 2 causing [facility] disease [facility] POA: A1c 9.1.  BS 160s-250s. - Endo following  -increase lantus 48 and SSI  - DM diet   -diabetic teaching     Essential hypertension (9/1/2013) POA: BP uncontrolled. SBPs 110s-140s  - Amlodipine, [place]. Catapres, Metoprolol, Valsartan     CAD in native artery / Dyslipidemia (9/2/2013) POA:   -  Asprin, Plavix, Metoprolol and Lipitor     Hypokalemia due to loss of potassium (5/5/2016) POA: resolved   -  follow BMP     Depression with anxiety (11/19/2020) POA:   - Remeron and Prozac, trazodone     BPH (benign prostatic hyperplasia) (9/1/2013) POA:   - Flomax     Constipation: not new for him  -watch for now    Dispo home tomorrow if cultures resulted.      Problem List:  Problem List as of 11/21/2020 Date Reviewed: 11/17/2019          Codes Class Noted - Resolved    CAD in native artery (Chronic) ICD-10-CM: I25.10  ICD-9-CM: 414.01  Unknown - Present        * (Principal) Type 2 diabetes mellitus with left diabetic foot infection ([facility] ICD-10-CM: E11.628, L08.9  ICD-9-CM: 250.80, 686.9  11/19/2020 - Present        Depression with anxiety ICD-10-CM: F41.8  ICD-9-CM: 300.4  11/19/2020 - Present        Pulmonary embolism ([facility] ICD-10-CM: I26.99  ICD-9-CM: 415.19  6/2/2018 - Present        H/O: stroke ICD-10-CM: Z86.73  ICD-9-CM: V12.54  6/2/2018 - Present        Type 2 DM with CKD and hypertension ([facility] ICD-10-CM: E11.22, I12.9  ICD-9-CM: 250.40, 403.90  6/2/2018 - Present        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 ICD-10-CM: I69.392  ICD-9-CM: 438.83  6/2/2018 - Present        Cerebral vascular disease ICD-10-CM: I67.9  ICD-9-CM: 437.9  5/30/2017 - Present        History of acute inferior wall MI ICD-10-CM: I25.2  ICD-9-CM: 660  5/30/2017 - Present        CVA (cerebral vascular accident) [facility]) ICD-10-CM: I63.9  ICD-9-CM: 434.91  5/5/2016 - Present        Neuropathy ICD-10-CM: G62.9  ICD-9-CM: 355.9  Unknown - Present        DM type 2 causing vascular disease (HCC) ICD-10-CM: E11.59  ICD-9-CM: 250.70, 443.81  Unknown - Present        DM type 2 causing neurological disease ([facility] ICD-10-CM: E11.49  ICD-9-CM: 250.60  Unknown - Present        Lupus ([facility] ICD-10-CM: M32.9  ICD-9-CM: 710.0  Unknown - Present        Thoracic outlet syndrome ICD-10-CM: G54.0  ICD-9-CM: 353.0  Unknown - Present        Left-sided weakness ICD-10-CM: R53.1  ICD-9-CM: 728.87  5/5/2016 - Present        Dysarthria ICD-10-CM: R47.1  ICD-9-CM: 784.51  5/5/2016 - Present        DM type 2 causing renal disease (HCC) ICD-10-CM: E11.29  ICD-9-CM: 250.40  Unknown - Present        CKD (chronic kidney disease), stage III ([facility] ICD-10-CM: N18.30  ICD-9-CM: 740. 3  Unknown - Present        Hypokalemia due to loss of potassium ICD-10-CM: E87.6  ICD-9-CM: 276.8  5/5/2016 - Present        Dyslipidemia ICD-10-CM: E78.5  ICD-9-CM: 272.4  9/2/2013 - Present    Overview Signed 9/2/2013 10:35 AM by Alfonso ALVARADO     9/2/13 Lipid panel   HDL 25              Essential hypertension ICD-10-CM: I10  ICD-9-CM: 401.9  9/1/2013 - Present        BPH (benign prostatic hyperplasia) ICD-10-CM: N40.0  ICD-9-CM: 600.00  9/1/2013 - Present        RESOLVED: Hypoxia ICD-10-CM: R09.02  ICD-9-CM: 799.02  6/2/2018 - 10/16/2018        RESOLVED: Cellulitis of foot ICD-10-CM: D61.449  ICD-9-CM: 682.7  1/25/2016 - 5/5/2016        RESOLVED: Hyponatremia ICD-10-CM: E87.1  ICD-9-CM: 276.1  1/25/2016 - 5/5/2016        RESOLVED: Acute myocardial infarction of other inferior wall, initial episode of care ICD-10-CM: I21.19  ICD-9-CM: 410.41  9/1/2013 - 5/5/2016    Overview Addendum 9/3/2013  7:45 AM by Violet ALVARADO     Cardiac cath 9/1/13 Pressures: normal   LVG: mild inferoapical hypokinesis, EF about 60%, no MR   RCA: occluded distally with faint filling of DV by LCA collaterals   LCA: Small LCX with one ALOM, mild irreg. Ramus proximal 70%, LAD proximal 50% then occluded mid-portion with long area of occlusion and with reconstitution mid-distal, appears to fill by transseptal collaterals. IMP:   2V CAD with acute RCA occlusion --> easily reopened, clean moderate size DV, stented with 3 x 22mm ALLAN -->0%. Although the LAD is not the acute culprit vessel, I was concerned that there was potential for severe ischemia, I thought there may be a very faint antegrade channel, and this might be the best chance to recanalize and stent, vs waiting and doing so later. I attempted to cross the occlusion, multiple guidewires tries, unable to proceed beyond the site of occlusion. I suspect this is chronic, and apparently had good enough collateral flow to not cause patient exertional angina. Stable anatomy. TTE 9/2/13 LVEF 55-60%, mild LVH, mild LAE             RESOLVED: Hyperglycemia due to type 2 diabetes mellitus [facility]) ICD-10-CM: E11.65  ICD-9-CM: 250.00  9/1/2013 - 5/5/2016              HPI:    Mr. Cosme Albright is a 60 y.o. male who is being admitted for Type 2 diabetes mellitus with left diabetic foot infection. Mr. Cosme Albright has a Hx of DM II with neuropathy and has poor lower extremity sensation. He says he had a small wound on plantar surface of his great toe for about 3-4 weeks. In the past four days, he noted progressive redness, swelling and pain. No fever or chills. He presented to our Emergency Department today where a plain xray a first digit ulcer with no radiographic findings of osteomyelitis. He will be admitted for further management.  (Dr Cynthia Xavier)    11/20: MRI favors cellulitis rather than Osteo. Toe is a little better this am. Less erythematous. Little pain. Will ask podiatry and ID to see. He reports his last A1c was 11.     : Podiatry saw pt yesterday and debrided wound at bedside. Rec Darco shoes and outpt follow up. ID rec 4w PO abx. Endo was consulted and adjusted insulin. Pt notes that he sees Dr Lata Osorio 2-3 times per year. His diabetes has been poorly controlled for years. Is considering insulin pump outpt. Says that A1c of 9% is good for him. Tolerating PO. Denies foot pain. No BM in a few days. This is not new for him. Declines bowel regimen at this time. Review of Systems:   A comprehensive review of systems was negative except for that written in the HPI. Objective:   Physical Exam:     Visit Vitals  /74 (BP 1 Location: Left arm, BP Patient Position: At rest)   Pulse (!) 56 Comment: RN notified   Temp 97.9 °F (36.6 °C)   Resp 14   Ht 5' 11\" (1.803 m)   Wt 98.4 kg (217 lb)   SpO2 95%   BMI 30.27 kg/m²      O2 Device: Room air    Temp (24hrs), Av.9 °F (36.6 °C), Min:97.7 °F (36.5 °C), Max:97.9 °F (36.6 °C)    No intake/output data recorded.  1901 -  0700  In: 3280 [P.O.:560; I.V.:2720]  Out: 1 [Urine:1]    General:  Alert, cooperative, no distress, appears stated age. Head:  Normocephalic, without obvious abnormality, atraumatic. Eyes:  Conjunctivae/corneas clear. PERRL, EOMs intact. Nose: Nares normal. Septum midline. Throat: Lips, mucosa, and tongue normal. Teeth and gums normal.   Neck: Supple, symmetrical, trachea midline, no adenopathy, thyroid: no enlargement/tenderness/nodules, no carotid bruit and no JVD. Back:   Symmetric, no curvature. ROM normal. No CVA tenderness. Lungs:   Clear to auscultation bilaterally. Chest wall:  No tenderness or deformity. Heart:  Regular rate and rhythm, S1, S2 normal, no murmur, click, rub or gallop. Abdomen:   Soft, non-tender.  Bowel sounds normal. No masses,  No organomegaly. Extremities: Left foot bandaged, No calf tenderness or cords. Pulses: 2+ and symmetric all extremities. Skin: Skin color, texture, turgor normal. Anular erythematous rash left forearm, no erythema or warmth of shin above bandage   Neurologic: CNII-XII intact. Alert and oriented X 3. Fine motor of hands and fingers normal.   equal.  Gait not tested at this time. Sensation grossly normal to touch. Gross motor of extremities normal.       Data Review:   IMPRESSION: MRI 11/19/20     1. Great toe soft tissue ulceration with underlying cellulitis. No drainable  abscess. Reactive bone marrow edema is favored over osteomyelitis in the distal  phalanx. 2. Small bursitis plantar to the hallux sesamoids. Recent Days:  Recent Labs     11/21/20 0142 11/20/20  0549 11/19/20  1649   WBC 7.5 9.9 11.2*   HGB 11.9* 12.0* 14.4   HCT 34.9* 35.7* 41.8    237 240     Recent Labs     11/21/20 0142 11/20/20  0549 11/19/20  1649    137 135*   K 3.6 3.6 3.5    105 101   CO2 31 29 29   * 173* 311*   BUN 17 16 17   CREA 0.98 0.94 1.13   CA 8.3* 8.0* 9.0   MG  --  2.0  --    ALB 3.0* 2.9* 3.8   TBILI 0.6 0.7 1.0   ALT 19 20 26   INR  --   --  1.1     No results for input(s): PH, PCO2, PO2, HCO3, FIO2 in the last 72 hours.     24 Hour Results:  Recent Results (from the past 24 hour(s))   CULTURE, WOUND W GRAM STAIN    Collection Time: 11/20/20 10:30 AM    Specimen: Great Toe   Result Value Ref Range    Special Requests: NO SPECIAL REQUESTS      GRAM STAIN 2+ WBCS SEEN      GRAM STAIN 2+ GRAM NEGATIVE RODS      GRAM STAIN FEW GRAM POSITIVE COCCI IN PAIRS      Culture result: PENDING    GLUCOSE, POC    Collection Time: 11/20/20 11:47 AM   Result Value Ref Range    Glucose (POC) 223 (H) 65 - 100 mg/dL    Performed by Flash Tovar (PCT)    GLUCOSE, POC    Collection Time: 11/20/20  4:27 PM   Result Value Ref Range    Glucose (POC) 240 (H) 65 - 100 mg/dL    Performed by Flash Tovar (PCT)    GLUCOSE, POC    Collection Time: 11/20/20  9:16 PM   Result Value Ref Range    Glucose (POC) 256 (H) 65 - 100 mg/dL    Performed by Sonia Ji    CBC WITH AUTOMATED DIFF    Collection Time: 11/21/20  1:42 AM   Result Value Ref Range    WBC 7.5 4.1 - 11.1 K/uL    RBC 4.15 4.10 - 5.70 M/uL    HGB 11.9 (L) 12.1 - 17.0 g/dL    HCT 34.9 (L) 36.6 - 50.3 %    MCV 84.1 80.0 - 99.0 FL    MCH 28.7 26.0 - 34.0 PG    MCHC 34.1 30.0 - 36.5 g/dL    RDW 13.2 11.5 - 14.5 %    PLATELET 422 399 - 720 K/uL    MPV 9.1 8.9 - 12.9 FL    NRBC 0.0 0  WBC    ABSOLUTE NRBC 0.00 0.00 - 0.01 K/uL    NEUTROPHILS 73 32 - 75 %    LYMPHOCYTES 17 12 - 49 %    MONOCYTES 7 5 - 13 %    EOSINOPHILS 3 0 - 7 %    BASOPHILS 0 0 - 1 %    IMMATURE GRANULOCYTES 0 0.0 - 0.5 %    ABS. NEUTROPHILS 5.4 1.8 - 8.0 K/UL    ABS. LYMPHOCYTES 1.3 0.8 - 3.5 K/UL    ABS. MONOCYTES 0.5 0.0 - 1.0 K/UL    ABS. EOSINOPHILS 0.2 0.0 - 0.4 K/UL    ABS. BASOPHILS 0.0 0.0 - 0.1 K/UL    ABS. IMM. GRANS. 0.0 0.00 - 0.04 K/UL    DF AUTOMATED     METABOLIC PANEL, COMPREHENSIVE    Collection Time: 11/21/20  1:42 AM   Result Value Ref Range    Sodium 137 136 - 145 mmol/L    Potassium 3.6 3.5 - 5.1 mmol/L    Chloride 105 97 - 108 mmol/L    CO2 31 21 - 32 mmol/L    Anion gap 1 (L) 5 - 15 mmol/L    Glucose 183 (H) 65 - 100 mg/dL    BUN 17 6 - 20 MG/DL    Creatinine 0.98 0.70 - 1.30 MG/DL    BUN/Creatinine ratio 17 12 - 20      GFR est AA >60 >60 ml/min/1.73m2    GFR est non-AA >60 >60 ml/min/1.73m2    Calcium 8.3 (L) 8.5 - 10.1 MG/DL    Bilirubin, total 0.6 0.2 - 1.0 MG/DL    ALT (SGPT) 19 12 - 78 U/L    AST (SGOT) 15 15 - 37 U/L    Alk.  phosphatase 91 45 - 117 U/L    Protein, total 6.3 (L) 6.4 - 8.2 g/dL    Albumin 3.0 (L) 3.5 - 5.0 g/dL    Globulin 3.3 2.0 - 4.0 g/dL    A-G Ratio 0.9 (L) 1.1 - 2.2     GLUCOSE, POC    Collection Time: 11/21/20  7:21 AM   Result Value Ref Range    Glucose (POC) 163 (H) 65 - 100 mg/dL    Performed by Joselin Robertson (PCT) Medications reviewed  Current Facility-Administered Medications   Medication Dose Route Frequency    influenza vaccine 2020-21 (6 mos+)(PF) (FLUARIX/FLULAVAL/FLUZONE QUAD) injection 0.5 mL  0.5 mL IntraMUSCular PRIOR TO DISCHARGE    Vancomycin Trough: please obtain at 1730 on 11/21 (obtain 30 min prior to 1800 dose on 11/21) Thank you!    Other ONCE    traZODone (DESYREL) tablet 50 mg  50 mg Oral QHS PRN    metroNIDAZOLE (FLAGYL) IVPB premix 500 mg  500 mg IntraVENous Q12H    sodium chloride (NS) flush 5-40 mL  5-40 mL IntraVENous Q8H    sodium chloride (NS) flush 5-40 mL  5-40 mL IntraVENous PRN    acetaminophen (TYLENOL) tablet 650 mg  650 mg Oral Q6H PRN    Or    acetaminophen (TYLENOL) suppository 650 mg  650 mg Rectal Q6H PRN    polyethylene glycol (MIRALAX) packet 17 g  17 g Oral DAILY PRN    promethazine (PHENERGAN) tablet 12.5 mg  12.5 mg Oral Q6H PRN    Or    ondansetron (ZOFRAN) injection 4 mg  4 mg IntraVENous Q6H PRN    enoxaparin (LOVENOX) injection 40 mg  40 mg SubCUTAneous DAILY    insulin lispro (HUMALOG) injection   SubCUTAneous AC&HS    glucose chewable tablet 16 g  4 Tab Oral PRN    dextrose (D50W) injection syrg 12.5-25 g  12.5-25 g IntraVENous PRN    glucagon (GLUCAGEN) injection 1 mg  1 mg IntraMUSCular PRN    cefepime (MAXIPIME) 2 g in sterile water (preservative free) 10 mL IV syringe  2 g IntraVENous Q8H    vancomycin (VANCOCIN) 1500 mg in  ml infusion  1,500 mg IntraVENous Q12H    amLODIPine (NORVASC) tablet 10 mg  10 mg Oral DAILY    aspirin delayed-release tablet 81 mg  81 mg Oral DAILY    atorvastatin (LIPITOR) tablet 80 mg  80 mg Oral DAILY    chlorthalidone (HYGROTON) tablet 25 mg  25 mg Oral DAILY    cloNIDine HCL (CATAPRES) tablet 0.4 mg  0.4 mg Oral BID    clopidogreL (PLAVIX) tablet 75 mg  75 mg Oral DAILY    metoprolol succinate (TOPROL-XL) XL tablet 100 mg  100 mg Oral DAILY    mirtazapine (REMERON) tablet 15 mg  15 mg Oral QHS    tamsulosin (FLOMAX) capsule 0.4 mg  0.4 mg Oral QPM    pregabalin (LYRICA) capsule 50 mg  50 mg Oral BID    valsartan (DIOVAN) tablet 40 mg  40 mg Oral DAILY    FLUoxetine (PROzac) 20 mg/5 mL (4 mg/mL) oral solution 10 mg  10 mg Oral QPM    insulin glargine (LANTUS) injection 42 Units  42 Units SubCUTAneous PCD       Care Plan discussed with: Patient/Family    Total time spent with patient and review of records: 30 minutes.     Brandie Dickens MD

## 2020-11-21 NOTE — PROGRESS NOTES
Bedside and Verbal shift change report given to Parveen Day  (oncoming nurse) by Yuriy Segura  (offgoing nurse). Report included the following information SBAR and Kardex.

## 2020-11-22 VITALS
HEART RATE: 64 BPM | OXYGEN SATURATION: 96 % | WEIGHT: 217 LBS | TEMPERATURE: 98.2 F | BODY MASS INDEX: 30.38 KG/M2 | DIASTOLIC BLOOD PRESSURE: 75 MMHG | SYSTOLIC BLOOD PRESSURE: 123 MMHG | RESPIRATION RATE: 16 BRPM | HEIGHT: 71 IN

## 2020-11-22 LAB
ALBUMIN SERPL-MCNC: 2.9 G/DL (ref 3.5–5)
ALBUMIN/GLOB SERPL: 0.8 {RATIO} (ref 1.1–2.2)
ALP SERPL-CCNC: 87 U/L (ref 45–117)
ALT SERPL-CCNC: 21 U/L (ref 12–78)
ANION GAP SERPL CALC-SCNC: 3 MMOL/L (ref 5–15)
AST SERPL-CCNC: 19 U/L (ref 15–37)
BASOPHILS # BLD: 0.1 K/UL (ref 0–0.1)
BASOPHILS NFR BLD: 1 % (ref 0–1)
BILIRUB SERPL-MCNC: 0.7 MG/DL (ref 0.2–1)
BUN SERPL-MCNC: 16 MG/DL (ref 6–20)
BUN/CREAT SERPL: 18 (ref 12–20)
CALCIUM SERPL-MCNC: 8.7 MG/DL (ref 8.5–10.1)
CHLORIDE SERPL-SCNC: 106 MMOL/L (ref 97–108)
CO2 SERPL-SCNC: 30 MMOL/L (ref 21–32)
CREAT SERPL-MCNC: 0.88 MG/DL (ref 0.7–1.3)
DIFFERENTIAL METHOD BLD: NORMAL
EOSINOPHIL # BLD: 0.2 K/UL (ref 0–0.4)
EOSINOPHIL NFR BLD: 3 % (ref 0–7)
ERYTHROCYTE [DISTWIDTH] IN BLOOD BY AUTOMATED COUNT: 13.1 % (ref 11.5–14.5)
GLOBULIN SER CALC-MCNC: 3.5 G/DL (ref 2–4)
GLUCOSE BLD STRIP.AUTO-MCNC: 78 MG/DL (ref 65–100)
GLUCOSE SERPL-MCNC: 91 MG/DL (ref 65–100)
HCT VFR BLD AUTO: 36.6 % (ref 36.6–50.3)
HGB BLD-MCNC: 12.3 G/DL (ref 12.1–17)
IMM GRANULOCYTES # BLD AUTO: 0 K/UL (ref 0–0.04)
IMM GRANULOCYTES NFR BLD AUTO: 0 % (ref 0–0.5)
LYMPHOCYTES # BLD: 1.4 K/UL (ref 0.8–3.5)
LYMPHOCYTES NFR BLD: 19 % (ref 12–49)
MCH RBC QN AUTO: 28.1 PG (ref 26–34)
MCHC RBC AUTO-ENTMCNC: 33.6 G/DL (ref 30–36.5)
MCV RBC AUTO: 83.6 FL (ref 80–99)
MONOCYTES # BLD: 0.6 K/UL (ref 0–1)
MONOCYTES NFR BLD: 8 % (ref 5–13)
NEUTS SEG # BLD: 5.1 K/UL (ref 1.8–8)
NEUTS SEG NFR BLD: 69 % (ref 32–75)
NRBC # BLD: 0 K/UL (ref 0–0.01)
NRBC BLD-RTO: 0 PER 100 WBC
PLATELET # BLD AUTO: 222 K/UL (ref 150–400)
PMV BLD AUTO: 9.4 FL (ref 8.9–12.9)
POTASSIUM SERPL-SCNC: 3.2 MMOL/L (ref 3.5–5.1)
PROT SERPL-MCNC: 6.4 G/DL (ref 6.4–8.2)
RBC # BLD AUTO: 4.38 M/UL (ref 4.1–5.7)
SERVICE CMNT-IMP: NORMAL
SODIUM SERPL-SCNC: 139 MMOL/L (ref 136–145)
WBC # BLD AUTO: 7.3 K/UL (ref 4.1–11.1)

## 2020-11-22 PROCEDURE — 36415 COLL VENOUS BLD VENIPUNCTURE: CPT

## 2020-11-22 PROCEDURE — 85025 COMPLETE CBC W/AUTO DIFF WBC: CPT

## 2020-11-22 PROCEDURE — 82962 GLUCOSE BLOOD TEST: CPT

## 2020-11-22 PROCEDURE — 90471 IMMUNIZATION ADMIN: CPT

## 2020-11-22 PROCEDURE — 74011000250 HC RX REV CODE- 250: Performed by: INTERNAL MEDICINE

## 2020-11-22 PROCEDURE — 74011250636 HC RX REV CODE- 250/636: Performed by: INTERNAL MEDICINE

## 2020-11-22 PROCEDURE — 90686 IIV4 VACC NO PRSV 0.5 ML IM: CPT | Performed by: INTERNAL MEDICINE

## 2020-11-22 PROCEDURE — 74011250637 HC RX REV CODE- 250/637: Performed by: INTERNAL MEDICINE

## 2020-11-22 PROCEDURE — 80053 COMPREHEN METABOLIC PANEL: CPT

## 2020-11-22 PROCEDURE — 74011250636 HC RX REV CODE- 250/636

## 2020-11-22 PROCEDURE — 74011250637 HC RX REV CODE- 250/637: Performed by: FAMILY MEDICINE

## 2020-11-22 RX ORDER — CEFDINIR 300 MG/1
300 CAPSULE ORAL 2 TIMES DAILY
Qty: 56 CAP | Refills: 0 | Status: SHIPPED | OUTPATIENT
Start: 2020-11-22 | End: 2020-12-20

## 2020-11-22 RX ORDER — POTASSIUM CHLORIDE 1500 MG/1
40 TABLET, FILM COATED, EXTENDED RELEASE ORAL DAILY
Qty: 14 TAB | Refills: 0 | Status: SHIPPED | OUTPATIENT
Start: 2020-11-22 | End: 2020-11-29

## 2020-11-22 RX ORDER — POTASSIUM CHLORIDE 750 MG/1
40 TABLET, FILM COATED, EXTENDED RELEASE ORAL DAILY
Status: DISCONTINUED | OUTPATIENT
Start: 2020-11-22 | End: 2020-11-22 | Stop reason: HOSPADM

## 2020-11-22 RX ORDER — DOXYCYCLINE 100 MG/1
100 CAPSULE ORAL 2 TIMES DAILY
Qty: 56 CAP | Refills: 0 | Status: SHIPPED | OUTPATIENT
Start: 2020-11-22 | End: 2020-12-20

## 2020-11-22 RX ADMIN — VANCOMYCIN HYDROCHLORIDE 1500 MG: 10 INJECTION, POWDER, LYOPHILIZED, FOR SOLUTION INTRAVENOUS at 05:04

## 2020-11-22 RX ADMIN — POTASSIUM CHLORIDE 40 MEQ: 750 TABLET, FILM COATED, EXTENDED RELEASE ORAL at 09:39

## 2020-11-22 RX ADMIN — CEFEPIME 2 G: 2 INJECTION, POWDER, FOR SOLUTION INTRAVENOUS at 09:37

## 2020-11-22 RX ADMIN — ENOXAPARIN SODIUM 40 MG: 40 INJECTION SUBCUTANEOUS at 09:38

## 2020-11-22 RX ADMIN — VALSARTAN 40 MG: 80 TABLET, FILM COATED ORAL at 09:39

## 2020-11-22 RX ADMIN — Medication 10 ML: at 00:34

## 2020-11-22 RX ADMIN — CHLORTHALIDONE 25 MG: 25 TABLET ORAL at 09:38

## 2020-11-22 RX ADMIN — PREGABALIN 50 MG: 25 CAPSULE ORAL at 09:38

## 2020-11-22 RX ADMIN — CLONIDINE HYDROCHLORIDE 0.4 MG: 0.1 TABLET ORAL at 09:38

## 2020-11-22 RX ADMIN — INFLUENZA VIRUS VACCINE 0.5 ML: 15; 15; 15; 15 SUSPENSION INTRAMUSCULAR at 10:43

## 2020-11-22 RX ADMIN — CEFEPIME 2 G: 2 INJECTION, POWDER, FOR SOLUTION INTRAVENOUS at 00:33

## 2020-11-22 NOTE — PROGRESS NOTES
Discharge medications reviewed with patient and spouse and appropriate educational materials and side effects teaching were provided. I have reviewed discharge instructions with the patient and spouse. The patient and spouse verbalized understanding. AVS signed by patient. Copy of AVS given to patient.

## 2020-11-22 NOTE — PROGRESS NOTES
Problem: Falls - Risk of  Goal: *Absence of Falls  Description: Document Josemanuel Heredia Fall Risk and appropriate interventions in the flowsheet.   Outcome: Progressing Towards Goal  Note: Fall Risk Interventions:            Medication Interventions: Teach patient to arise slowly         History of Falls Interventions: Evaluate medications/consider consulting pharmacy         Problem: Patient Education: Go to Patient Education Activity  Goal: Patient/Family Education  Outcome: Progressing Towards Goal     Problem: Cellulitis Care Plan (Adult)  Goal: *Control of acute pain  Outcome: Progressing Towards Goal  Goal: *Skin integrity maintained  Outcome: Progressing Towards Goal  Goal: *Absence of infection signs and symptoms  Outcome: Progressing Towards Goal

## 2020-11-22 NOTE — PROGRESS NOTES
Daily Progress and Discharge Note: 11/22/2020  Hipolito Groves MD    Assessment/Plan:   Type 2 diabetes mellitus with left diabetic foot infection ([facility] (11/19/2020) POA: MRI with cellulitis NOT osteomyelitis  -wound cx GBS and staph aureus - final sens still pending  - Blood culture NG x3D  - Vancomycin, Cefepime  - ID following rec 4w cefdinir and doxy outpt  - Podiatry debrideded at bedside, plans for outpt follow up tomorrow    DM type 2 causing vascular disease ([facility] / DM type 2 causing neurological disease ([facility] / DM type 2 causing renal disease ([facility] POA: A1c 9.1. BS 70s-270s  - Endo following  -increase lantus 48 and SSI  - DM diet   -diabetic teaching     Essential hypertension (9/1/2013) POA: BP uncontrolled. SBPs 110s-140s  - Amlodipine, [place].  Catapres, Metoprolol, Valsartan     CAD in native artery / Dyslipidemia (9/2/2013) POA:   -  Asprin, Plavix, Metoprolol and Lipitor     Hypokalemia due to loss of potassium (5/5/2016) POA: low today  - replete and recheck outpt     Depression with anxiety (11/19/2020) POA:   - Remeron and Prozac, trazodone     BPH (benign prostatic hyperplasia) (9/1/2013) POA:   - Flomax     Constipation: not new for him  -watch for now    Dispo home today     Problem List:  Problem List as of 11/22/2020 Date Reviewed: 11/17/2019          Codes Class Noted - Resolved    CAD in native artery (Chronic) ICD-10-CM: I25.10  ICD-9-CM: 414.01  Unknown - Present        * (Principal) Type 2 diabetes mellitus with left diabetic foot infection ([facility] ICD-10-CM: E11.628, L08.9  ICD-9-CM: 250.80, 686.9  11/19/2020 - Present        Depression with anxiety ICD-10-CM: F41.8  ICD-9-CM: 300.4  11/19/2020 - Present        Pulmonary embolism ([facility] ICD-10-CM: I26.99  ICD-9-CM: 415.19  6/2/2018 - Present        H/O: stroke ICD-10-CM: Z86.73  ICD-9-CM: V12.54  6/2/2018 - Present        Type 2 DM with CKD and hypertension ([facility] ICD-10-CM: E11.22, I12.9  ICD-9-CM: 250.40, 403.90 6/2/2018 - Present        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 ICD-10-CM: I69.392  ICD-9-CM: 438.83  6/2/2018 - Present        Cerebral vascular disease ICD-10-CM: I67.9  ICD-9-CM: 437.9  5/30/2017 - Present        History of acute inferior wall MI ICD-10-CM: I25.2  ICD-9-CM: 193  5/30/2017 - Present        CVA (cerebral vascular accident) [facility]) ICD-10-CM: I63.9  ICD-9-CM: 434.91  5/5/2016 - Present        Neuropathy ICD-10-CM: G62.9  ICD-9-CM: 355.9  Unknown - Present        DM type 2 causing vascular disease (HCC) ICD-10-CM: E11.59  ICD-9-CM: 250.70, 443.81  Unknown - Present        DM type 2 causing neurological disease ([facility] ICD-10-CM: E11.49  ICD-9-CM: 250.60  Unknown - Present        Lupus ([facility] ICD-10-CM: M32.9  ICD-9-CM: 710.0  Unknown - Present        Thoracic outlet syndrome ICD-10-CM: G54.0  ICD-9-CM: 353.0  Unknown - Present        Left-sided weakness ICD-10-CM: R53.1  ICD-9-CM: 728.87  5/5/2016 - Present        Dysarthria ICD-10-CM: R47.1  ICD-9-CM: 784.51  5/5/2016 - Present        DM type 2 causing renal disease (HCC) ICD-10-CM: E11.29  ICD-9-CM: 250.40  Unknown - Present        CKD (chronic kidney disease), stage III ([facility] ICD-10-CM: N18.30  ICD-9-CM: 305. 3  Unknown - Present        Hypokalemia due to loss of potassium ICD-10-CM: E87.6  ICD-9-CM: 276.8  5/5/2016 - Present        Dyslipidemia ICD-10-CM: E78.5  ICD-9-CM: 272.4  9/2/2013 - Present    Overview Signed 9/2/2013 10:35 AM by Stuart ALVARADO     9/2/13 Lipid panel   HDL 25              Essential hypertension ICD-10-CM: I10  ICD-9-CM: 401.9  9/1/2013 - Present        BPH (benign prostatic hyperplasia) ICD-10-CM: N40.0  ICD-9-CM: 600.00  9/1/2013 - Present        RESOLVED: Hypoxia ICD-10-CM: R09.02  ICD-9-CM: 799.02  6/2/2018 - 10/16/2018        RESOLVED: Cellulitis of foot ICD-10-CM: Y91.975  ICD-9-CM: 682.7  1/25/2016 - 5/5/2016        RESOLVED: Hyponatremia ICD-10-CM: E87.1  ICD-9-CM: 276.1  1/25/2016 - 5/5/2016 RESOLVED: Acute myocardial infarction of other inferior wall, initial episode of care ICD-10-CM: I21.19  ICD-9-CM: 410.41  9/1/2013 - 5/5/2016    Overview Addendum 9/3/2013  7:45 AM by Sariah Apodaca S     Cardiac cath 9/1/13 Pressures: normal   LVG: mild inferoapical hypokinesis, EF about 60%, no MR   RCA: occluded distally with faint filling of DV by LCA collaterals   LCA: Small LCX with one ALOM, mild irreg. Ramus proximal 70%, LAD proximal 50% then occluded mid-portion with long area of occlusion and with reconstitution mid-distal, appears to fill by transseptal collaterals. IMP:   2V CAD with acute RCA occlusion --> easily reopened, clean moderate size DV, stented with 3 x 22mm ALLAN -->0%. Although the LAD is not the acute culprit vessel, I was concerned that there was potential for severe ischemia, I thought there may be a very faint antegrade channel, and this might be the best chance to recanalize and stent, vs waiting and doing so later. I attempted to cross the occlusion, multiple guidewires tries, unable to proceed beyond the site of occlusion. I suspect this is chronic, and apparently had good enough collateral flow to not cause patient exertional angina. Stable anatomy. TTE 9/2/13 LVEF 55-60%, mild LVH, mild LAE             RESOLVED: Hyperglycemia due to type 2 diabetes mellitus [facility]) ICD-10-CM: E11.65  ICD-9-CM: 250.00  9/1/2013 - 5/5/2016              HPI:    Mr. Shane Love is a 60 y.o. male who is being admitted for Type 2 diabetes mellitus with left diabetic foot infection. Mr. Shane Love has a Hx of DM II with neuropathy and has poor lower extremity sensation. He says he had a small wound on plantar surface of his great toe for about 3-4 weeks. In the past four days, he noted progressive redness, swelling and pain. No fever or chills. He presented to our Emergency Department today where a plain xray a first digit ulcer with no radiographic findings of osteomyelitis.  He will be admitted for further management. (Dr Alcazar Boy)    : MRI favors cellulitis rather than Osteo. Toe is a little better this am. Less erythematous. Little pain. Will ask podiatry and ID to see. He reports his last A1c was 11.     : Podiatry saw pt yesterday and debrided wound at bedside. Rec Darco shoes and outpt follow up. ID rec 4w PO abx. Endo was consulted and adjusted insulin. Pt notes that he sees Dr Felipe James 2-3 times per year. His diabetes has been poorly controlled for years. Is considering insulin pump outpt. Says that A1c of 9% is good for him. Tolerating PO. Denies foot pain. No BM in a few days. This is not new for him. Declines bowel regimen at this time. : wound culture showing GBS and staph areus. The final sens is still pending. Fasting sugar is in range now, but still having highs around mealtimes. K is also low. Pt is eager to get out of the hospital so he can follow up with podiatry tomorrow as planned. Still no BM yet, but sure he will be able to go when he gets home. Review of Systems:   A comprehensive review of systems was negative except for that written in the HPI. Objective:   Physical Exam:     Visit Vitals  /79 (BP 1 Location: Right arm, BP Patient Position: At rest)   Pulse (!) 55   Temp 98.1 °F (36.7 °C)   Resp 16   Ht 5' 11\" (1.803 m)   Wt 98.4 kg (217 lb)   SpO2 95%   BMI 30.27 kg/m²      O2 Device: Room air    Temp (24hrs), Av.4 °F (36.9 °C), Min:98 °F (36.7 °C), Max:98.7 °F (37.1 °C)    No intake/output data recorded.  1901 -  0700  In: 100 [I.V.:100]  Out: -     General:  Alert, cooperative, no distress, appears stated age. Head:  Normocephalic, without obvious abnormality, atraumatic. Eyes:  Conjunctivae/corneas clear. PERRL, EOMs intact. Nose: Nares normal. Septum midline.    Throat: Lips, mucosa, and tongue normal. Teeth and gums normal.   Neck: Supple, symmetrical, trachea midline, no adenopathy, thyroid: no enlargement/tenderness/nodules, no carotid bruit and no JVD. Back:   Symmetric, no curvature. ROM normal. No CVA tenderness. Lungs:   Clear to auscultation bilaterally. Chest wall:  No tenderness or deformity. Heart:  Regular rate and rhythm, S1, S2 normal, no murmur, click, rub or gallop. Abdomen:   Soft, non-tender. Bowel sounds normal. No masses,  No organomegaly. Extremities: Left foot bandaged, No calf tenderness or cords. Pulses: 2+ and symmetric all extremities. Skin: Skin color, texture, turgor normal. Anular erythematous rash left forearm, no erythema or warmth of shin above bandage   Neurologic: CNII-XII intact. Alert and oriented X 3. Fine motor of hands and fingers normal.   equal.  Gait not tested at this time. Sensation grossly normal to touch. Gross motor of extremities normal.       Data Review:   IMPRESSION: MRI 11/19/20     1. Great toe soft tissue ulceration with underlying cellulitis. No drainable  abscess. Reactive bone marrow edema is favored over osteomyelitis in the distal  phalanx. 2. Small bursitis plantar to the hallux sesamoids. Recent Days:  Recent Labs     11/22/20  0153 11/21/20  0142 11/20/20  0549   WBC 7.3 7.5 9.9   HGB 12.3 11.9* 12.0*   HCT 36.6 34.9* 35.7*    204 237     Recent Labs     11/22/20  0153 11/21/20  0142 11/20/20  0549 11/19/20  1649    137 137 135*   K 3.2* 3.6 3.6 3.5    105 105 101   CO2 30 31 29 29   GLU 91 183* 173* 311*   BUN 16 17 16 17   CREA 0.88 0.98 0.94 1.13   CA 8.7 8.3* 8.0* 9.0   MG  --   --  2.0  --    ALB 2.9* 3.0* 2.9* 3.8   TBILI 0.7 0.6 0.7 1.0   ALT 21 19 20 26   INR  --   --   --  1.1     No results for input(s): PH, PCO2, PO2, HCO3, FIO2 in the last 72 hours.     24 Hour Results:  Recent Results (from the past 24 hour(s))   GLUCOSE, POC    Collection Time: 11/21/20 11:25 AM   Result Value Ref Range    Glucose (POC) 273 (H) 65 - 100 mg/dL    Performed by Clarence Paez (PCT)    GLUCOSE, POC Collection Time: 11/21/20  5:09 PM   Result Value Ref Range    Glucose (POC) 218 (H) 65 - 100 mg/dL    Performed by Sarah Cancino (PCT)    Reshma Baron    Collection Time: 11/21/20  5:52 PM   Result Value Ref Range    Vancomycin,trough 13.8 (H) 5.0 - 10.0 ug/mL    Reported dose date NOT PROVIDED      Reported dose time: NOT PROVIDED      Reported dose: NOT PROVIDED UNITS   GLUCOSE, POC    Collection Time: 11/21/20  9:10 PM   Result Value Ref Range    Glucose (POC) 143 (H) 65 - 100 mg/dL    Performed by Jesse Rich (PCT)    CBC WITH AUTOMATED DIFF    Collection Time: 11/22/20  1:53 AM   Result Value Ref Range    WBC 7.3 4.1 - 11.1 K/uL    RBC 4.38 4.10 - 5.70 M/uL    HGB 12.3 12.1 - 17.0 g/dL    HCT 36.6 36.6 - 50.3 %    MCV 83.6 80.0 - 99.0 FL    MCH 28.1 26.0 - 34.0 PG    MCHC 33.6 30.0 - 36.5 g/dL    RDW 13.1 11.5 - 14.5 %    PLATELET 411 052 - 255 K/uL    MPV 9.4 8.9 - 12.9 FL    NRBC 0.0 0  WBC    ABSOLUTE NRBC 0.00 0.00 - 0.01 K/uL    NEUTROPHILS 69 32 - 75 %    LYMPHOCYTES 19 12 - 49 %    MONOCYTES 8 5 - 13 %    EOSINOPHILS 3 0 - 7 %    BASOPHILS 1 0 - 1 %    IMMATURE GRANULOCYTES 0 0.0 - 0.5 %    ABS. NEUTROPHILS 5.1 1.8 - 8.0 K/UL    ABS. LYMPHOCYTES 1.4 0.8 - 3.5 K/UL    ABS. MONOCYTES 0.6 0.0 - 1.0 K/UL    ABS. EOSINOPHILS 0.2 0.0 - 0.4 K/UL    ABS. BASOPHILS 0.1 0.0 - 0.1 K/UL    ABS. IMM.  GRANS. 0.0 0.00 - 0.04 K/UL    DF AUTOMATED     METABOLIC PANEL, COMPREHENSIVE    Collection Time: 11/22/20  1:53 AM   Result Value Ref Range    Sodium 139 136 - 145 mmol/L    Potassium 3.2 (L) 3.5 - 5.1 mmol/L    Chloride 106 97 - 108 mmol/L    CO2 30 21 - 32 mmol/L    Anion gap 3 (L) 5 - 15 mmol/L    Glucose 91 65 - 100 mg/dL    BUN 16 6 - 20 MG/DL    Creatinine 0.88 0.70 - 1.30 MG/DL    BUN/Creatinine ratio 18 12 - 20      GFR est AA >60 >60 ml/min/1.73m2    GFR est non-AA >60 >60 ml/min/1.73m2    Calcium 8.7 8.5 - 10.1 MG/DL    Bilirubin, total 0.7 0.2 - 1.0 MG/DL    ALT (SGPT) 21 12 - 78 U/L AST (SGOT) 19 15 - 37 U/L    Alk.  phosphatase 87 45 - 117 U/L    Protein, total 6.4 6.4 - 8.2 g/dL    Albumin 2.9 (L) 3.5 - 5.0 g/dL    Globulin 3.5 2.0 - 4.0 g/dL    A-G Ratio 0.8 (L) 1.1 - 2.2     GLUCOSE, POC    Collection Time: 11/22/20  6:34 AM   Result Value Ref Range    Glucose (POC) 78 65 - 100 mg/dL    Performed by Jarvis Fabry (PCT)        Medications reviewed  Current Facility-Administered Medications   Medication Dose Route Frequency    insulin glargine (LANTUS) injection 48 Units  48 Units SubCUTAneous QHS    influenza vaccine 2020-21 (6 mos+)(PF) (FLUARIX/FLULAVAL/FLUZONE QUAD) injection 0.5 mL  0.5 mL IntraMUSCular PRIOR TO DISCHARGE    traZODone (DESYREL) tablet 50 mg  50 mg Oral QHS PRN    sodium chloride (NS) flush 5-40 mL  5-40 mL IntraVENous Q8H    sodium chloride (NS) flush 5-40 mL  5-40 mL IntraVENous PRN    acetaminophen (TYLENOL) tablet 650 mg  650 mg Oral Q6H PRN    Or    acetaminophen (TYLENOL) suppository 650 mg  650 mg Rectal Q6H PRN    polyethylene glycol (MIRALAX) packet 17 g  17 g Oral DAILY PRN    promethazine (PHENERGAN) tablet 12.5 mg  12.5 mg Oral Q6H PRN    Or    ondansetron (ZOFRAN) injection 4 mg  4 mg IntraVENous Q6H PRN    enoxaparin (LOVENOX) injection 40 mg  40 mg SubCUTAneous DAILY    insulin lispro (HUMALOG) injection   SubCUTAneous AC&HS    glucose chewable tablet 16 g  4 Tab Oral PRN    dextrose (D50W) injection syrg 12.5-25 g  12.5-25 g IntraVENous PRN    glucagon (GLUCAGEN) injection 1 mg  1 mg IntraMUSCular PRN    cefepime (MAXIPIME) 2 g in sterile water (preservative free) 10 mL IV syringe  2 g IntraVENous Q8H    vancomycin (VANCOCIN) 1500 mg in  ml infusion  1,500 mg IntraVENous Q12H    amLODIPine (NORVASC) tablet 10 mg  10 mg Oral DAILY    aspirin delayed-release tablet 81 mg  81 mg Oral DAILY    atorvastatin (LIPITOR) tablet 80 mg  80 mg Oral DAILY    chlorthalidone (HYGROTON) tablet 25 mg  25 mg Oral DAILY    cloNIDine HCL (CATAPRES) tablet 0.4 mg  0.4 mg Oral BID    clopidogreL (PLAVIX) tablet 75 mg  75 mg Oral DAILY    metoprolol succinate (TOPROL-XL) XL tablet 100 mg  100 mg Oral DAILY    mirtazapine (REMERON) tablet 15 mg  15 mg Oral QHS    tamsulosin (FLOMAX) capsule 0.4 mg  0.4 mg Oral QPM    pregabalin (LYRICA) capsule 50 mg  50 mg Oral BID    valsartan (DIOVAN) tablet 40 mg  40 mg Oral DAILY    FLUoxetine (PROzac) 20 mg/5 mL (4 mg/mL) oral solution 10 mg  10 mg Oral QPM       Care Plan discussed with: Patient/Family    Total time spent with patient and review of records: 30 minutes.     Bernardino iBggs MD

## 2020-11-22 NOTE — PROGRESS NOTES
Bedside, Verbal and Written shift change report given to Matt Massey (oncoming nurse) by Ernie Martin (offgoing nurse). Report included the following information SBAR, Kardex, ED Summary, Procedure Summary, Intake/Output, MAR, Accordion, Recent Results and Med Rec Status.

## 2020-11-22 NOTE — PROGRESS NOTES
Bedside and Verbal shift change report given to Ana Luisa Nugent RN (oncoming nurse) by Jerad Osorio RN (offgoing nurse). Report included the following information SBAR, Kardex, Intake/Output, MAR, Accordion and Recent Results.

## 2020-11-22 NOTE — DISCHARGE INSTRUCTIONS
Patient Discharge Instructions    Cam Jump / [id number] : 1965    Admitted 2020 Discharged: 2020 9:29 AM     ACUTE DIAGNOSES:  Type 2 diabetes mellitus with left diabetic foot infection ([facility] [E11.628, L08.9]    CHRONIC MEDICAL DIAGNOSES:  Problem List as of 2020 Date Reviewed: 2019          Codes Class Noted - Resolved    CAD in native artery (Chronic) ICD-10-CM: I25.10  ICD-9-CM: 414.01  Unknown - Present        * (Principal) Type 2 diabetes mellitus with left diabetic foot infection ([facility] ICD-10-CM: E11.628, L08.9  ICD-9-CM: 250.80, 686.9  2020 - Present        Depression with anxiety ICD-10-CM: F41.8  ICD-9-CM: 300.4  2020 - Present        Pulmonary embolism ([facility] ICD-10-CM: I26.99  ICD-9-CM: 415.19  2018 - Present        H/O: stroke ICD-10-CM: Z86.73  ICD-9-CM: V12.54  2018 - Present        Type 2 DM with CKD and hypertension ([facility] ICD-10-CM: E11.22, I12.9  ICD-9-CM: 250.40, 403.90  2018 - Present        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 ICD-10-CM: I69.392  ICD-9-CM: 438.83  2018 - Present        Cerebral vascular disease ICD-10-CM: I67.9  ICD-9-CM: 437.9  2017 - Present        History of acute inferior wall MI ICD-10-CM: I25.2  ICD-9-CM: 142  2017 - Present        CVA (cerebral vascular accident) [facility]) ICD-10-CM: I63.9  ICD-9-CM: 434.91  2016 - Present        Neuropathy ICD-10-CM: G62.9  ICD-9-CM: 355.9  Unknown - Present        DM type 2 causing vascular disease (HCC) ICD-10-CM: E11.59  ICD-9-CM: 250.70, 443.81  Unknown - Present        DM type 2 causing neurological disease ([facility] ICD-10-CM: E11.49  ICD-9-CM: 250.60  Unknown - Present        Lupus ([facility] ICD-10-CM: M32.9  ICD-9-CM: 710.0  Unknown - Present        Thoracic outlet syndrome ICD-10-CM: G54.0  ICD-9-CM: 353.0  Unknown - Present        Left-sided weakness ICD-10-CM: R53.1  ICD-9-CM: 728.87  2016 - Present        Dysarthria ICD-10-CM: R47.1  ICD-9-CM: 784.51  2016 - Present        DM type 2 causing renal disease (HCC) ICD-10-CM: E11.29  ICD-9-CM: 250.40  Unknown - Present        CKD (chronic kidney disease), stage III ([facility] ICD-10-CM: N18.30  ICD-9-CM: 620. 3  Unknown - Present        Hypokalemia due to loss of potassium ICD-10-CM: E87.6  ICD-9-CM: 276.8  5/5/2016 - Present        Dyslipidemia ICD-10-CM: E78.5  ICD-9-CM: 272.4  9/2/2013 - Present    Overview Signed 9/2/2013 10:35 AM by Casa ALVARADO     9/2/13 Lipid panel   HDL 25              Essential hypertension ICD-10-CM: I10  ICD-9-CM: 401.9  9/1/2013 - Present        BPH (benign prostatic hyperplasia) ICD-10-CM: N40.0  ICD-9-CM: 600.00  9/1/2013 - Present        RESOLVED: Hypoxia ICD-10-CM: R09.02  ICD-9-CM: 799.02  6/2/2018 - 10/16/2018        RESOLVED: Cellulitis of foot ICD-10-CM: R31.742  ICD-9-CM: 682.7  1/25/2016 - 5/5/2016        RESOLVED: Hyponatremia ICD-10-CM: E87.1  ICD-9-CM: 276.1  1/25/2016 - 5/5/2016        RESOLVED: Acute myocardial infarction of other inferior wall, initial episode of care ICD-10-CM: I21.19  ICD-9-CM: 410.41  9/1/2013 - 5/5/2016    Overview Addendum 9/3/2013  7:45 AM by [facility]     Cardiac cath 9/1/13 Pressures: normal   LVG: mild inferoapical hypokinesis, EF about 60%, no MR   RCA: occluded distally with faint filling of DV by LCA collaterals   LCA: Small LCX with one ALOM, mild irreg. Ramus proximal 70%, LAD proximal 50% then occluded mid-portion with long area of occlusion and with reconstitution mid-distal, appears to fill by transseptal collaterals. IMP:   2V CAD with acute RCA occlusion --> easily reopened, clean moderate size DV, stented with 3 x 22mm ALLAN -->0%. Although the LAD is not the acute culprit vessel, I was concerned that there was potential for severe ischemia, I thought there may be a very faint antegrade channel, and this might be the best chance to recanalize and stent, vs waiting and doing so later.    I attempted to cross the occlusion, multiple guidewires tries, unable to proceed beyond the site of occlusion. I suspect this is chronic, and apparently had good enough collateral flow to not cause patient exertional angina. Stable anatomy. TTE 9/2/13 LVEF 55-60%, mild LVH, mild LAE             RESOLVED: Hyperglycemia due to type 2 diabetes mellitus [facility] ICD-10-CM: E11.65  ICD-9-CM: 250.00  9/1/2013 - 5/5/2016              DISCHARGE MEDICATIONS:   · It is important that you take the medication exactly as they are prescribed. · Keep your medication in the bottles provided by the pharmacist and keep a list of the medication names, dosages, and times to be taken in your wallet. · Do not take other medications without consulting your doctor. DIET:  Diabetic Diet    ACTIVITY: per podiatrist's recs with boot    ADDITIONAL INFORMATION: If you experience any of the following symptoms then please call your primary care physician or return to the emergency room if you cannot get hold of your doctor: Fever, chills, nausea, vomiting, diarrhea, change in mentation, falling, bleeding, shortness of breath. FOLLOW UP CARE:  Dr. Benita Cavazos MD  you are to call and set up an appointment to see them in 3-5 days for potassium and sugar recheck. Follow-up with Dr Margaux Gonzalez in 1 day as planned. [phone]  [facility] [id number]         Information obtained by :  I understand that if any problems occur once I am at home I am to contact my physician. I understand and acknowledge receipt of the instructions indicated above.                                                                                                                                            Physician's or R.N.'s Signature                                                                  Date/Time Patient or Representative Signature                                                          Date/Time

## 2020-11-22 NOTE — DISCHARGE SUMMARY
Physician Discharge Summary     Patient ID:    Cecil Frankel  [id number]  60 y.o.  1965  Omer Amaya MD    Admit date: 11/19/2020    Discharge date and time: 11/22/2020    Admission Diagnoses: Type 2 diabetes mellitus with left diabetic foot infection ([facility] [V95.781, L08.9]    Chronic Diagnoses:    Problem List as of 11/22/2020 Date Reviewed: 11/17/2019          Codes Class Noted - Resolved    CAD in native artery (Chronic) ICD-10-CM: I25.10  ICD-9-CM: 414.01  Unknown - Present        * (Principal) Type 2 diabetes mellitus with left diabetic foot infection ([facility] ICD-10-CM: E11.628, L08.9  ICD-9-CM: 250.80, 686.9  11/19/2020 - Present        Depression with anxiety ICD-10-CM: F41.8  ICD-9-CM: 300.4  11/19/2020 - Present        Pulmonary embolism ([facility] ICD-10-CM: I26.99  ICD-9-CM: 415.19  6/2/2018 - Present        H/O: stroke ICD-10-CM: Z86.73  ICD-9-CM: V12.54  6/2/2018 - Present        Type 2 DM with CKD and hypertension ([place] [address].) ICD-10-CM: E11.22, I12.9  ICD-9-CM: 250.40, 403.90  6/2/2018 - Present        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 ICD-10-CM: I69.392  ICD-9-CM: 438.83  6/2/2018 - Present        Cerebral vascular disease ICD-10-CM: I67.9  ICD-9-CM: 437.9  5/30/2017 - Present        History of acute inferior wall MI ICD-10-CM: I25.2  ICD-9-CM: 037  5/30/2017 - Present        CVA (cerebral vascular accident) [facility]) ICD-10-CM: I63.9  ICD-9-CM: 434.91  5/5/2016 - Present        Neuropathy ICD-10-CM: G62.9  ICD-9-CM: 355.9  Unknown - Present        DM type 2 causing vascular disease (HCC) ICD-10-CM: E11.59  ICD-9-CM: 250.70, 443.81  Unknown - Present        DM type 2 causing neurological disease ([facility] ICD-10-CM: E11.49  ICD-9-CM: 250.60  Unknown - Present        Lupus ([facility] ICD-10-CM: M32.9  ICD-9-CM: 710.0  Unknown - Present        Thoracic outlet syndrome ICD-10-CM: G54.0  ICD-9-CM: 353.0  Unknown - Present        Left-sided weakness ICD-10-CM: R53.1  ICD-9-CM: 728.87  5/5/2016 - Present Dysarthria ICD-10-CM: R47.1  ICD-9-CM: 784.51  5/5/2016 - Present        DM type 2 causing renal disease (HCC) ICD-10-CM: E11.29  ICD-9-CM: 250.40  Unknown - Present        CKD (chronic kidney disease), stage III ([facility] ICD-10-CM: N18.30  ICD-9-CM: 421. 3  Unknown - Present        Hypokalemia due to loss of potassium ICD-10-CM: E87.6  ICD-9-CM: 276.8  5/5/2016 - Present        Dyslipidemia ICD-10-CM: E78.5  ICD-9-CM: 272.4  9/2/2013 - Present    Overview Signed 9/2/2013 10:35 AM by Milad Blum S     9/2/13 Lipid panel   HDL 25              Essential hypertension ICD-10-CM: I10  ICD-9-CM: 401.9  9/1/2013 - Present        BPH (benign prostatic hyperplasia) ICD-10-CM: N40.0  ICD-9-CM: 600.00  9/1/2013 - Present        RESOLVED: Hypoxia ICD-10-CM: R09.02  ICD-9-CM: 799.02  6/2/2018 - 10/16/2018        RESOLVED: Cellulitis of foot ICD-10-CM: J44.429  ICD-9-CM: 682.7  1/25/2016 - 5/5/2016        RESOLVED: Hyponatremia ICD-10-CM: E87.1  ICD-9-CM: 276.1  1/25/2016 - 5/5/2016        RESOLVED: Acute myocardial infarction of other inferior wall, initial episode of care ICD-10-CM: I21.19  ICD-9-CM: 410.41  9/1/2013 - 5/5/2016    Overview Addendum 9/3/2013  7:45 AM by Achilles [place] S     Cardiac cath 9/1/13 Pressures: normal   LVG: mild inferoapical hypokinesis, EF about 60%, no MR   RCA: occluded distally with faint filling of DV by LCA collaterals   LCA: Small LCX with one ALOM, mild irreg. Ramus proximal 70%, LAD proximal 50% then occluded mid-portion with long area of occlusion and with reconstitution mid-distal, appears to fill by transseptal collaterals. IMP:   2V CAD with acute RCA occlusion --> easily reopened, clean moderate size DV, stented with 3 x 22mm ALLAN -->0%.    Although the LAD is not the acute culprit vessel, I was concerned that there was potential for severe ischemia, I thought there may be a very faint antegrade channel, and this might be the best chance to recanalize and stent, vs waiting and doing so later. I attempted to cross the occlusion, multiple guidewires tries, unable to proceed beyond the site of occlusion. I suspect this is chronic, and apparently had good enough collateral flow to not cause patient exertional angina. Stable anatomy. TTE 9/2/13 LVEF 55-60%, mild LVH, mild LAE             RESOLVED: Hyperglycemia due to type 2 diabetes mellitus ([facility] ICD-10-CM: E11.65  ICD-9-CM: 250.00  9/1/2013 - 5/5/2016              Discharge Medications:   Current Discharge Medication List      START taking these medications    Details   cefdinir (OMNICEF) 300 mg capsule Take 1 Cap by mouth two (2) times a day for 28 days. Qty: 56 Cap, Refills: 0      doxycycline (MONODOX) 100 mg capsule Take 1 Cap by mouth two (2) times a day for 28 days. Qty: 56 Cap, Refills: 0      potassium chloride SR (K-TAB) 20 mEq tablet Take 2 Tabs by mouth daily for 7 days. Qty: 14 Tab, Refills: 0         CONTINUE these medications which have NOT CHANGED    Details   amLODIPine (NORVASC) 10 mg tablet Take 10 mg by mouth daily. chlorthalidone (HYGROTON) 25 mg tablet Take 25 mg by mouth daily. pregabalin (Lyrica) 50 mg capsule Take 50 mg by mouth two (2) times a day. metFORMIN (GLUMETZA ER) 500 mg TG24 24 hour tablet Take 1,000 mg by mouth two (2) times a day. aspirin delayed-release 81 mg tablet Take 1 Tab by mouth daily. Associated Diagnoses: CAD in native artery      atorvastatin (LIPITOR) 80 mg tablet Take 80 mg by mouth daily. lisdexamfetamine (VYVANSE) 30 mg capsule Take 30 mg by mouth as needed. FLUoxetine (PROZAC) 10 mg tablet Take 10 mg by mouth daily. olmesartan (BENICAR) 20 mg tablet Take 20 mg by mouth daily. cloNIDine HCl (CATAPRES) 0.2 mg tablet Take 0.4 mg by mouth two (2) times a day. metoprolol succinate (TOPROL-XL) 100 mg tablet Take 100 mg by mouth daily. mirtazapine (REMERON) 15 mg tablet Take 15 mg by mouth nightly.       insulin lispro protamine/insulin lispro (HUMALOG MIX 75-25) 100 unit/mL (75-25) injection 35-75 Units by SubCUTAneous route two (2) times a day. Based on BG      cyclobenzaprine (FLEXERIL) 10 mg tablet Take 10 mg by mouth nightly. clopidogrel (PLAVIX) 75 mg tablet Take 1 Tab by mouth daily. Qty: 30 Tab, Refills: 2      tamsulosin (FLOMAX) 0.4 mg capsule Take 0.4 mg by mouth every evening. traZODone (DESYREL) 50 mg tablet Take 50 mg by mouth as needed. Follow up Care:    1. Shweta Birch MD in 3-5 days  2. Dr Agustin Bailey Monday    Diet:  Diabetic Diet    Disposition:  Home. Advanced Directive:    Discharge Exam:  [See today's progress note.]    CONSULTATIONS: ID, Endocrine and Podiatry    Significant Diagnostic Studies:   Recent Labs     11/22/20 0153 11/21/20  0142   WBC 7.3 7.5   HGB 12.3 11.9*   HCT 36.6 34.9*    204     Recent Labs     11/22/20 0153 11/21/20  0142 11/20/20  0549    137 137   K 3.2* 3.6 3.6    105 105   CO2 30 31 29   BUN 16 17 16   CREA 0.88 0.98 0.94   GLU 91 183* 173*   CA 8.7 8.3* 8.0*   MG  --   --  2.0     Recent Labs     11/22/20 0153 11/21/20  0142 11/20/20  0549   AP 87 91 88   TP 6.4 6.3* 6.1*   ALB 2.9* 3.0* 2.9*   GLOB 3.5 3.3 3.2     Recent Labs     11/19/20  1649   INR 1.1   PTP 11.7*   APTT 26.3      No results for input(s): FE, TIBC, PSAT, FERR in the last 72 hours. No results for input(s): PH, PCO2, PO2 in the last 72 hours. No results for input(s): CPK, CKMB in the last 72 hours. No lab exists for component: TROPONINI  No components found for: GLPOC  No results found for: TSH, TSH2, TSH3, TSHP, TSHELE, TT3, T3U, T3UP, FRT3, FT3, FT4, FT4P, T4, T4P, FT4T, TT7, TSHEXT    HOSPITAL COURSE & TREATMENT RENDERED:   1. Pt was admitted with diabetic foot ulcer on R great toe. Osteomyelitis was ruled out. The wound was debrided at the bedside by Dr Agustin Bailey, who plans to see pt Monday for follow up care in the office.   Dr Reina Herman saw pt and recommended cefdinir and doxycycline for 4w oupt tx. The prelim wound culture grew GBS and staph areus. Hgb A1c was 9.1. Endo saw pt and recommended adjusting insulin while inpt. He will follow up with Dr Samson Braun as outpt. He was also treated for hypokalemia and needs to have his potassium rechecked this week at PCP. He was stable at the time of discharge. See today's progress note for further details.       Signed:  Ivonne Shannon MD  11/22/2020  9:40 AM .

## 2020-11-23 ENCOUNTER — PATIENT OUTREACH (OUTPATIENT)
Dept: CASE MANAGEMENT | Age: 55
End: 2020-11-23

## 2020-11-23 LAB
BACTERIA SPEC CULT: ABNORMAL
GRAM STN SPEC: ABNORMAL
SERVICE CMNT-IMP: ABNORMAL

## 2020-11-23 NOTE — PROGRESS NOTES
Patient contacted regarding recent discharge and COVID-19 risk. Discussed COVID-19 related testing which was not done at this time. Test results were not done. Patient informed of results, if available?     Care Transition Nurse/ Ambulatory Care Manager/ LPN Care Coordinator contacted the wife, Ratna Zarate by telephone to perform post discharge assessment. Verified name and  with wife as identifiers. Ratna Zarate reports patient is sleeping and that she will transport him to see Jerzy Adkins (podiatry) today at 1:15pm.    Patient has following risk factors of: diabetes. CTN reviewed discharge instructions, medical action plan and red flags related to discharge diagnosis. Reviewed and educated them on any new and changed medications related to discharge diagnosis. Patient picked up all prescripitons and is taking as directed. Education provided regarding infection prevention, and signs and symptoms of COVID-19 and when to seek medical attention with wife who verbalized understanding. Discussed exposure protocols and quarantine from [address] you at higher risk for severe illness  and given an opportunity for questions and concerns. The wife  agrees to contact the PCP office for questions related to their healthcare. CTN/ACM/LPN provided contact information for future reference. From CDC: Are you at higher risk for severe illness?  Wash your hands often.  Avoid close contact (6 feet, which is about two arm lengths) with people who are sick.  Put distance between yourself and other people if COVID-19 is spreading in your community.  Clean and disinfect frequently touched surfaces.  Avoid all cruise travel and non-essential air travel.  Call your healthcare professional if you have concerns about COVID-19 and your underlying condition or if you are sick.     For more information on steps you can take to protect yourself, see CDC's How to Protect Yourself      Patient/family/caregiver given information for GetWell Loop and agrees to enroll yes  Patient's preferred e-mail:  [email or web address]  Patient's preferred phone number: [phone]  Based on Loop alert triggers, patient will be contacted by nurse care manager for worsening symptoms. Pt will be further monitored by COVID Loop Team based on severity of symptoms and risk factors.

## 2020-11-25 LAB
BACTERIA SPEC CULT: NORMAL
SERVICE CMNT-IMP: NORMAL

## 2020-12-15 ENCOUNTER — HOSPITAL ENCOUNTER (EMERGENCY)
Age: 55
Discharge: HOME | End: 2020-12-15
Attending: STUDENT IN AN ORGANIZED HEALTH CARE EDUCATION/TRAINING PROGRAM
Payer: COMMERCIAL

## 2020-12-15 VITALS
SYSTOLIC BLOOD PRESSURE: 107 MMHG | OXYGEN SATURATION: 96 % | HEART RATE: 71 BPM | DIASTOLIC BLOOD PRESSURE: 70 MMHG | BODY MASS INDEX: 28.73 KG/M2 | TEMPERATURE: 98.5 F | WEIGHT: 206 LBS | RESPIRATION RATE: 18 BRPM

## 2020-12-15 DIAGNOSIS — F41.0 PANIC ATTACK: Primary | ICD-10-CM

## 2020-12-15 DIAGNOSIS — L97.509 DIABETIC FOOT ULCER ASSOCIATED WITH OTHER SPECIFIED DIABETES MELLITUS, UNSPECIFIED LATERALITY, UNSPECIFIED PART OF FOOT, UNSPECIFIED ULCER STAGE (HCC): ICD-10-CM

## 2020-12-15 DIAGNOSIS — E13.621 DIABETIC FOOT ULCER ASSOCIATED WITH OTHER SPECIFIED DIABETES MELLITUS, UNSPECIFIED LATERALITY, UNSPECIFIED PART OF FOOT, UNSPECIFIED ULCER STAGE (HCC): ICD-10-CM

## 2020-12-15 DIAGNOSIS — E87.6 HYPOKALEMIA: ICD-10-CM

## 2020-12-15 DIAGNOSIS — R07.9 CHEST PAIN, UNSPECIFIED TYPE: ICD-10-CM

## 2020-12-15 LAB
ALBUMIN SERPL-MCNC: 3.7 G/DL (ref 3.5–5)
ALBUMIN/GLOB SERPL: 1.2 {RATIO} (ref 1.1–2.2)
ALP SERPL-CCNC: 87 U/L (ref 45–117)
ALT SERPL-CCNC: 37 U/L (ref 12–78)
ANION GAP SERPL CALC-SCNC: 6 MMOL/L (ref 5–15)
AST SERPL-CCNC: 29 U/L (ref 15–37)
ATRIAL RATE: 69 BPM
BASOPHILS # BLD: 0.1 K/UL (ref 0–0.1)
BASOPHILS NFR BLD: 1 % (ref 0–1)
BILIRUB SERPL-MCNC: 0.7 MG/DL (ref 0.2–1)
BUN SERPL-MCNC: 24 MG/DL (ref 6–20)
BUN/CREAT SERPL: 25 (ref 12–20)
CALCIUM SERPL-MCNC: 8.7 MG/DL (ref 8.5–10.1)
CALCULATED P AXIS, ECG09: 16 DEGREES
CALCULATED R AXIS, ECG10: -90 DEGREES
CALCULATED T AXIS, ECG11: 24 DEGREES
CHLORIDE SERPL-SCNC: 104 MMOL/L (ref 97–108)
CO2 SERPL-SCNC: 27 MMOL/L (ref 21–32)
COMMENT, HOLDF: NORMAL
CREAT SERPL-MCNC: 0.95 MG/DL (ref 0.7–1.3)
DIAGNOSIS, 93000: NORMAL
DIFFERENTIAL METHOD BLD: NORMAL
EOSINOPHIL # BLD: 0.2 K/UL (ref 0–0.4)
EOSINOPHIL NFR BLD: 2 % (ref 0–7)
ERYTHROCYTE [DISTWIDTH] IN BLOOD BY AUTOMATED COUNT: 13 % (ref 11.5–14.5)
GLOBULIN SER CALC-MCNC: 3.1 G/DL (ref 2–4)
GLUCOSE SERPL-MCNC: 182 MG/DL (ref 65–100)
HCT VFR BLD AUTO: 38.9 % (ref 36.6–50.3)
HGB BLD-MCNC: 13.4 G/DL (ref 12.1–17)
IMM GRANULOCYTES # BLD AUTO: 0 K/UL (ref 0–0.04)
IMM GRANULOCYTES NFR BLD AUTO: 0 % (ref 0–0.5)
LYMPHOCYTES # BLD: 1.7 K/UL (ref 0.8–3.5)
LYMPHOCYTES NFR BLD: 21 % (ref 12–49)
MCH RBC QN AUTO: 28.4 PG (ref 26–34)
MCHC RBC AUTO-ENTMCNC: 34.4 G/DL (ref 30–36.5)
MCV RBC AUTO: 82.4 FL (ref 80–99)
MONOCYTES # BLD: 0.5 K/UL (ref 0–1)
MONOCYTES NFR BLD: 7 % (ref 5–13)
NEUTS SEG # BLD: 5.6 K/UL (ref 1.8–8)
NEUTS SEG NFR BLD: 69 % (ref 32–75)
NRBC # BLD: 0 K/UL (ref 0–0.01)
NRBC BLD-RTO: 0 PER 100 WBC
P-R INTERVAL, ECG05: 144 MS
PLATELET # BLD AUTO: 210 K/UL (ref 150–400)
PMV BLD AUTO: 9.6 FL (ref 8.9–12.9)
POTASSIUM SERPL-SCNC: 3.1 MMOL/L (ref 3.5–5.1)
PROT SERPL-MCNC: 6.8 G/DL (ref 6.4–8.2)
Q-T INTERVAL, ECG07: 464 MS
QRS DURATION, ECG06: 154 MS
QTC CALCULATION (BEZET), ECG08: 497 MS
RBC # BLD AUTO: 4.72 M/UL (ref 4.1–5.7)
SAMPLES BEING HELD,HOLD: NORMAL
SODIUM SERPL-SCNC: 137 MMOL/L (ref 136–145)
TROPONIN I SERPL-MCNC: <0.05 NG/ML
VENTRICULAR RATE, ECG03: 69 BPM
WBC # BLD AUTO: 8.1 K/UL (ref 4.1–11.1)

## 2020-12-15 PROCEDURE — 36415 COLL VENOUS BLD VENIPUNCTURE: CPT

## 2020-12-15 PROCEDURE — 84484 ASSAY OF TROPONIN QUANT: CPT

## 2020-12-15 PROCEDURE — 85025 COMPLETE CBC W/AUTO DIFF WBC: CPT

## 2020-12-15 PROCEDURE — 80053 COMPREHEN METABOLIC PANEL: CPT

## 2020-12-15 PROCEDURE — 74011250637 HC RX REV CODE- 250/637: Performed by: EMERGENCY MEDICINE

## 2020-12-15 PROCEDURE — 99284 EMERGENCY DEPT VISIT MOD MDM: CPT

## 2020-12-15 PROCEDURE — 93005 ELECTROCARDIOGRAM TRACING: CPT

## 2020-12-15 RX ORDER — POTASSIUM CHLORIDE 750 MG/1
40 TABLET, FILM COATED, EXTENDED RELEASE ORAL
Status: COMPLETED | OUTPATIENT
Start: 2020-12-15 | End: 2020-12-15

## 2020-12-15 RX ADMIN — POTASSIUM CHLORIDE 40 MEQ: 750 TABLET, FILM COATED, EXTENDED RELEASE ORAL at 07:59

## 2020-12-15 NOTE — ED TRIAGE NOTES
Pt. States that he is having chest pain and sob, states that he is not tolerating his cast well, states that he is feeling claustrophobic, burning sensation. Pt. States feels like he is having an anxiety attack as well, spoke to on-call doc and they recommended for him to come in with his hx.

## 2020-12-15 NOTE — ED PROVIDER NOTES
Patient is a 53-year-old male presenting the emergency department for panic attack. Patient had a cast placed on his left ankle yesterday afternoon and started to have \"claustrophobia\" from the cast being placed. Patient states that now when he thinks about his foot and ankle not being able to scratch it he is becoming very very anxious apprehensive of having the cast on his foot to the point where now his anxiety is causing him to have chest pain and shortness of breath. Patient states that if he does not think about it his symptoms go away but as he continues to start thinking about the cast being on his foot and ankle the symptoms return. Patient does have a past medical history of PE coronary artery disease chronic kidney disease as well as diabetes.            Past Medical History:   Diagnosis Date    BPH (benign prostatic hyperplasia)     CAD (coronary artery disease)     multivessel    CKD (chronic kidney disease), stage III (HCC)     DM type 2 causing neurological disease ([address]     DM type 2 causing renal disease ([address]     DM type 2 causing vascular disease [facility]     Dyslipidemia, goal LDL below 79     Essential hypertension, benign     History of acute inferior wall MI 5/30/2017    Lupus ([address]     Neuropathy     Pulmonary embolus ([address] 06/2018    Thoracic outlet syndrome        Past Surgical History:   Procedure Laterality Date    HX ORTHOPAEDIC  1996    L. knee arthroscopy         Family History:   Problem Relation Age of Onset    Heart Disease Mother     Stroke Mother     Diabetes Father     Stroke Father     Heart Disease Father        Social History     Socioeconomic History    Marital status:      Spouse name: Not on file    Number of children: Not on file    Years of education: Not on file    Highest education level: Not on file   Occupational History    Not on file   Social Needs    Financial resource strain: Not on file    Food insecurity     Worry: Not on file Inability: Not on file    Transportation needs     Medical: Not on file     Non-medical: Not on file   Tobacco Use    Smoking status: Never Smoker    Smokeless tobacco: Never Used   Substance and Sexual Activity    Alcohol use: No    Drug use: No    Sexual activity: Not on file   Lifestyle    Physical activity     Days per week: Not on file     Minutes per session: Not on file    Stress: Not on file   Relationships    Social connections     Talks on phone: Not on file     Gets together: Not on file     Attends Bahai service: Not on file     Active member of club or organization: Not on file     Attends meetings of clubs or organizations: Not on file     Relationship status: Not on file    Intimate partner violence     Fear of current or ex partner: Not on file     Emotionally abused: Not on file     Physically abused: Not on file     Forced sexual activity: Not on file   Other Topics Concern    Not on file   Social History Narrative    Not on file         ALLERGIES: Iodinated contrast media    Review of Systems   Respiratory: Positive for shortness of breath. Cardiovascular: Positive for chest pain. Psychiatric/Behavioral: The patient is nervous/anxious. All other systems reviewed and are negative. Vitals:    12/15/20 0622   BP: 133/77   Pulse: 68   Resp: 17   Temp: 98.5 °F (36.9 °C)   SpO2: 98%   Weight: 93.4 kg (206 lb)            Physical Exam  Vitals signs and nursing note reviewed. Constitutional:       Appearance: He is well-developed. HENT:      Head: Normocephalic and atraumatic. Eyes:      Extraocular Movements: Extraocular movements intact. Pupils: Pupils are equal, round, and reactive to light. Cardiovascular:      Rate and Rhythm: Normal rate and regular rhythm. Pulses: Normal pulses. Heart sounds: Normal heart sounds. Pulmonary:      Breath sounds: Normal breath sounds. Musculoskeletal: Normal range of motion.    Skin:     General: Skin is warm and dry.   Neurological:      General: No focal deficit present. Mental Status: He is alert and oriented to person, place, and time. Psychiatric:         Mood and Affect: Mood is anxious. MDM  Number of Diagnoses or Management Options  Diagnosis management comments: A/P: Panic attack/anxiety. 57-year-old male presenting with panic attack after having cast placed on his left foot and ankle causing patient to have some chest pain and shortness of breath. Due to patient's coronary artery disease history we will obtain a CBC, CMP, troponin, EKG. Patient determining whether or not he wants to keep the cast on his foot. Amount and/or Complexity of Data Reviewed  Clinical lab tests: ordered and reviewed    Risk of Complications, Morbidity, and/or Mortality  Presenting problems: low  Diagnostic procedures: low  Management options: low           Procedures      6:56 AM  Patient requesting to have cast removed we will remove the cast place patient back in Ortho shoe for stabilization. 6:58 AM  Change of shift. Care of patient signed over to Tomma Aus, DO.  Bedside handoff complete. Awaiting labs. 7:40 AM update  Troponin negative  Potassium slightly low  Repleted potassium  I checked on his foot and he has a clean dressing with a hard soled postop shoe  We discussed his chest pain he says that just having the cast removed has resolved any chest pain he had. He thinks it was related to that. He said he just does not do well with cast, especially in that area. He says he will follow-up with his doctor, who he thinks might be displeased by the cast removal.  I told him return to the emergency department should anything go wrong with his foot or if she were to have chest pain again or some other problem. He assured me he would.

## 2020-12-15 NOTE — DISCHARGE INSTRUCTIONS
Patient Education        Panic Attacks: Care Instructions  Your Care Instructions     During a panic attack, you may have a feeling of intense fear or terror, trouble breathing, chest pain or tightness, heartbeat changes, dizziness, sweating, and shaking. A panic attack starts suddenly and usually lasts from 5 to 20 minutes but may last even longer. You have the most anxiety about 10 minutes after the attack starts. An attack can begin with a stressful event, or it can happen without a cause. Although panic attacks can cause scary symptoms, you can learn to manage them with self-care, counseling, and medicine. Follow-up care is a key part of your treatment and safety. Be sure to make and go to all appointments, and call your doctor if you are having problems. It's also a good idea to know your test results and keep a list of the medicines you take. How can you care for yourself at home? · Take your medicine exactly as directed. Call your doctor if you think you are having a problem with your medicine. · Go to your counseling sessions and follow-up appointments. · Recognize and accept your anxiety. Then, when you are in a situation that makes you anxious, say to yourself, \"This is not an emergency. I feel uncomfortable, but I am not in danger. I can keep going even if I feel anxious. \"  · Be kind to your body:  ? Relieve tension with exercise or a massage. ? Get enough rest.  ? Avoid alcohol, caffeine, nicotine, and illegal drugs. They can increase your anxiety level, cause sleep problems, or trigger a panic attack. ? Learn and do relaxation techniques. See below for more about these techniques. · Engage your mind. Get out and do something you enjoy. Go to a funny movie, or take a walk or hike. Plan your day. Having too much or too little to do can make you anxious. · Keep a record of your symptoms.  Discuss your fears with a good friend or family member, or join a support group for people with similar problems. Talking to others sometimes relieves stress. · Get involved in social groups, or volunteer to help others. Being alone sometimes makes things seem worse than they are. · Get at least 30 minutes of exercise on most days of the week to relieve stress. Walking is a good choice. You also may want to do other activities, such as running, swimming, cycling, or playing tennis or team sports. Relaxation techniques  Do relaxation exercises for 10 to 20 minutes a day. You can play soothing, relaxing music while you do them, if you wish. · Tell others in your house that you are going to do your relaxation exercises. Ask them not to disturb you. · Find a comfortable place, away from all distractions and noise. · Lie down on your back, or sit with your back straight. · Focus on your breathing. Make it slow and steady. · Breathe in through your nose. Breathe out through either your nose or mouth. · Breathe deeply, filling up the area between your navel and your rib cage. Breathe so that your belly goes up and down. · Do not hold your breath. · Breathe like this for 5 to 10 minutes. Notice the feeling of calmness throughout your whole body. As you continue to breathe slowly and deeply, relax by doing the following for another 5 to 10 minutes:  · Tighten and relax each muscle group in your body. You can begin at your toes and work your way up to your head. · Imagine your muscle groups relaxing and becoming heavy. · Empty your mind of all thoughts. · Let yourself relax more and more deeply. · Become aware of the state of calmness that surrounds you. · When your relaxation time is over, you can bring yourself back to alertness by moving your fingers and toes and then your hands and feet and then stretching and moving your entire body. Sometimes people fall asleep during relaxation, but they usually wake up shortly afterward.   · Always give yourself time to return to full alertness before you drive a car or do anything that might cause an accident if you are not fully alert. Never play a relaxation tape while driving a car. When should you call for help? Call 911 anytime you think you may need emergency care. For example, call if:    · You feel you cannot stop from hurting yourself or someone else. Watch closely for changes in your health, and be sure to contact your doctor if:    · Your panic attacks get worse.     · You have new or different anxiety.     · You are not getting better as expected. Where can you learn more? Go to http://www.gray.com/  Enter H601 in the search box to learn more about \"Panic Attacks: Care Instructions. \"  Current as of: January 31, 2020               Content Version: 12.6  © 0442-5689 [employer]. Care instructions adapted under license by [employer] (which disclaims liability or warranty for this information). If you have questions about a medical condition or this instruction, always ask your healthcare professional. [place] [address] any warranty or liability for your use of this information.

## 2021-12-15 ENCOUNTER — HOSPITAL ENCOUNTER (OUTPATIENT)
Age: 56
Setting detail: OBSERVATION
Discharge: HOME | End: 2021-12-17
Attending: EMERGENCY MEDICINE | Admitting: INTERNAL MEDICINE
Payer: COMMERCIAL

## 2021-12-15 ENCOUNTER — APPOINTMENT (OUTPATIENT)
Dept: CT IMAGING | Age: 56
End: 2021-12-15
Attending: PHYSICIAN ASSISTANT
Payer: COMMERCIAL

## 2021-12-15 DIAGNOSIS — N17.9 AKI (ACUTE KIDNEY INJURY) (HCC): Primary | ICD-10-CM

## 2021-12-15 DIAGNOSIS — K52.9 GASTROENTERITIS, ACUTE: ICD-10-CM

## 2021-12-15 PROBLEM — D72.829 LEUKOCYTOSIS: Status: ACTIVE | Noted: 2021-12-15

## 2021-12-15 PROBLEM — I26.99 PULMONARY EMBOLISM (HCC): Status: RESOLVED | Noted: 2018-06-02 | Resolved: 2021-12-15

## 2021-12-15 PROBLEM — Z86.711 HX PULMONARY EMBOLISM: Status: ACTIVE | Noted: 2018-06-01

## 2021-12-15 PROBLEM — L08.9 TYPE 2 DIABETES MELLITUS WITH LEFT DIABETIC FOOT INFECTION (HCC): Status: RESOLVED | Noted: 2020-11-19 | Resolved: 2021-12-15

## 2021-12-15 PROBLEM — E11.628 TYPE 2 DIABETES MELLITUS WITH LEFT DIABETIC FOOT INFECTION (HCC): Status: RESOLVED | Noted: 2020-11-19 | Resolved: 2021-12-15

## 2021-12-15 PROBLEM — R11.2 NAUSEA & VOMITING: Status: ACTIVE | Noted: 2021-12-15

## 2021-12-15 PROBLEM — I69.392 FACIAL DROOP DUE TO OLD STROKE: Status: RESOLVED | Noted: 2018-06-02 | Resolved: 2021-12-15

## 2021-12-15 PROBLEM — I25.2 HISTORY OF ACUTE INFERIOR WALL MI: Status: RESOLVED | Noted: 2017-05-30 | Resolved: 2021-12-15

## 2021-12-15 LAB
ALBUMIN SERPL-MCNC: 3.6 G/DL (ref 3.5–5)
ALBUMIN/GLOB SERPL: 1 {RATIO} (ref 1.1–2.2)
ALP SERPL-CCNC: 114 U/L (ref 45–117)
ALT SERPL-CCNC: 20 U/L (ref 12–78)
AMPHET UR QL SCN: NEGATIVE
ANION GAP SERPL CALC-SCNC: 7 MMOL/L (ref 5–15)
APPEARANCE UR: ABNORMAL
AST SERPL-CCNC: 16 U/L (ref 15–37)
BACTERIA URNS QL MICRO: NEGATIVE /HPF
BARBITURATES UR QL SCN: NEGATIVE
BASOPHILS # BLD: 0 K/UL (ref 0–0.1)
BASOPHILS NFR BLD: 0 % (ref 0–1)
BENZODIAZ UR QL: NEGATIVE
BILIRUB SERPL-MCNC: 1.1 MG/DL (ref 0.2–1)
BILIRUB UR QL CFM: NEGATIVE
BUN SERPL-MCNC: 24 MG/DL (ref 6–20)
BUN/CREAT SERPL: 12 (ref 12–20)
CALCIUM SERPL-MCNC: 9.6 MG/DL (ref 8.5–10.1)
CANNABINOIDS UR QL SCN: NEGATIVE
CHLORIDE SERPL-SCNC: 101 MMOL/L (ref 97–108)
CO2 SERPL-SCNC: 29 MMOL/L (ref 21–32)
COCAINE UR QL SCN: NEGATIVE
COLOR UR: ABNORMAL
CREAT SERPL-MCNC: 1.97 MG/DL (ref 0.7–1.3)
DIFFERENTIAL METHOD BLD: ABNORMAL
DRUG SCRN COMMENT,DRGCM: NORMAL
EOSINOPHIL # BLD: 0.9 K/UL (ref 0–0.4)
EOSINOPHIL NFR BLD: 8 % (ref 0–7)
EPITH CASTS URNS QL MICRO: ABNORMAL /LPF
ERYTHROCYTE [DISTWIDTH] IN BLOOD BY AUTOMATED COUNT: 14 % (ref 11.5–14.5)
GLOBULIN SER CALC-MCNC: 3.5 G/DL (ref 2–4)
GLUCOSE BLD STRIP.AUTO-MCNC: 107 MG/DL (ref 65–117)
GLUCOSE SERPL-MCNC: 172 MG/DL (ref 65–100)
GLUCOSE UR STRIP.AUTO-MCNC: NEGATIVE MG/DL
HCT VFR BLD AUTO: 49.4 % (ref 36.6–50.3)
HGB BLD-MCNC: 16.7 G/DL (ref 12.1–17)
HGB UR QL STRIP: NEGATIVE
HYALINE CASTS URNS QL MICRO: ABNORMAL /LPF (ref 0–5)
IMM GRANULOCYTES # BLD AUTO: 0.1 K/UL (ref 0–0.04)
IMM GRANULOCYTES NFR BLD AUTO: 0 % (ref 0–0.5)
KETONES UR QL STRIP.AUTO: NEGATIVE MG/DL
LEUKOCYTE ESTERASE UR QL STRIP.AUTO: ABNORMAL
LIPASE SERPL-CCNC: 83 U/L (ref 73–393)
LYMPHOCYTES # BLD: 1.5 K/UL (ref 0.8–3.5)
LYMPHOCYTES NFR BLD: 12 % (ref 12–49)
MCH RBC QN AUTO: 28.6 PG (ref 26–34)
MCHC RBC AUTO-ENTMCNC: 33.8 G/DL (ref 30–36.5)
MCV RBC AUTO: 84.7 FL (ref 80–99)
METHADONE UR QL: NEGATIVE
MONOCYTES # BLD: 0.6 K/UL (ref 0–1)
MONOCYTES NFR BLD: 5 % (ref 5–13)
NEUTS SEG # BLD: 9.2 K/UL (ref 1.8–8)
NEUTS SEG NFR BLD: 75 % (ref 32–75)
NITRITE UR QL STRIP.AUTO: NEGATIVE
NRBC # BLD: 0 K/UL (ref 0–0.01)
NRBC BLD-RTO: 0 PER 100 WBC
OPIATES UR QL: NEGATIVE
PCP UR QL: NEGATIVE
PH UR STRIP: 5.5 [PH] (ref 5–8)
PLATELET # BLD AUTO: 313 K/UL (ref 150–400)
PMV BLD AUTO: 9.2 FL (ref 8.9–12.9)
POTASSIUM SERPL-SCNC: 4.1 MMOL/L (ref 3.5–5.1)
PROT SERPL-MCNC: 7.1 G/DL (ref 6.4–8.2)
PROT UR STRIP-MCNC: 300 MG/DL
RBC # BLD AUTO: 5.83 M/UL (ref 4.1–5.7)
RBC #/AREA URNS HPF: ABNORMAL /HPF (ref 0–5)
SERVICE CMNT-IMP: NORMAL
SODIUM SERPL-SCNC: 137 MMOL/L (ref 136–145)
SP GR UR REFRACTOMETRY: 1.02 (ref 1–1.03)
UR CULT HOLD, URHOLD: NORMAL
UR CULT HOLD, URHOLD: NORMAL
URATE CRY URNS QL MICRO: ABNORMAL
UROBILINOGEN UR QL STRIP.AUTO: 0.2 EU/DL (ref 0.2–1)
WBC # BLD AUTO: 12.4 K/UL (ref 4.1–11.1)
WBC URNS QL MICRO: ABNORMAL /HPF (ref 0–4)

## 2021-12-15 PROCEDURE — 74176 CT ABD & PELVIS W/O CONTRAST: CPT

## 2021-12-15 PROCEDURE — 0202U NFCT DS 22 TRGT SARS-COV-2: CPT

## 2021-12-15 PROCEDURE — 74011250637 HC RX REV CODE- 250/637: Performed by: INTERNAL MEDICINE

## 2021-12-15 PROCEDURE — 81001 URINALYSIS AUTO W/SCOPE: CPT

## 2021-12-15 PROCEDURE — 74011250636 HC RX REV CODE- 250/636: Performed by: INTERNAL MEDICINE

## 2021-12-15 PROCEDURE — 82962 GLUCOSE BLOOD TEST: CPT

## 2021-12-15 PROCEDURE — 85025 COMPLETE CBC W/AUTO DIFF WBC: CPT

## 2021-12-15 PROCEDURE — 74011250636 HC RX REV CODE- 250/636: Performed by: PHYSICIAN ASSISTANT

## 2021-12-15 PROCEDURE — G0378 HOSPITAL OBSERVATION PER HR: HCPCS

## 2021-12-15 PROCEDURE — 83690 ASSAY OF LIPASE: CPT

## 2021-12-15 PROCEDURE — 80307 DRUG TEST PRSMV CHEM ANLYZR: CPT

## 2021-12-15 PROCEDURE — 96374 THER/PROPH/DIAG INJ IV PUSH: CPT

## 2021-12-15 PROCEDURE — 99282 EMERGENCY DEPT VISIT SF MDM: CPT

## 2021-12-15 PROCEDURE — 80053 COMPREHEN METABOLIC PANEL: CPT

## 2021-12-15 PROCEDURE — 36415 COLL VENOUS BLD VENIPUNCTURE: CPT

## 2021-12-15 RX ORDER — ACETAMINOPHEN 650 MG/1
650 SUPPOSITORY RECTAL
Status: DISCONTINUED | OUTPATIENT
Start: 2021-12-15 | End: 2021-12-15

## 2021-12-15 RX ORDER — METOPROLOL SUCCINATE 50 MG/1
100 TABLET, EXTENDED RELEASE ORAL DAILY
Status: DISCONTINUED | OUTPATIENT
Start: 2021-12-16 | End: 2021-12-17 | Stop reason: HOSPADM

## 2021-12-15 RX ORDER — DULAGLUTIDE 1.5 MG/.5ML
1.5 INJECTION, SOLUTION SUBCUTANEOUS
COMMUNITY
End: 2021-12-17

## 2021-12-15 RX ORDER — SODIUM CHLORIDE 9 MG/ML
75 INJECTION, SOLUTION INTRAVENOUS CONTINUOUS
Status: DISCONTINUED | OUTPATIENT
Start: 2021-12-15 | End: 2021-12-17 | Stop reason: HOSPADM

## 2021-12-15 RX ORDER — ENOXAPARIN SODIUM 100 MG/ML
40 INJECTION SUBCUTANEOUS DAILY
Status: DISCONTINUED | OUTPATIENT
Start: 2021-12-16 | End: 2021-12-17 | Stop reason: HOSPADM

## 2021-12-15 RX ORDER — INSULIN LISPRO 100 [IU]/ML
INJECTION, SOLUTION INTRAVENOUS; SUBCUTANEOUS
COMMUNITY

## 2021-12-15 RX ORDER — POLYETHYLENE GLYCOL 3350 17 G/17G
17 POWDER, FOR SOLUTION ORAL DAILY PRN
Status: DISCONTINUED | OUTPATIENT
Start: 2021-12-15 | End: 2021-12-17 | Stop reason: HOSPADM

## 2021-12-15 RX ORDER — WHEAT DEXTRIN 3 G/3.5 G
3 POWDER IN PACKET (EA) ORAL EVERY EVENING
COMMUNITY

## 2021-12-15 RX ORDER — INSULIN LISPRO 100 [IU]/ML
INJECTION, SOLUTION INTRAVENOUS; SUBCUTANEOUS
Status: DISCONTINUED | OUTPATIENT
Start: 2021-12-15 | End: 2021-12-17 | Stop reason: HOSPADM

## 2021-12-15 RX ORDER — CLOPIDOGREL BISULFATE 75 MG/1
75 TABLET ORAL DAILY
Status: DISCONTINUED | OUTPATIENT
Start: 2021-12-16 | End: 2021-12-17 | Stop reason: HOSPADM

## 2021-12-15 RX ORDER — CLONIDINE HYDROCHLORIDE 0.3 MG/1
0.3 TABLET ORAL 3 TIMES DAILY
COMMUNITY

## 2021-12-15 RX ORDER — MIRTAZAPINE 15 MG/1
15 TABLET, FILM COATED ORAL
Status: DISCONTINUED | OUTPATIENT
Start: 2021-12-15 | End: 2021-12-17 | Stop reason: HOSPADM

## 2021-12-15 RX ORDER — CLONIDINE HYDROCHLORIDE 0.1 MG/1
0.3 TABLET ORAL 3 TIMES DAILY
Status: DISCONTINUED | OUTPATIENT
Start: 2021-12-15 | End: 2021-12-17 | Stop reason: HOSPADM

## 2021-12-15 RX ORDER — CALCIUM CARBONATE 260MG(650)
200 TABLET,CHEWABLE ORAL DAILY
COMMUNITY

## 2021-12-15 RX ORDER — TAMSULOSIN HYDROCHLORIDE 0.4 MG/1
0.4 CAPSULE ORAL EVERY EVENING
Status: DISCONTINUED | OUTPATIENT
Start: 2021-12-15 | End: 2021-12-17 | Stop reason: HOSPADM

## 2021-12-15 RX ORDER — MAGNESIUM SULFATE 100 %
4 CRYSTALS MISCELLANEOUS AS NEEDED
Status: DISCONTINUED | OUTPATIENT
Start: 2021-12-15 | End: 2021-12-17 | Stop reason: HOSPADM

## 2021-12-15 RX ORDER — ONDANSETRON 4 MG/1
4 TABLET, ORALLY DISINTEGRATING ORAL
Status: DISCONTINUED | OUTPATIENT
Start: 2021-12-15 | End: 2021-12-15

## 2021-12-15 RX ORDER — DIAPER,BRIEF,INFANT-TODD,DISP
10000 EACH MISCELLANEOUS DAILY
COMMUNITY

## 2021-12-15 RX ORDER — ONDANSETRON 2 MG/ML
4 INJECTION INTRAMUSCULAR; INTRAVENOUS
Status: COMPLETED | OUTPATIENT
Start: 2021-12-15 | End: 2021-12-15

## 2021-12-15 RX ORDER — ACETAMINOPHEN 500 MG
1000 TABLET ORAL
COMMUNITY

## 2021-12-15 RX ORDER — METFORMIN HYDROCHLORIDE 500 MG/1
1000 TABLET, EXTENDED RELEASE ORAL 2 TIMES DAILY
Status: DISCONTINUED | OUTPATIENT
Start: 2021-12-16 | End: 2021-12-17 | Stop reason: HOSPADM

## 2021-12-15 RX ORDER — PREGABALIN 25 MG/1
50 CAPSULE ORAL 2 TIMES DAILY
Status: DISCONTINUED | OUTPATIENT
Start: 2021-12-15 | End: 2021-12-15

## 2021-12-15 RX ORDER — CLONIDINE HYDROCHLORIDE 0.1 MG/1
0.4 TABLET ORAL 2 TIMES DAILY
Status: DISCONTINUED | OUTPATIENT
Start: 2021-12-15 | End: 2021-12-15

## 2021-12-15 RX ORDER — ACETAMINOPHEN 325 MG/1
650 TABLET ORAL
Status: DISCONTINUED | OUTPATIENT
Start: 2021-12-15 | End: 2021-12-17 | Stop reason: HOSPADM

## 2021-12-15 RX ORDER — AMLODIPINE BESYLATE 5 MG/1
10 TABLET ORAL EVERY EVENING
Status: DISCONTINUED | OUTPATIENT
Start: 2021-12-15 | End: 2021-12-17 | Stop reason: HOSPADM

## 2021-12-15 RX ORDER — ASPIRIN 81 MG/1
81 TABLET ORAL DAILY
Status: DISCONTINUED | OUTPATIENT
Start: 2021-12-16 | End: 2021-12-17 | Stop reason: HOSPADM

## 2021-12-15 RX ORDER — DEXTROSE 50 % IN WATER (D50W) INTRAVENOUS SYRINGE
25-50 AS NEEDED
Status: DISCONTINUED | OUTPATIENT
Start: 2021-12-15 | End: 2021-12-17 | Stop reason: HOSPADM

## 2021-12-15 RX ORDER — ONDANSETRON 2 MG/ML
4 INJECTION INTRAMUSCULAR; INTRAVENOUS
Status: DISCONTINUED | OUTPATIENT
Start: 2021-12-15 | End: 2021-12-17 | Stop reason: HOSPADM

## 2021-12-15 RX ORDER — FLUOXETINE HYDROCHLORIDE 20 MG/1
20 CAPSULE ORAL DAILY
Status: DISCONTINUED | OUTPATIENT
Start: 2021-12-16 | End: 2021-12-17 | Stop reason: HOSPADM

## 2021-12-15 RX ORDER — OLMESARTAN MEDOXOMIL 40 MG/1
40 TABLET ORAL DAILY
COMMUNITY

## 2021-12-15 RX ORDER — ATORVASTATIN CALCIUM 20 MG/1
80 TABLET, FILM COATED ORAL EVERY EVENING
Status: DISCONTINUED | OUTPATIENT
Start: 2021-12-15 | End: 2021-12-17 | Stop reason: HOSPADM

## 2021-12-15 RX ORDER — CHOLECALCIFEROL TAB 125 MCG (5000 UNIT) 125 MCG
5000 TAB ORAL DAILY
COMMUNITY

## 2021-12-15 RX ADMIN — TAMSULOSIN HYDROCHLORIDE 0.4 MG: 0.4 CAPSULE ORAL at 22:44

## 2021-12-15 RX ADMIN — ATORVASTATIN CALCIUM 80 MG: 20 TABLET, FILM COATED ORAL at 22:51

## 2021-12-15 RX ADMIN — CLONIDINE HYDROCHLORIDE 0.3 MG: 0.1 TABLET ORAL at 22:44

## 2021-12-15 RX ADMIN — MIRTAZAPINE 15 MG: 15 TABLET, FILM COATED ORAL at 22:44

## 2021-12-15 RX ADMIN — SODIUM CHLORIDE 1000 ML: 9 INJECTION, SOLUTION INTRAVENOUS at 13:55

## 2021-12-15 RX ADMIN — ONDANSETRON 4 MG: 2 INJECTION INTRAMUSCULAR; INTRAVENOUS at 13:56

## 2021-12-15 RX ADMIN — AMLODIPINE BESYLATE 10 MG: 5 TABLET ORAL at 22:44

## 2021-12-15 RX ADMIN — SODIUM CHLORIDE 75 ML/HR: 9 INJECTION, SOLUTION INTRAVENOUS at 20:29

## 2021-12-15 NOTE — H&P
[facility]    Hospitalist Admission Note      NAME:  Jovana Suero   :   1965   MRN:  [id number]     PCP:  Hernan Zacarias MD     Date/Time of service:  12/15/2021 3:57 PM          Subjective:     CHIEF COMPLAINT: N/V/D     HISTORY OF PRESENT ILLNESS:     Mr. Felipe Ward is a 60 y.o.  male who presented to the Emergency Department complaining of N/V/D. Going on for a few days. Mild pain. No fever. No sick contacts. No bleeding. ER finds ANDRE on one of 2 BMP. CT unremarkable. He is not septic. We will admit him for observation. Past Medical History:   Diagnosis Date    ADHD     Anxiety and depression     Bifascicular block     BPH (benign prostatic hyperplasia)     CAD (coronary artery disease)     multivessel    CKD (chronic kidney disease), stage III (HCC)     DM type 2 causing neurological disease ([facility]     DM type 2 causing renal disease ([facility]     DM type 2 causing vascular disease (HCC)     Dyslipidemia     HTN (hypertension)     Hx pulmonary embolism 2018    Insomnia     Lupus ([facility]     Neuropathy     Thoracic outlet syndrome         Past Surgical History:   Procedure Laterality Date    HX ORTHOPAEDIC      L. knee arthroscopy       Social History     Tobacco Use    Smoking status: Never Smoker    Smokeless tobacco: Never Used   Substance Use Topics    Alcohol use: No        Family History   Problem Relation Age of Onset    Heart Disease Mother     Stroke Mother     Diabetes Father     Stroke Father     Heart Disease Father      Allergies   Allergen Reactions    Iodinated Contrast Media Anaphylaxis        Prior to Admission medications    Medication Sig Start Date End Date Taking? Authorizing Provider   amLODIPine (NORVASC) 10 mg tablet Take 10 mg by mouth daily. Provider, Historical   chlorthalidone (HYGROTON) 25 mg tablet Take 25 mg by mouth daily.     Provider, Historical   pregabalin (Lyrica) 50 mg capsule Take 50 mg by mouth two (2) times a day.    Provider, Historical   metFORMIN (GLUMETZA ER) 500 mg TG24 24 hour tablet Take 1,000 mg by mouth two (2) times a day. Provider, Historical   aspirin delayed-release 81 mg tablet Take 1 Tab by mouth daily. 11/14/19   Kenroy Juarez MD   atorvastatin (LIPITOR) 80 mg tablet Take 80 mg by mouth daily. Provider, Historical   lisdexamfetamine (VYVANSE) 30 mg capsule Take 30 mg by mouth as needed. Provider, Historical   FLUoxetine (PROZAC) 10 mg tablet Take 10 mg by mouth daily. Provider, Historical   olmesartan (BENICAR) 20 mg tablet Take 20 mg by mouth daily. 8/30/18   Provider, Historical   cloNIDine HCl (CATAPRES) 0.2 mg tablet Take 0.4 mg by mouth two (2) times a day. 9/27/18   Provider, Historical   metoprolol succinate (TOPROL-XL) 100 mg tablet Take 100 mg by mouth daily. Other, MD Emy   mirtazapine (REMERON) 15 mg tablet Take 15 mg by mouth nightly. Other, MD Emy   insulin lispro protamine/insulin lispro (HUMALOG MIX 75-25) 100 unit/mL (75-25) injection 35-75 Units by SubCUTAneous route two (2) times a day. Based on BG    Provider, Historical   cyclobenzaprine (FLEXERIL) 10 mg tablet Take 10 mg by mouth nightly. 11/30/16   Provider, Historical   clopidogrel (PLAVIX) 75 mg tablet Take 1 Tab by mouth daily. 5/7/16   Elizabeth Chung MD   tamsulosin (FLOMAX) 0.4 mg capsule Take 0.4 mg by mouth every evening. Provider, Historical   traZODone (DESYREL) 50 mg tablet Take 50 mg by mouth as needed.     Provider, Historical       Review of Systems:  (bold if positive, if negative)    Gen:  Eyes:  ENT:  CVS:  Pulm:  GI:  Abdominal pain, nausea, emesis, diarrheaGU:  MS:  Skin:  Psych:  Endo:  Hem:  Renal:  Neuro:        Objective:      VITALS:    Vital signs reviewed; most recent are:    Visit Vitals  /85 (BP 1 Location: Right upper arm, BP Patient Position: Sitting)   Pulse 85   Temp 96.9 °F (36.1 °C)   Resp 16   Ht 5' 11\" (1.803 m)   Wt 87.1 kg (192 lb)   SpO2 99%   BMI 26.78 kg/m²     SpO2 Readings from Last 6 Encounters:   12/15/21 99%   12/15/20 96%   11/22/20 96%   11/14/19 99%   04/16/19 96%   10/16/18 98%        No intake or output data in the 24 hours ending 12/15/21 0217     Exam:     Physical Exam:    Gen:  Well-developed, well-nourished, in no acute distress  HEENT:  Pink conjunctivae, PERRL, hearing intact to voice, moist mucous membranes  Neck:  Supple, without masses, thyroid non-tender  Resp:  No accessory muscle use, clear breath sounds without wheezes rales or rhonchi  Card:  No murmurs, normal S1, S2 without thrills, bruits or peripheral edema  Abd:  Soft, non-tender, non-distended, normoactive bowel sounds are present, no mass  Lymph:  No cervical or inguinal adenopathy  Musc:  No cyanosis or clubbing  Skin:  No rashes or ulcers, skin turgor is good  Neuro:  Cranial nerves are grossly intact, no focal motor weakness, follows commands appropriately  Psych:  Good insight, oriented to person, place and time, alert     Labs:    Recent Labs     12/15/21  1344   WBC 12.4*   HGB 16.7   HCT 49.4        Recent Labs     12/15/21  1344      K 4.1      CO2 29   *   BUN 24*   CREA 1.97*   CA 9.6   ALB 3.6   TBILI 1.1*   ALT 20     Lab Results   Component Value Date/Time    Glucose (POC) 78 11/22/2020 06:34 AM    Glucose (POC) 143 (H) 11/21/2020 09:10 PM     No results for input(s): PH, PCO2, PO2, HCO3, FIO2 in the last 72 hours. No results for input(s): INR, INREXT in the last 72 hours.   All Micro Results     Procedure Component Value Units Date/Time    RESPIRATORY VIRUS PANEL W/COVID-19, PCR [[id number]]     Order Status: Sent Specimen: NASOPHARYNGEAL SWAB     ENTERIC BACTERIA PANEL, DNA [[id number]]     Order Status: Sent Specimen: Stool     C. DIFFICILE AG & TOXIN A/B [[id number]]     Order Status: Sent Specimen: Stool     URINE CULTURE HOLD SAMPLE [[id number]] Collected: 12/15/21 1344    Order Status: Completed Specimen: Urine from Serum Updated: 12/15/21 1354     Urine culture hold       Urine on hold in Microbiology dept for 2 days. If unpreserved urine is submitted, it cannot be used for addtional testing after 24 hours, recollection will be required. I have reviewed previous records       Assessment and Plan:      Nausea & vomiting / Gastroenteritis / Leukocytosis - Likely viral vs food. No sign of bacterial issue based on labs, vitals or CT. No Abx. Clear diet. ADAT. Prn zofran. Sent stool studies and RVP. ANDRE (acute kidney injury) on CKD (chronic kidney disease), stage III  - ANDRE POA, perhaps. Oddly the ANDRE was only noted on BMP, a few hours after a normal BMP, and after IVF. I suspect one of the BMP is in error. Monitor. Hydrate. Hold chlorthalidone and olmesartan    DM type 2 causing vascular, renal and neurological disease - Diabetic diet when eating. SSI per protocol. Continue home metformin unless Cr worsens. Hold mixed insulin until eating well. Check A1c.      CAD (coronary artery disease) / HTN (hypertension) / Bifascicular block - Appears stable. Continue Norvasc, ASA, plavix, clonidine, metoprolol, but hold chlorthalidone and olmesartan    Depression with anxiety / ADHD - continue mirtazapine, fluoxetine and vyvance    Hx pulmonary embolism - Not on anticoagulation. Check DDimer    BPH (benign prostatic hyperplasia) - Continue flomax    Dyslipidemia - Continue atorvastatin    Neuropathy - Continue lyrica    Lupus - Not on meds. Dx per chart. Thoracic outlet syndrome - Monitor. Hx Cerebral vascular disease - Continue ASA, statin    Telemetry reviewed:   normal sinus rhythm    Risk of deterioration: high      Total time spent with patient: 48 Minutes I personally reviewed chart, notes, data and current medications in the medical record. I have personally examined and treated the patient at bedside during this period.                  Care Plan discussed with: Patient, Nursing Staff and >50% of time spent in counseling and coordination of care    Discussed:  Care Plan and D/C Planning       ___________________________________________________    Attending Physician: Mariama Osei MD

## 2021-12-15 NOTE — ED PROVIDER NOTES
59yo male with PMH of CAD, MI s/p stent x2 in 2013, CKD, T2DM, HTN, PE in 2018, thoracic outlet syndrome, CVA in 2016 who presents ambulatory for intractable vomiting, diarrhea x 7 days since 12/08 with associated nausea, epigastric / upper abd pain, and a 10 pound weight loss in the past 7 days. He states his vomiting generally occurs after eating, endorses his last oral intake was last night, crackers and ice cream which he later vomited. He vomited again this morning at 10 AM.  He states he overall feels generalized malaise. No history of pancreatitis. He does not drink or smoke. He did a telehealth visit with Dr. Jaison Rivera with [facility] 5 days ago and was recommended to start milk of magnesia for 3 days which he endorses taking, and then to start Benefiber, laxative any stool softener which he states has only exacerbated his diarrhea. He denies black or bloody stool. He does take Plavix. Spouse has simliar GI sx's around the same time which self-resolved. He denies recent travel or recent abx use. He is not currently taking a blood thinner. Last colonoscopy was 8 years ago, he is scheduled for another colonoscopy on 2/7.     GI: Syed Fletcher, [facility]           Past Medical History:   Diagnosis Date    BPH (benign prostatic hyperplasia)     CAD (coronary artery disease)     multivessel    CKD (chronic kidney disease), stage III ([address]     DM type 2 causing neurological disease ([address]     DM type 2 causing renal disease ([address]     DM type 2 causing vascular disease (HCC)     Dyslipidemia, goal LDL below 70     Essential hypertension, benign     History of acute inferior wall MI 5/30/2017    Lupus ([address]     Neuropathy     Pulmonary embolus ([address] 06/2018    Thoracic outlet syndrome        Past Surgical History:   Procedure Laterality Date    HX ORTHOPAEDIC  1996    L. knee arthroscopy         Family History:   Problem Relation Age of Onset    Heart Disease Mother     Stroke Mother  Diabetes Father     Stroke Father     Heart Disease Father        Social History     Socioeconomic History    Marital status:      Spouse name: Not on file    Number of children: Not on file    Years of education: Not on file    Highest education level: Not on file   Occupational History    Not on file   Tobacco Use    Smoking status: Never Smoker    Smokeless tobacco: Never Used   Substance and Sexual Activity    Alcohol use: No    Drug use: No    Sexual activity: Not on file   Other Topics Concern    Not on file   Social History Narrative    Not on file     Social Determinants of Health     Financial Resource Strain:     Difficulty of Paying Living Expenses: Not on file   Food Insecurity:     Worried About Running Out of Food in the Last Year: Not on file    Moshe of Food in the Last Year: Not on file   Transportation Needs:     Lack of Transportation (Medical): Not on file    Lack of Transportation (Non-Medical):  Not on file   Physical Activity:     Days of Exercise per Week: Not on file    Minutes of Exercise per Session: Not on file   Stress:     Feeling of Stress : Not on file   Social Connections:     Frequency of Communication with Friends and Family: Not on file    Frequency of Social Gatherings with Friends and Family: Not on file    Attends Jainism Services: Not on file    Active Member of [address] [place] [address] [employer] or Organizations: Not on file    Attends Club or Organization Meetings: Not on file    Marital Status: Not on file   Intimate Partner Violence:     Fear of Current or Ex-Partner: Not on file    Emotionally Abused: Not on file    Physically Abused: Not on file    Sexually Abused: Not on file   Housing Stability:     Unable to Pay for Housing in the Last Year: Not on file    Number of [place] in the Last Year: Not on file    Unstable Housing in the Last Year: Not on file         ALLERGIES: Iodinated contrast media    Review of Systems   Constitutional: Positive for activity change, appetite change and unexpected weight change. Negative for chills, fatigue and fever. HENT: Negative for trouble swallowing. Respiratory: Negative for cough, chest tightness, shortness of breath and wheezing. Cardiovascular: Negative. Negative for chest pain and palpitations. Gastrointestinal: Positive for abdominal distention, abdominal pain, diarrhea, nausea and vomiting. Negative for blood in stool. Genitourinary: Negative. Negative for dysuria, flank pain, frequency and hematuria. Musculoskeletal: Negative. Negative for arthralgias, back pain, neck pain and neck stiffness. Skin: Negative. Negative for color change and rash. Neurological: Negative. Negative for dizziness, numbness and headaches. All other systems reviewed and are negative. Vitals:    12/15/21 1215   BP: 124/85   Pulse: 85   Resp: 16   Temp: 96.9 °F (36.1 °C)   SpO2: 99%   Weight: 87.1 kg (192 lb)   Height: 5' 11\" (1.803 m)            Physical Exam  Vitals and nursing note reviewed. Constitutional:       General: He is not in acute distress. Appearance: He is well-developed. He is not toxic-appearing or diaphoretic. HENT:      Head: Normocephalic and atraumatic. Eyes:      General:         Right eye: No discharge. Left eye: No discharge. Conjunctiva/sclera: Conjunctivae normal.      Pupils: Pupils are equal, round, and reactive to light. Neck:      Trachea: No tracheal tenderness. Cardiovascular:      Rate and Rhythm: Normal rate and regular rhythm. Pulses: Normal pulses. Heart sounds: Normal heart sounds. No murmur heard. No friction rub. No gallop. Pulmonary:      Effort: Pulmonary effort is normal. No respiratory distress. Breath sounds: Normal breath sounds. No wheezing or rales. Chest:      Chest wall: No tenderness. Abdominal:      General: Bowel sounds are normal. There is no distension. Palpations: Abdomen is soft.       Tenderness: There is abdominal tenderness in the right upper quadrant, epigastric area and left upper quadrant. There is no guarding or rebound. Musculoskeletal:         General: No tenderness. Normal range of motion. Cervical back: Full passive range of motion without pain and normal range of motion. Skin:     General: Skin is warm and dry. Capillary Refill: Capillary refill takes less than 2 seconds. Findings: No abrasion, erythema or rash. Neurological:      Mental Status: He is alert and oriented to person, place, and time. Cranial Nerves: No cranial nerve deficit. Sensory: No sensory deficit. Coordination: Coordination normal.   Psychiatric:         Speech: Speech normal.         Behavior: Behavior normal.          MDM  Number of Diagnoses or Management Options  ANDRE (acute kidney injury) ([facility]  Gastroenteritis, acute  Diagnosis management comments:   Ddx: gastroenteritis, SBO, pancreatitis, intra-abd mass, cholecystitis       Amount and/or Complexity of Data Reviewed  Clinical lab tests: reviewed and ordered  Tests in the radiology section of CPT®: ordered and reviewed  Review and summarize past medical records: yes  Discuss the patient with other providers: yes    Patient Progress  Patient progress: stable         Procedures    Perfect Serve Consult for Admission  3:43 PM    ED Room Number: CW/CW  Patient Name and age:  Agustín Sanabria 60 y.o.  male  Working Diagnosis:   1. ANDRE (acute kidney injury) ([facility] [address]    2. Gastroenteritis, acute        COVID-19 Suspicion:  no  Sepsis present:  no  Reassessment needed: N/A  Code Status:  Full Code  Readmission: no  Isolation Requirements:  yes, enteric  Recommended Level of Care:  med/surg  Department:[facility] ED - [phone]  Other:  Vomiting and diarrhea x 1 week. Today Creat 1.97, baseline 0.9. CT abd/pelvis with no acute findings. Stool studies ordered. 3:46 PM  Patient has been re-examined and states he still feeling dehydrated. Creatinine baseline 0.9, today 1.9. CT abdomen/pelvis with no acute findings, incidental findings were discussed with patient. Offered admission based on ANDRE, still nauseated and extensive vomiting with #10 weight loss in the past week. He would like to stay for further hydration. Patient is being admitted to the hospital.  The results of their tests and reasons for their admission have been discussed with them and/or available family. They convey agreement and understanding for the need to be admitted and for their admission diagnosis. Consultation has been made with the inpatient physician specialist for hospitalization.

## 2021-12-15 NOTE — ED TRIAGE NOTES
Pt states he has had left sided abdominal pain since 12/8. Reports that he has had no solid bowel movements, all \"watery\". Also reports vomiting and has lost 10 pounds in the last week. GI doctor told patient to drink milk of magnesia to \"flush system out\"    Denies any fevers and chest pain. PMH of stroke, MI, PE, diabetes, and HTN.

## 2021-12-16 LAB
ALBUMIN SERPL-MCNC: 3.3 G/DL (ref 3.5–5)
ALBUMIN/GLOB SERPL: 1.1 {RATIO} (ref 1.1–2.2)
ALP SERPL-CCNC: 123 U/L (ref 45–117)
ALT SERPL-CCNC: 15 U/L (ref 12–78)
ANION GAP SERPL CALC-SCNC: 6 MMOL/L (ref 5–15)
AST SERPL-CCNC: 16 U/L (ref 15–37)
B PERT DNA SPEC QL NAA+PROBE: NOT DETECTED
BILIRUB SERPL-MCNC: 1.1 MG/DL (ref 0.2–1)
BORDETELLA PARAPERTUSSIS PCR, BORPAR: NOT DETECTED
BUN SERPL-MCNC: 22 MG/DL (ref 6–20)
BUN/CREAT SERPL: 16 (ref 12–20)
C DIFF GDH STL QL: NEGATIVE
C DIFF TOX A+B STL QL IA: NEGATIVE
C PNEUM DNA SPEC QL NAA+PROBE: NOT DETECTED
CALCIUM SERPL-MCNC: 8.8 MG/DL (ref 8.5–10.1)
CAMPYLOBACTER SPECIES, DNA: NEGATIVE
CHLORIDE SERPL-SCNC: 105 MMOL/L (ref 97–108)
CO2 SERPL-SCNC: 28 MMOL/L (ref 21–32)
CREAT SERPL-MCNC: 1.35 MG/DL (ref 0.7–1.3)
CRP SERPL-MCNC: 1.15 MG/DL (ref 0–0.6)
D DIMER PPP FEU-MCNC: 0.39 MG/L FEU (ref 0–0.65)
ENTEROTOXIGEN E COLI, DNA: NEGATIVE
ERYTHROCYTE [DISTWIDTH] IN BLOOD BY AUTOMATED COUNT: 13.9 % (ref 11.5–14.5)
EST. AVERAGE GLUCOSE BLD GHB EST-MCNC: 183 MG/DL
FLUAV H1 2009 PAND RNA SPEC QL NAA+PROBE: NOT DETECTED
FLUAV H1 RNA SPEC QL NAA+PROBE: NOT DETECTED
FLUAV H3 RNA SPEC QL NAA+PROBE: NOT DETECTED
FLUAV SUBTYP SPEC NAA+PROBE: NOT DETECTED
FLUBV RNA SPEC QL NAA+PROBE: NOT DETECTED
GLOBULIN SER CALC-MCNC: 3 G/DL (ref 2–4)
GLUCOSE BLD STRIP.AUTO-MCNC: 135 MG/DL (ref 65–117)
GLUCOSE BLD STRIP.AUTO-MCNC: 138 MG/DL (ref 65–117)
GLUCOSE BLD STRIP.AUTO-MCNC: 204 MG/DL (ref 65–117)
GLUCOSE BLD STRIP.AUTO-MCNC: 76 MG/DL (ref 65–117)
GLUCOSE SERPL-MCNC: 127 MG/DL (ref 65–100)
HADV DNA SPEC QL NAA+PROBE: NOT DETECTED
HBA1C MFR BLD: 8 % (ref 4–5.6)
HCOV 229E RNA SPEC QL NAA+PROBE: NOT DETECTED
HCOV HKU1 RNA SPEC QL NAA+PROBE: NOT DETECTED
HCOV NL63 RNA SPEC QL NAA+PROBE: NOT DETECTED
HCOV OC43 RNA SPEC QL NAA+PROBE: NOT DETECTED
HCT VFR BLD AUTO: 45.5 % (ref 36.6–50.3)
HGB BLD-MCNC: 15 G/DL (ref 12.1–17)
HMPV RNA SPEC QL NAA+PROBE: NOT DETECTED
HPIV1 RNA SPEC QL NAA+PROBE: NOT DETECTED
HPIV2 RNA SPEC QL NAA+PROBE: NOT DETECTED
HPIV3 RNA SPEC QL NAA+PROBE: NOT DETECTED
HPIV4 RNA SPEC QL NAA+PROBE: NOT DETECTED
INTERPRETATION: NORMAL
M PNEUMO DNA SPEC QL NAA+PROBE: NOT DETECTED
MAGNESIUM SERPL-MCNC: 2.5 MG/DL (ref 1.6–2.4)
MCH RBC QN AUTO: 28.3 PG (ref 26–34)
MCHC RBC AUTO-ENTMCNC: 33 G/DL (ref 30–36.5)
MCV RBC AUTO: 85.8 FL (ref 80–99)
NRBC # BLD: 0 K/UL (ref 0–0.01)
NRBC BLD-RTO: 0 PER 100 WBC
P SHIGELLOIDES DNA STL QL NAA+PROBE: NEGATIVE
PLATELET # BLD AUTO: 221 K/UL (ref 150–400)
PMV BLD AUTO: 9.5 FL (ref 8.9–12.9)
POTASSIUM SERPL-SCNC: 3.7 MMOL/L (ref 3.5–5.1)
PROCALCITONIN SERPL-MCNC: 0.05 NG/ML
PROT SERPL-MCNC: 6.3 G/DL (ref 6.4–8.2)
RBC # BLD AUTO: 5.3 M/UL (ref 4.1–5.7)
RSV RNA SPEC QL NAA+PROBE: NOT DETECTED
RV+EV RNA SPEC QL NAA+PROBE: NOT DETECTED
SALMONELLA SPECIES, DNA: NEGATIVE
SARS-COV-2 PCR, COVPCR: NOT DETECTED
SERVICE CMNT-IMP: ABNORMAL
SERVICE CMNT-IMP: NORMAL
SHIGA TOXIN PRODUCING, DNA: NEGATIVE
SHIGELLA SP+EIEC IPAH STL QL NAA+PROBE: NEGATIVE
SODIUM SERPL-SCNC: 139 MMOL/L (ref 136–145)
VIBRIO SPECIES, DNA: NEGATIVE
WBC # BLD AUTO: 9 K/UL (ref 4.1–11.1)
Y. ENTEROCOLITICA, DNA: NEGATIVE

## 2021-12-16 PROCEDURE — 87324 CLOSTRIDIUM AG IA: CPT

## 2021-12-16 PROCEDURE — 84145 PROCALCITONIN (PCT): CPT

## 2021-12-16 PROCEDURE — 96376 TX/PRO/DX INJ SAME DRUG ADON: CPT

## 2021-12-16 PROCEDURE — 77030038554 HC DRSG WND THERAHONEY MDII -Z

## 2021-12-16 PROCEDURE — G0378 HOSPITAL OBSERVATION PER HR: HCPCS

## 2021-12-16 PROCEDURE — 86140 C-REACTIVE PROTEIN: CPT

## 2021-12-16 PROCEDURE — 89055 LEUKOCYTE ASSESSMENT FECAL: CPT

## 2021-12-16 PROCEDURE — 85379 FIBRIN DEGRADATION QUANT: CPT

## 2021-12-16 PROCEDURE — 83735 ASSAY OF MAGNESIUM: CPT

## 2021-12-16 PROCEDURE — 96375 TX/PRO/DX INJ NEW DRUG ADDON: CPT

## 2021-12-16 PROCEDURE — C9113 INJ PANTOPRAZOLE SODIUM, VIA: HCPCS | Performed by: NURSE PRACTITIONER

## 2021-12-16 PROCEDURE — 80053 COMPREHEN METABOLIC PANEL: CPT

## 2021-12-16 PROCEDURE — 85027 COMPLETE CBC AUTOMATED: CPT

## 2021-12-16 PROCEDURE — 83036 HEMOGLOBIN GLYCOSYLATED A1C: CPT

## 2021-12-16 PROCEDURE — 2709999900 HC NON-CHARGEABLE SUPPLY

## 2021-12-16 PROCEDURE — 96372 THER/PROPH/DIAG INJ SC/IM: CPT

## 2021-12-16 PROCEDURE — 74011250636 HC RX REV CODE- 250/636: Performed by: NURSE PRACTITIONER

## 2021-12-16 PROCEDURE — 74011250637 HC RX REV CODE- 250/637: Performed by: INTERNAL MEDICINE

## 2021-12-16 PROCEDURE — 74011250636 HC RX REV CODE- 250/636: Performed by: INTERNAL MEDICINE

## 2021-12-16 PROCEDURE — 36415 COLL VENOUS BLD VENIPUNCTURE: CPT

## 2021-12-16 PROCEDURE — 82962 GLUCOSE BLOOD TEST: CPT

## 2021-12-16 PROCEDURE — 87506 IADNA-DNA/RNA PROBE TQ 6-11: CPT

## 2021-12-16 PROCEDURE — 74011636637 HC RX REV CODE- 636/637: Performed by: INTERNAL MEDICINE

## 2021-12-16 RX ADMIN — MIRTAZAPINE 15 MG: 15 TABLET, FILM COATED ORAL at 23:11

## 2021-12-16 RX ADMIN — METFORMIN HYDROCHLORIDE 1000 MG: 500 TABLET, EXTENDED RELEASE ORAL at 09:32

## 2021-12-16 RX ADMIN — PANTOPRAZOLE SODIUM 40 MG: 40 INJECTION, POWDER, FOR SOLUTION INTRAVENOUS at 23:11

## 2021-12-16 RX ADMIN — METFORMIN HYDROCHLORIDE 1000 MG: 500 TABLET, EXTENDED RELEASE ORAL at 17:57

## 2021-12-16 RX ADMIN — PANTOPRAZOLE SODIUM 40 MG: 40 INJECTION, POWDER, FOR SOLUTION INTRAVENOUS at 14:23

## 2021-12-16 RX ADMIN — FLUOXETINE 20 MG: 20 CAPSULE ORAL at 09:33

## 2021-12-16 RX ADMIN — CLONIDINE HYDROCHLORIDE 0.3 MG: 0.1 TABLET ORAL at 23:11

## 2021-12-16 RX ADMIN — ASPIRIN 81 MG: 81 TABLET, COATED ORAL at 09:33

## 2021-12-16 RX ADMIN — CLOPIDOGREL BISULFATE 75 MG: 75 TABLET ORAL at 09:32

## 2021-12-16 RX ADMIN — SODIUM CHLORIDE 75 ML/HR: 9 INJECTION, SOLUTION INTRAVENOUS at 19:01

## 2021-12-16 RX ADMIN — ATORVASTATIN CALCIUM 80 MG: 20 TABLET, FILM COATED ORAL at 17:56

## 2021-12-16 RX ADMIN — METOPROLOL SUCCINATE 100 MG: 50 TABLET, EXTENDED RELEASE ORAL at 09:33

## 2021-12-16 RX ADMIN — TAMSULOSIN HYDROCHLORIDE 0.4 MG: 0.4 CAPSULE ORAL at 17:57

## 2021-12-16 RX ADMIN — ENOXAPARIN SODIUM 40 MG: 100 INJECTION SUBCUTANEOUS at 09:33

## 2021-12-16 RX ADMIN — CLONIDINE HYDROCHLORIDE 0.3 MG: 0.1 TABLET ORAL at 09:32

## 2021-12-16 RX ADMIN — INSULIN LISPRO 3 UNITS: 100 INJECTION, SOLUTION INTRAVENOUS; SUBCUTANEOUS at 17:55

## 2021-12-16 NOTE — PROGRESS NOTES
[facility]    Medical Progress Note      NAME: Sindi Michael   :  1965  MRM:  [id number]    Date/Time of service 2021  9:10 AM          Assessment and Plan:     Nausea & vomiting diarrhea / Gastroenteritis / Leukocytosis - Likely viral vs food. Better today. No vomiting and only 1 small loose BM No sign of bacterial infection based on labs, vitals or CT. No Abx. Awaiting stool studies. GI consulted, and they wanted to do endoscopy, but cant due ot use of plavix, they also wanted to do emptying study, but cant because machine is broken. I discussed with patient. ADAT,and if okay DC home in AM to do all the workup as outpatient. Prn zofran. Negative RVP. .     ANDRE (acute kidney injury) on CKD (chronic kidney disease), stage III  - ANDRE POA, and better after IVF. Hydrate. Held chlorthalidone and olmesartan     DM type 2 causing vascular, renal and neurological disease - Diabetic diet when eating. SSI per protocol. Continue home metformin. Hold mixed insulin until eating well. Check A1c.      CAD (coronary artery disease) / HTN (hypertension) / Bifascicular block - Appears stable. Continue Norvasc, ASA, plavix, clonidine, metoprolol, but hold chlorthalidone and olmesartan     Depression with anxiety / ADHD - continue mirtazapine, fluoxetine and vyvance     Hx pulmonary embolism - Not on anticoagulation. Negative DDimer     BPH (benign prostatic hyperplasia) - Continue flomax     Dyslipidemia - Continue atorvastatin     Neuropathy - Continue lyrica     Lupus - Not on meds. Dx per chart.     Thoracic outlet syndrome - Monitor.     Hx Cerebral vascular disease - Continue ASA, statin       Subjective:     Chief Complaint:  vomiting and diarrhea stopped  ROS:  (bold if positive, if negative)    Tolerating PT  Tolerating clear Diet        Objective:     Last 24hrs VS reviewed since prior progress note.  Most recent are:    Visit Vitals  /80 (BP 1 Location: Right upper arm, BP Patient Position: At rest)   Pulse 76   Temp 97.7 °F (36.5 °C)   Resp 18   Ht 5' 11\" (1.803 m)   Wt 87.1 kg (192 lb)   SpO2 98%   BMI 26.78 kg/m²     SpO2 Readings from Last 6 Encounters:   12/16/21 98%   12/15/20 96%   11/22/20 96%   11/14/19 99%   04/16/19 96%   10/16/18 98%            Intake/Output Summary (Last 24 hours) at 12/16/2021 0910  Last data filed at 12/16/2021 0700  Gross per 24 hour   Intake 1288 ml   Output 400 ml   Net 888 ml        Physical Exam:    Gen:  Well-developed, well-nourished, in no acute distress  HEENT:  Pink conjunctivae, PERRL, hearing intact to voice, moist mucous membranes  Neck:  Supple, without masses, thyroid non-tender  Resp:  No accessory muscle use, clear breath sounds without wheezes rales or rhonchi  Card:  No murmurs, normal S1, S2 without thrills, bruits or peripheral edema  Abd:  Soft, non-tender, non-distended, normoactive bowel sounds are present, no mass  Lymph:  No cervical or inguinal adenopathy  Musc:  No cyanosis or clubbing  Skin:  No rashes or ulcers, skin turgor is good  Neuro:  Cranial nerves are grossly intact, no focal motor weakness, follows commands appropriately  Psych:  Good insight, oriented to person, place and time, alert    Telemetry reviewed:   normal sinus rhythm  __________________________________________________________________  Medications Reviewed: (see below)  Medications:     Current Facility-Administered Medications   Medication Dose Route Frequency    amLODIPine (NORVASC) tablet 10 mg  10 mg Oral QPM    aspirin delayed-release tablet 81 mg  81 mg Oral DAILY    atorvastatin (LIPITOR) tablet 80 mg  80 mg Oral QPM    clopidogreL (PLAVIX) tablet 75 mg  75 mg Oral DAILY    FLUoxetine (PROzac) capsule 20 mg  20 mg Oral DAILY    lisdexamfetamine (VYVANSE) capsule 30 mg  30 mg Oral DAILY PRN    metFORMIN ER (GLUCOPHAGE XR) tablet 1,000 mg  1,000 mg Oral BID    metoprolol succinate (TOPROL-XL) XL tablet 100 mg  100 mg Oral DAILY    mirtazapine (REMERON) tablet 15 mg  15 mg Oral QHS    tamsulosin (FLOMAX) capsule 0.4 mg  0.4 mg Oral QPM    acetaminophen (TYLENOL) tablet 650 mg  650 mg Oral Q6H PRN    polyethylene glycol (MIRALAX) packet 17 g  17 g Oral DAILY PRN    ondansetron (ZOFRAN) injection 4 mg  4 mg IntraVENous Q6H PRN    enoxaparin (LOVENOX) injection 40 mg  40 mg SubCUTAneous DAILY    0.9% sodium chloride infusion  75 mL/hr IntraVENous CONTINUOUS    glucose chewable tablet 16 g  4 Tablet Oral PRN    dextrose (D50W) injection syrg 12.5-25 g  25-50 mL IntraVENous PRN    glucagon (GLUCAGEN) injection 1 mg  1 mg IntraMUSCular PRN    insulin lispro (HUMALOG) injection   SubCUTAneous AC&HS    cloNIDine HCL (CATAPRES) tablet 0.3 mg  0.3 mg Oral TID        Lab Data Reviewed: (see below)  Lab Review:     Recent Labs     12/16/21  0534 12/15/21  1344   WBC 9.0 12.4*   HGB 15.0 16.7   HCT 45.5 49.4    313     Recent Labs     12/16/21  0534 12/15/21  1344    137   K 3.7 4.1    101   CO2 28 29   * 172*   BUN 22* 24*   CREA 1.35* 1.97*   CA 8.8 9.6   MG 2.5*  --    ALB 3.3* 3.6   TBILI 1.1* 1.1*   ALT 15 20     Lab Results   Component Value Date/Time    Glucose (POC) 135 (H) 12/16/2021 07:13 AM    Glucose (POC) 107 12/15/2021 10:39 PM    Glucose (POC) 78 11/22/2020 06:34 AM    Glucose (POC) 143 (H) 11/21/2020 09:10 PM    Glucose (POC) 218 (H) 11/21/2020 05:09 PM     No results for input(s): PH, PCO2, PO2, HCO3, FIO2 in the last 72 hours. No results for input(s): INR, INREXT in the last 72 hours.   All Micro Results     Procedure Component Value Units Date/Time    C. DIFFICILE AG & TOXIN A/B [[id number]] Collected: 12/16/21 0040    Order Status: Completed Specimen: Stool Updated: 12/16/21 0100    ENTERIC BACTERIA PANEL, DNA [[id number]] Collected: 12/16/21 0040    Order Status: Completed Specimen: Stool Updated: 12/16/21 0059    RESPIRATORY VIRUS PANEL W/COVID-19, PCR [[id number]] Collected: 12/15/21 2039    Order Status: Completed Specimen: Nasopharyngeal Updated: 12/16/21 0014     Adenovirus Not detected        Coronavirus 229E Not detected        Coronavirus HKU1 Not detected        Coronavirus CVNL63 Not detected        Coronavirus OC43 Not detected        SARS-CoV-2, PCR Not detected        Metapneumovirus Not detected        Rhinovirus and Enterovirus Not detected        Influenza A Not detected        Influenza A, subtype H1 Not detected        Influenza A, subtype H3 Not detected        INFLUENZA A H1N1 PCR Not detected        Influenza B Not detected        Parainfluenza 1 Not detected        Parainfluenza 2 Not detected        Parainfluenza 3 Not detected        Parainfluenza virus 4 Not detected        RSV by PCR Not detected        B. parapertussis, PCR Not detected        Bordetella pertussis - PCR Not detected        Chlamydophila pneumoniae DNA, QL, PCR Not detected        Mycoplasma pneumoniae DNA, QL, PCR Not detected       URINE CULTURE HOLD SAMPLE [[id number]] Collected: 12/15/21 2115    Order Status: Completed Specimen: Serum Updated: 12/15/21 2135     Urine culture hold       Urine on hold in Microbiology dept for 2 days. If unpreserved urine is submitted, it cannot be used for addtional testing after 24 hours, recollection will be required. URINE CULTURE HOLD SAMPLE [[id number]] Collected: 12/15/21 1344    Order Status: Completed Specimen: Urine from Serum Updated: 12/15/21 1354     Urine culture hold       Urine on hold in Microbiology dept for 2 days. If unpreserved urine is submitted, it cannot be used for addtional testing after 24 hours, recollection will be required. Other pertinent lab: none    Total time spent with patient: 30 Minutes I personally reviewed chart, notes, data and current medications in the medical record. I have personally examined and treated the patient at bedside during this period.                  Care Plan discussed with: Patient, Care Manager, Nursing Staff, Consultant/Specialist and >50% of time spent in counseling and coordination of care    Discussed:  Care Plan and D/C Planning    Prophylaxis:  Lovenox and H2B/PPI    Disposition:  Home w/Family           ___________________________________________________    Attending Physician: Rebel Daniels MD

## 2021-12-16 NOTE — PROGRESS NOTES
Patient denied having pain, dizziness, nausea, trouble breathing. Stated last BM 12/14/2021 morning, and that patient's urine volume is low, pt stated due to dehydration, that urine is brown/caramel colored per patient. Stated had not eaten any solid foods in mor than a week. Stated wears eyeglasses for driving, for watching movies, and at night. Skin: L butt cheek red, pt stated from being on stretcher. L bottom great toe ulcer patient stated seeing a podiatrist for. L side back tan skin tag pt stated not seeing a dermatologist for; L axillary flesh colored skin tag, patient stated had burned off area of skin using liquid nitrogen by physician; L lateral ankle dry, scabbed ulcer, patient and wife stated injury during cleaning bathroom; L forearm marcation, flat, intact skin area, pt stated is a granuloma that patient initially though was ring worm. BLE lower legs discolored/hyperpigmentation, intact skin. Patient and wife stated patient lost 10 pounds in the past 8 days. Pt reported having had a stroke 5/2015 effected short term memory; L hand can't type.   Pt also stated strength & corrdination L side effected by 5/2015 stroke

## 2021-12-16 NOTE — PROGRESS NOTES
Nurse informed that Gastric Emptying study could be done only on Monday 12/20/21 due to Sulfur Colloid shortage ( isotope we use for this study). Keep Pt.  Npo 4-6 hr prior to the test.

## 2021-12-16 NOTE — PROGRESS NOTES
12/15/2021 COVID nasal swab specimen this author walked to lab; urine specimen tubed to lab; 12/16/2021 stool specimen, ER charge nurse walked to lab.

## 2021-12-16 NOTE — PROGRESS NOTES
Phone call report: VERBAL_RECORDED_WRITTEN:[id number]::\"Bedside\"} shift change report given to Nurse Kevyn Orosco  (oncoming nurse) by Manish Farnsworth (offgoing nurse). Report included the following information SBAR, Kardex, ED Summary, Procedure Summary, Intake/Output, MAR, Recent Results and Med Rec Status, cardiac rhythm NSR per notes; Enteric isolation. Gurwinder Mcneil

## 2021-12-16 NOTE — PROGRESS NOTES
[facility]: MED RECONCILIATION    Medications added:     Trulicity weekly  Humalog SSI  Numerous OTC medications    Medications removed:    Lyrica - not filled since 2020  Chlorthalidone    Medications adjusted:    Clonidine 0.3 mg PO TID (instead of 0.4 mg PO BID)  Olmesartan 40 mg PO daily (instead of 20 mg)  Humalog mix 35 units SQ BID    Information obtained from: patient, detailed medication list from home, Rx query    Significant PMH/Disease States:   Past Medical History:   Diagnosis Date    ADHD     Anxiety and depression     Bifascicular block     BPH (benign prostatic hyperplasia)     CAD (coronary artery disease)     multivessel    CKD (chronic kidney disease), stage III ([address]     DM type 2 causing neurological disease ([address]     DM type 2 causing renal disease ([address]     DM type 2 causing vascular disease [address]     Dyslipidemia     HTN (hypertension)     Hx pulmonary embolism 06/2018    Insomnia     Lupus ([facility] [address]     Neuropathy     Thoracic outlet syndrome      Chief Complaint for this Admission:   Chief Complaint   Patient presents with    Abdominal Pain     Allergies: Iodinated contrast media    Prior to Admission Medications:     Prior to Admission Medications   Prescriptions Last Dose Informant Patient Reported? Taking? FLUoxetine (PROZAC) 10 mg tablet 12/7/2021 at am Self Yes Yes   Sig: Take 10 mg by mouth daily. OTHER,NON-FORMULARY, 12/7/2021 at pm Self Yes Yes   Sig: Take 1,250 mg by mouth every evening. Black Seed OTC   OTHER,NON-FORMULARY, 12/7/2021 at am Self Yes Yes   Sig: Take 500 mg by mouth daily. Lion's shabnam mushroom OTC   Wheat Dextrin (Benefiber Clear) 3 gram/3.5 gram pwpk 12/7/2021 at pm Self Yes Yes   Sig: Take 3 g by mouth every evening. acetaminophen (TYLENOL) 500 mg tablet  Self Yes Yes   Sig: Take 1,000 mg by mouth every six (6) hours as needed for Pain. amLODIPine (NORVASC) 10 mg tablet 12/7/2021 at pm Self Yes Yes   Sig: Take 10 mg by mouth every evening.    aspirin delayed-release 81 mg tablet 2021 at am Self Yes Yes   Sig: Take 1 Tab by mouth daily. atorvastatin (LIPITOR) 80 mg tablet 2021 at pm Self Yes Yes   Sig: Take 80 mg by mouth every evening. biotin 10,000 mcg cap 2021 at am Self Yes Yes   Sig: Take 10,000 mcg by mouth daily. bisacodyL 5 mg tab 2021 at am Self Yes Yes   Sig: Take 5 mg by mouth daily. cholecalciferol (VITAMIN D3) (5000 Units/125 mcg) tab tablet 2021 at am Self Yes Yes   Sig: Take 5,000 Units by mouth daily. cloNIDine HCL (CATAPRES) 0.3 mg tablet 2021 at pm Self Yes Yes   Sig: Take 0.3 mg by mouth three (3) times daily. clopidogrel (PLAVIX) 75 mg tablet 2021 at am Self No Yes   Sig: Take 1 Tab by mouth daily. cyclobenzaprine (FLEXERIL) 10 mg tablet 2021 at pm Self Yes Yes   Sig: Take 10 mg by mouth two (2) times a day. dulaglutide (Trulicity) 1.5 VU/6.2 mL sub-q pen 2021 Self Yes Yes   Si.5 mg by SubCUTAneous route every . glucosamine/msm/chondrt/C/hyal (GLUCOSAMINE-CHONDROITIN-MSM PO) 2021 at am Self Yes Yes   Sig: Take 1 Tablet by mouth daily. insulin lispro (HumaLOG U-100 Insulin) 100 unit/mL injection 12/15/2021 at am Self Yes Yes   Sig: by SubCUTAneous route Before breakfast, lunch, and dinner. Sliding scale depending on B-200 = 3 units  201-250 = 4 units  251-300 = 6 units  301-350 = 8 units  351-400 = 10 units  >400 = call MD   insulin lispro protamine/insulin lispro (HUMALOG MIX 75-25) 100 unit/mL (75-25) injection 12/15/2021 at am Self Yes Yes   Si Units by SubCUTAneous route Before breakfast and dinner. Based on BG   lisdexamfetamine (VYVANSE) 30 mg capsule 2021 at Unknown time Self Yes Yes   Sig: Take 30 mg by mouth daily as needed. magnesium citrate 100 mg tab 2021 at am Self Yes Yes   Sig: Take 200 mg by mouth daily. metFORMIN (GLUMETZA ER) 500 mg TG24 24 hour tablet 2021 at pm Self Yes Yes   Sig: Take 1,000 mg by mouth two (2) times a day. metoprolol succinate (TOPROL-XL) 100 mg tablet 12/7/2021 at am Self Yes Yes   Sig: Take 100 mg by mouth daily. mirtazapine (REMERON) 15 mg tablet 12/7/2021 at pm Self Yes Yes   Sig: Take 15 mg by mouth nightly. multivitamin, tx-iron-ca-min (THERA-M w/ IRON) 9 mg iron-400 mcg tab tablet 12/7/2021 at am Self Yes Yes   Sig: Take 1 Tablet by mouth daily. olmesartan (BENICAR) 40 mg tablet 12/7/2021 at am Self Yes Yes   Sig: Take 40 mg by mouth daily. tamsulosin (FLOMAX) 0.4 mg capsule 12/7/2021 at pm Self Yes Yes   Sig: Take 0.4 mg by mouth every evening. traZODone (DESYREL) 50 mg tablet  Self Yes Yes   Sig: Take 50 mg by mouth as needed. vit C/E/Zn/coppr/lutein/zeaxan (PRESERVISION AREDS-2 PO) 12/7/2021 at am Self Yes Yes   Sig: Take 1 Capsule by mouth daily. Facility-Administered Medications: None   Thank you,  Wild HIDALGO, [facility]

## 2021-12-16 NOTE — WOUND CARE
Wound Consult:  New Patient Visit. Chart reviewed. Consulted for left lateral ankle ulcer, left great toe, left buttock. Spoke with patients nurse,  Mally Kitchen and we were at bedside together. Patient is resting on a Versacare bed with accumax mattress. Heels off loaded independently and ambulates. Patient is alert and oriented x 4; requires no assistance to move side to side in bed. Gurjit score 20. Assessment:  Left lateral ankle ulcer - 1.2 x 1 x 0.1 cm, light brown eschar with light halo of erythema over protuberant bony prominence. No drainage. Patient reports he hit on piece of furniture. Left plantar great toe at base - dry healed area, no redness. Left inner buttock - 0.1 x 0.1 cm area of redness, blanches, no pain. No redness to sacral area, right buttocks or spine. No redness to heels. Treatment:  Medihoney applied to left lateral ankle ulcer with small optifoam to secure. Wound Recommendations:  Cleanse with soap and water, apply Medihoney gel, cover with foam and change every three days. Skin Care / PI Prevention Recommendations:  1. Minimize friction/shear: minimize layers of linen/pads under patient. 2. Off load pressure/reposition: currently independent in bed mobility. 3. Manage Moisture - reports continence. 4. Continue to monitor nutrition, pain, and skin risk scale, and skin assessment. Plan:  Hand off to Dr. Anya Potter regarding findings and proposed orders for treatment. We will continue to reassess weekly and as needed. Please re-consult should concerns arise despite continued skin/PI prevention measures.     Irvin Covington, MSN, RN, [address] [facility], Wound / [address] [facility] Office [phone]

## 2021-12-16 NOTE — CONSULTS
JUAN JOSÉ LealC  [phone] cell     Gastroenterology Consultation Note      Admit Date: 12/15/2021  Consult Date: 12/16/2021   I greatly appreciate your asking me to see Agustín Sanabria, thank you very much for the opportunity to participate in his care. Narrative Assessment and Plan   GI consult for nausea, vomiting, and diarrhea in this 59 y/o male. Recently seen by Dr. Ivonne St in the office and EGD and colonoscopy scheduled for 2/7/2022. Ultrasound also ordered. Complained of severe constipation at time of visit and escalating laxative directions combined with stool softeners were given for him to start. Diarrhea started the next day. Stool studies pending. Pain is epigastric. No overt signs of GI bleeding. Changed to PPI from [facility] at office visit. History of diabetic neuropathy. Reports intermittent episodes of nausea, vomiting, epigastric pain lasting 1-2 days for the last couple of years. Vomits food eaten hours ago and has sour belches. A1c 8. No history of CCY. CT without contrast without acute findings. Labs normal except one set of labs show ANDRE. No NSAID use. Takes Plavix daily. No alcohol or marijuana use. · Start PPI BID  · Will order GES, suspect gastroparesis given his symptoms. Discussed with him the dietary recommendations for gastroparesis. · Will proceed with plan of EGD and colonoscopy as outpatient. Perhaps we could move these up from February. Could proceed with EGD as IP but he would need to be off his Plavix prior to any procedures so would not be able to do until at least Monday, 12/20/2021. This was discussed with Mr. Sindi Muhammad. Subjective:     Chief Complaint: Nausea and Vomiting    History of Present Illness: GI consult for nausea, vomiting, and diarrhea in this 59 y/o male. Recently seen by Dr. Ivonne St in the office for complaint of nausea, vomiting, GERD, dysphagia, and constipation. EGD and colonoscopy scheduled for 2/7/2022.  Ultrasound also ordered. Eescalating laxative directions combined with stool softeners were given for him to start. Diarrhea started the next day. Stool studies pending. Pain is epigastric. No overt signs of GI bleeding. Changed to PPI from H2B at office visit. History of diabetic neuropathy. Reports intermittent episodes of nausea, vomiting, epigastric pain lasting 1-2 days for the last couple of years. Vomits food eaten hours ago and has sour belches. A1c 8. No history of CCY. CT without contrast without acute findings. Labs normal except one set of labs show ANDRE. No NSAID use. Takes Plavix daily. No alcohol or marijuana use. No alcohol use. Pertinent labs: WBC 8.1, hgb 13.4, hct 38.9, MCV 82.4, platelets 849, sodium 137, potassium 3.1, BUN 24, normal LFTs and lipase, CRP 1.15.      PCP:  Juan Antonio Gutierrez MD    Past Medical History:   Diagnosis Date    ADHD     Anxiety and depression     Bifascicular block     BPH (benign prostatic hyperplasia)     CAD (coronary artery disease)     multivessel    CKD (chronic kidney disease), stage III ([address]     DM type 2 causing neurological disease ([address]     DM type 2 causing renal disease ([address]     DM type 2 causing vascular disease ([address]     Dyslipidemia     HTN (hypertension)     Hx pulmonary embolism 06/2018    Insomnia     Lupus ([address]     Neuropathy     Thoracic outlet syndrome         Past Surgical History:   Procedure Laterality Date    HX ORTHOPAEDIC  1996    L. knee arthroscopy       Social History     Tobacco Use    Smoking status: Never Smoker    Smokeless tobacco: Never Used   Substance Use Topics    Alcohol use: No        Family History   Problem Relation Age of Onset    Heart Disease Mother     Stroke Mother     Diabetes Father     Stroke Father     Heart Disease Father         Allergies   Allergen Reactions    Iodinated Contrast Media Anaphylaxis            Home Medications:  Prior to Admission Medications   Prescriptions Last Dose Informant Patient Reported? Taking? FLUoxetine (PROZAC) 10 mg tablet 2021 at am Self Yes Yes   Sig: Take 10 mg by mouth daily. OTHER,NON-FORMULARY, 2021 at pm Self Yes Yes   Sig: Take 1,250 mg by mouth every evening. Black Seed OTC   OTHER,NON-FORMULARY, 2021 at am Self Yes Yes   Sig: Take 500 mg by mouth daily. Lion's shabnam mushroom OTC   Wheat Dextrin (Benefiber Clear) 3 gram/3.5 gram pwpk 2021 at pm Self Yes Yes   Sig: Take 3 g by mouth every evening. acetaminophen (TYLENOL) 500 mg tablet  Self Yes Yes   Sig: Take 1,000 mg by mouth every six (6) hours as needed for Pain. amLODIPine (NORVASC) 10 mg tablet 2021 at pm Self Yes Yes   Sig: Take 10 mg by mouth every evening. aspirin delayed-release 81 mg tablet 2021 at am Self Yes Yes   Sig: Take 1 Tab by mouth daily. atorvastatin (LIPITOR) 80 mg tablet 2021 at pm Self Yes Yes   Sig: Take 80 mg by mouth every evening. biotin 10,000 mcg cap 2021 at am Self Yes Yes   Sig: Take 10,000 mcg by mouth daily. bisacodyL 5 mg tab 2021 at am Self Yes Yes   Sig: Take 5 mg by mouth daily. cholecalciferol (VITAMIN D3) (5000 Units/125 mcg) tab tablet 2021 at am Self Yes Yes   Sig: Take 5,000 Units by mouth daily. cloNIDine HCL (CATAPRES) 0.3 mg tablet 2021 at pm Self Yes Yes   Sig: Take 0.3 mg by mouth three (3) times daily. clopidogrel (PLAVIX) 75 mg tablet 2021 at am Self No Yes   Sig: Take 1 Tab by mouth daily. cyclobenzaprine (FLEXERIL) 10 mg tablet 2021 at pm Self Yes Yes   Sig: Take 10 mg by mouth two (2) times a day. dulaglutide (Trulicity) 1.5 WG/3.4 mL sub-q pen 2021 Self Yes Yes   Si.5 mg by SubCUTAneous route every . glucosamine/msm/chondrt/C/hyal (GLUCOSAMINE-CHONDROITIN-MSM PO) 2021 at am Self Yes Yes   Sig: Take 1 Tablet by mouth daily.    insulin lispro (HumaLOG U-100 Insulin) 100 unit/mL injection 12/15/2021 at am Self Yes Yes   Sig: by SubCUTAneous route Before breakfast, lunch, and dinner. Sliding scale depending on B-200 = 3 units  201-250 = 4 units  251-300 = 6 units  301-350 = 8 units  351-400 = 10 units  >400 = call MD   insulin lispro protamine/insulin lispro (HUMALOG MIX 75-25) 100 unit/mL (75-25) injection 12/15/2021 at am Self Yes Yes   Si Units by SubCUTAneous route Before breakfast and dinner. Based on BG   lisdexamfetamine (VYVANSE) 30 mg capsule 2021 at Unknown time Self Yes Yes   Sig: Take 30 mg by mouth daily as needed. magnesium citrate 100 mg tab 2021 at am Self Yes Yes   Sig: Take 200 mg by mouth daily. metFORMIN (GLUMETZA ER) 500 mg TG24 24 hour tablet 2021 at pm Self Yes Yes   Sig: Take 1,000 mg by mouth two (2) times a day. metoprolol succinate (TOPROL-XL) 100 mg tablet 2021 at am Self Yes Yes   Sig: Take 100 mg by mouth daily. mirtazapine (REMERON) 15 mg tablet 2021 at pm Self Yes Yes   Sig: Take 15 mg by mouth nightly. multivitamin, tx-iron-ca-min (THERA-M w/ IRON) 9 mg iron-400 mcg tab tablet 2021 at am Self Yes Yes   Sig: Take 1 Tablet by mouth daily. olmesartan (BENICAR) 40 mg tablet 2021 at am Self Yes Yes   Sig: Take 40 mg by mouth daily. tamsulosin (FLOMAX) 0.4 mg capsule 2021 at pm Self Yes Yes   Sig: Take 0.4 mg by mouth every evening. traZODone (DESYREL) 50 mg tablet  Self Yes Yes   Sig: Take 50 mg by mouth as needed. vit C/E/Zn/coppr/lutein/zeaxan (PRESERVISION AREDS-2 PO) 2021 at am Self Yes Yes   Sig: Take 1 Capsule by mouth daily.       Facility-Administered Medications: None       Hospital Medications:  Current Facility-Administered Medications   Medication Dose Route Frequency    pantoprazole (PROTONIX) 40 mg in 0.9% sodium chloride 10 mL injection  40 mg IntraVENous Q12H    amLODIPine (NORVASC) tablet 10 mg  10 mg Oral QPM    aspirin delayed-release tablet 81 mg  81 mg Oral DAILY    atorvastatin (LIPITOR) tablet 80 mg  80 mg Oral QPM    clopidogreL (PLAVIX) tablet 75 mg  75 mg Oral DAILY    FLUoxetine (PROzac) capsule 20 mg  20 mg Oral DAILY    lisdexamfetamine (VYVANSE) capsule 30 mg  30 mg Oral DAILY PRN    metFORMIN ER (GLUCOPHAGE XR) tablet 1,000 mg  1,000 mg Oral BID    metoprolol succinate (TOPROL-XL) XL tablet 100 mg  100 mg Oral DAILY    mirtazapine (REMERON) tablet 15 mg  15 mg Oral QHS    tamsulosin (FLOMAX) capsule 0.4 mg  0.4 mg Oral QPM    acetaminophen (TYLENOL) tablet 650 mg  650 mg Oral Q6H PRN    polyethylene glycol (MIRALAX) packet 17 g  17 g Oral DAILY PRN    ondansetron (ZOFRAN) injection 4 mg  4 mg IntraVENous Q6H PRN    enoxaparin (LOVENOX) injection 40 mg  40 mg SubCUTAneous DAILY    0.9% sodium chloride infusion  75 mL/hr IntraVENous CONTINUOUS    glucose chewable tablet 16 g  4 Tablet Oral PRN    dextrose (D50W) injection syrg 12.5-25 g  25-50 mL IntraVENous PRN    glucagon (GLUCAGEN) injection 1 mg  1 mg IntraMUSCular PRN    insulin lispro (HUMALOG) injection   SubCUTAneous AC&HS    cloNIDine HCL (CATAPRES) tablet 0.3 mg  0.3 mg Oral TID       Review of Systems: Per HPI, otherwise negative.       Objective:     Physical Exam:  Visit Vitals  /84 (BP 1 Location: Left upper arm, BP Patient Position: At rest)   Pulse 67   Temp 97.6 °F (36.4 °C)   Resp 18   Ht 5' 11\" (1.803 m)   Wt 87.1 kg (192 lb)   SpO2 96%   BMI 26.78 kg/m²     SpO2 Readings from Last 6 Encounters:   12/16/21 96%   12/15/20 96%   11/22/20 96%   11/14/19 99%   04/16/19 96%   10/16/18 98%            Intake/Output Summary (Last 24 hours) at 12/16/2021 1334  Last data filed at 12/16/2021 0700  Gross per 24 hour   Intake 1288 ml   Output 400 ml   Net 888 ml      General: no distress  Skin:  No rash or palpable dermatologic mass lesions  HEENT: Pupils equal, sclera anicteric, oropharynx with no gross lesions  Cardiovascular: No abnormal audible heart sounds, well perfused, no edema  Respiratory:  No abnormal audible breath sounds, normal respiratory effort, no throacic deformity  GI:   Abdomen nondistended, mild epigastric TTP, no mass, no free fluid, no rebound or guarding. Musculoskeletal:  No skeletal deformity nor acute arthritis noted. Neurological:  Motor and sensory function intact in upper extremeties  Psychiatric:  Normal affect, memory intact, appears to have insight into current illness    Laboratory:    Recent Results (from the past 24 hour(s))   CBC WITH AUTOMATED DIFF    Collection Time: 12/15/21  1:44 PM   Result Value Ref Range    WBC 12.4 (H) 4.1 - 11.1 K/uL    RBC 5.83 (H) 4.10 - 5.70 M/uL    HGB 16.7 12.1 - 17.0 g/dL    HCT 49.4 36.6 - 50.3 %    MCV 84.7 80.0 - 99.0 FL    MCH 28.6 26.0 - 34.0 PG    MCHC 33.8 30.0 - 36.5 g/dL    RDW 14.0 11.5 - 14.5 %    PLATELET 432 511 - 548 K/uL    MPV 9.2 8.9 - 12.9 FL    NRBC 0.0 0  WBC    ABSOLUTE NRBC 0.00 0.00 - 0.01 K/uL    NEUTROPHILS 75 32 - 75 %    LYMPHOCYTES 12 12 - 49 %    MONOCYTES 5 5 - 13 %    EOSINOPHILS 8 (H) 0 - 7 %    BASOPHILS 0 0 - 1 %    IMMATURE GRANULOCYTES 0 0.0 - 0.5 %    ABS. NEUTROPHILS 9.2 (H) 1.8 - 8.0 K/UL    ABS. LYMPHOCYTES 1.5 0.8 - 3.5 K/UL    ABS. MONOCYTES 0.6 0.0 - 1.0 K/UL    ABS. EOSINOPHILS 0.9 (H) 0.0 - 0.4 K/UL    ABS. BASOPHILS 0.0 0.0 - 0.1 K/UL    ABS. IMM. GRANS. 0.1 (H) 0.00 - 0.04 K/UL    DF AUTOMATED     METABOLIC PANEL, COMPREHENSIVE    Collection Time: 12/15/21  1:44 PM   Result Value Ref Range    Sodium 137 136 - 145 mmol/L    Potassium 4.1 3.5 - 5.1 mmol/L    Chloride 101 97 - 108 mmol/L    CO2 29 21 - 32 mmol/L    Anion gap 7 5 - 15 mmol/L    Glucose 172 (H) 65 - 100 mg/dL    BUN 24 (H) 6 - 20 MG/DL    Creatinine 1.97 (H) 0.70 - 1.30 MG/DL    BUN/Creatinine ratio 12 12 - 20      GFR est AA 43 (L) >60 ml/min/1.73m2    GFR est non-AA 35 (L) >60 ml/min/1.73m2    Calcium 9.6 8.5 - 10.1 MG/DL    Bilirubin, total 1.1 (H) 0.2 - 1.0 MG/DL    ALT (SGPT) 20 12 - 78 U/L    AST (SGOT) 16 15 - 37 U/L    Alk.  phosphatase 114 45 - 117 U/L    Protein, total 7.1 6.4 - 8.2 g/dL Albumin 3.6 3.5 - 5.0 g/dL    Globulin 3.5 2.0 - 4.0 g/dL    A-G Ratio 1.0 (L) 1.1 - 2.2     LIPASE    Collection Time: 12/15/21  1:44 PM   Result Value Ref Range    Lipase 83 73 - 393 U/L   URINALYSIS W/MICROSCOPIC    Collection Time: 12/15/21  1:44 PM   Result Value Ref Range    Color DARK YELLOW      Appearance CLOUDY (A) CLEAR      Specific gravity 1.021 1.003 - 1.030      pH (UA) 5.5 5.0 - 8.0      Protein 300 (A) NEG mg/dL    Glucose Negative NEG mg/dL    Ketone Negative NEG mg/dL    Blood Negative NEG      Urobilinogen 0.2 0.2 - 1.0 EU/dL    Nitrites Negative NEG      Leukocyte Esterase TRACE (A) NEG      WBC 0-4 0 - 4 /hpf    RBC 5-10 0 - 5 /hpf    Epithelial cells FEW FEW /lpf    Bacteria Negative NEG /hpf    Uric acid crystals 1+ (A) NEG    Hyaline cast 5-10 0 - 5 /lpf   URINE CULTURE HOLD SAMPLE    Collection Time: 12/15/21  1:44 PM    Specimen: Serum; Urine   Result Value Ref Range    Urine culture hold        Urine on hold in Microbiology dept for 2 days. If unpreserved urine is submitted, it cannot be used for addtional testing after 24 hours, recollection will be required.    BILIRUBIN, CONFIRM    Collection Time: 12/15/21  1:44 PM   Result Value Ref Range    Bilirubin UA, confirm Negative NEG     RESPIRATORY VIRUS PANEL W/COVID-19, PCR    Collection Time: 12/15/21  8:39 PM    Specimen: Nasopharyngeal   Result Value Ref Range    Adenovirus Not detected NOTD      Coronavirus 229E Not detected NOTD      Coronavirus HKU1 Not detected NOTD      Coronavirus CVNL63 Not detected NOTD      Coronavirus OC43 Not detected NOTD      SARS-CoV-2, PCR Not detected NOTD      Metapneumovirus Not detected NOTD      Rhinovirus and Enterovirus Not detected NOTD      Influenza A Not detected NOTD      Influenza A, subtype H1 Not detected NOTD      Influenza A, subtype H3 Not detected NOTD      INFLUENZA A H1N1 PCR Not detected NOTD      Influenza B Not detected NOTD      Parainfluenza 1 Not detected NOTD Parainfluenza 2 Not detected NOTD      Parainfluenza 3 Not detected NOTD      Parainfluenza virus 4 Not detected NOTD      RSV by PCR Not detected NOTD      B. parapertussis, PCR Not detected NOTD      Bordetella pertussis - PCR Not detected NOTD      Chlamydophila pneumoniae DNA, QL, PCR Not detected NOTD      Mycoplasma pneumoniae DNA, QL, PCR Not detected NOTD     DRUG SCREEN, URINE    Collection Time: 12/15/21  9:15 PM   Result Value Ref Range    AMPHETAMINES Negative NEG      BARBITURATES Negative NEG      BENZODIAZEPINES Negative NEG      COCAINE Negative NEG      METHADONE Negative NEG      OPIATES Negative NEG      PCP(PHENCYCLIDINE) Negative NEG      THC (TH-CANNABINOL) Negative NEG      Drug screen comment (NOTE)    URINE CULTURE HOLD SAMPLE    Collection Time: 12/15/21  9:15 PM    Specimen: Serum   Result Value Ref Range    Urine culture hold        Urine on hold in Microbiology dept for 2 days. If unpreserved urine is submitted, it cannot be used for addtional testing after 24 hours, recollection will be required.    GLUCOSE, POC    Collection Time: 12/15/21 10:39 PM   Result Value Ref Range    Glucose (POC) 107 65 - 117 mg/dL    Performed by JOSE IZAGUIRRE    PROCALCITONIN    Collection Time: 12/16/21  5:34 AM   Result Value Ref Range    Procalcitonin 0.05 ng/mL   C REACTIVE PROTEIN, QT    Collection Time: 12/16/21  5:34 AM   Result Value Ref Range    C-Reactive protein 1.15 (H) 0.00 - 0.60 mg/dL   D DIMER    Collection Time: 12/16/21  5:34 AM   Result Value Ref Range    D-dimer 0.39 0.00 - 0.65 mg/L FEU   HEMOGLOBIN A1C WITH EAG    Collection Time: 12/16/21  5:34 AM   Result Value Ref Range    Hemoglobin A1c 8.0 (H) 4.0 - 5.6 %    Est. average glucose 247 mg/dL   METABOLIC PANEL, COMPREHENSIVE    Collection Time: 12/16/21  5:34 AM   Result Value Ref Range    Sodium 139 136 - 145 mmol/L    Potassium 3.7 3.5 - 5.1 mmol/L    Chloride 105 97 - 108 mmol/L    CO2 28 21 - 32 mmol/L    Anion gap 6 5 - 15 mmol/L    Glucose 127 (H) 65 - 100 mg/dL    BUN 22 (H) 6 - 20 MG/DL    Creatinine 1.35 (H) 0.70 - 1.30 MG/DL    BUN/Creatinine ratio 16 12 - 20      GFR est AA >60 >60 ml/min/1.73m2    GFR est non-AA 54 (L) >60 ml/min/1.73m2    Calcium 8.8 8.5 - 10.1 MG/DL    Bilirubin, total 1.1 (H) 0.2 - 1.0 MG/DL    ALT (SGPT) 15 12 - 78 U/L    AST (SGOT) 16 15 - 37 U/L    Alk. phosphatase 123 (H) 45 - 117 U/L    Protein, total 6.3 (L) 6.4 - 8.2 g/dL    Albumin 3.3 (L) 3.5 - 5.0 g/dL    Globulin 3.0 2.0 - 4.0 g/dL    A-G Ratio 1.1 1.1 - 2.2     MAGNESIUM    Collection Time: 12/16/21  5:34 AM   Result Value Ref Range    Magnesium 2.5 (H) 1.6 - 2.4 mg/dL   CBC W/O DIFF    Collection Time: 12/16/21  5:34 AM   Result Value Ref Range    WBC 9.0 4.1 - 11.1 K/uL    RBC 5.30 4. 10 - 5.70 M/uL    HGB 15.0 12.1 - 17.0 g/dL    HCT 45.5 36.6 - 50.3 %    MCV 85.8 80.0 - 99.0 FL    MCH 28.3 26.0 - 34.0 PG    MCHC 33.0 30.0 - 36.5 g/dL    RDW 13.9 11.5 - 14.5 %    PLATELET 715 580 - 038 K/uL    MPV 9.5 8.9 - 12.9 FL    NRBC 0.0 0  WBC    ABSOLUTE NRBC 0.00 0.00 - 0.01 K/uL   GLUCOSE, POC    Collection Time: 12/16/21  7:13 AM   Result Value Ref Range    Glucose (POC) 135 (H) 65 - 117 mg/dL    Performed by [address] [place] [address], POC    Collection Time: 12/16/21 12:03 PM   Result Value Ref Range    Glucose (POC) 138 (H) 65 - 117 mg/dL    Performed by David Beasley          Assessment/Plan:     Active Problems:    BPH (benign prostatic hyperplasia) (9/1/2013)      Dyslipidemia (9/2/2013)      Overview: 9/2/13 Lipid panel   HDL 25       Neuropathy ()      DM type 2 causing vascular disease ([address] ()      DM type 2 causing neurological disease (HCC) ()      Lupus (HCC) ()      Thoracic outlet syndrome ()      DM type 2 causing renal disease ([address] ()      CKD (chronic kidney disease), stage III ([address] ()      Cerebral vascular disease (5/30/2017)      H/O: stroke (6/2/2018)      Depression with anxiety (11/19/2020) CAD (coronary artery disease) ()      Overview: multivessel      HTN (hypertension) ()      Hx pulmonary embolism (6/2018)      Leukocytosis (12/15/2021)      Bifascicular block ()      Nausea & vomiting (12/15/2021)      Gastroenteritis (12/15/2021)      ANDRE (acute kidney injury) ([facility] (12/15/2021)         See above narrative for full detail. Marita Morrison, NP    He states that he feels better as of this moment. Advice for tomorrow he proceed with gastric emptying scan if he feels that he would be able to retain the ingested radioactive meal for scanning. If he cannot keep this meal down the scan would be meaningless and inconclusive. Therefore if he felt his nausea returned again to the point where it is not likely he would retain advised to speak with the nurse caring for him so that the scan can be canceled and he undergo EGD exam instead. I have discussed EGD biopsy, dilation, alternatives complications including but not limited to pain, cardiopulmonary event, bleeding, perforation; all questions answered.   I have interviewed and examined patient with addendum to note above and formulation care plan to reflect my evaluation    Marcy Leonard M.D.

## 2021-12-16 NOTE — PROGRESS NOTES
12/16/2021  11:55 AM  Care Management Assessment      Reason for Admission: Emergency - N/V/D. GI consulted. ICD-10-CM ICD-9-CM    1. ANDRE (acute kidney injury) ([facility]  N17.9 584.9    2. Gastroenteritis, acute  K52.9 558.9        Assessment:   []In person with pt   [x]Via p/c with pt   []With family member in person. Who/Relation:     []With family member via p/c. Who/Relation:   []Chart Review    RUR: NA - OBS  Risk Level: [x]Low []Moderate []High  Value-based purchasing: [] Yes [] No  Bundle patient: [] Yes [x] No   Specify:     Advance Directive: Full Code.    [] No AD on file. [] AD on file. [x] Current AD not on file. Copy requested. [] Requests AD, and referral submitted to [facility]. Healthcare Decision Maker: Wife - Neisha Laurence        Assessment:    Age: 60    Sex: [x] Male []Female     Residency: [x]Private residence []Apartment []Assisted Living []LTC []Other:   Exterior Steps: 3  Interior Steps: 0    Lives With: [x]With spouse []Other family members []Underage children []Alone []Care provider []Other:    Prior functioning:  [x]Independent with ADLs and iADLS []Dependent with ADLs and iADLs []Partial dependence, Specify:     Prior DME required:  [x]None []RW []Cane []Crutches []Bedside commode []CPAP []Home O2 (Liter/Provider: ) []Nebulizer   []Shower Chair []Wheelchair []Hospital Bed []Elpidio []Stair lift []Rollator []Other:    DME available: [x]None []RW []Cane []Crutches []Bedside commode []CPAP []Home O2 (Liter/Provider: ) []Nebulizer   []Shower Chair []Wheelchair []Hospital Bed []Elpidio []Stair lift []Rollator []Other:    Rehab history: [x]None []Outpatient PT []Home Health (Provider/Date: ) []SNF (Provider/Date: ) []IPR (Provider/Date: ) []LTC (Provider/Date: ) []Hospice (Provider/Date: )  []Other:     Discharge Concerns: []Yes [x]No []Unknown   Describe:    Comments:      Insurer:   Insurance Information                BLUE CROSS/MAIA Morales8 Aidan Lozano Phone: --    Subscriber: Alexis Quezada Alfred Johnston Subscriber#: [id number]    Group#: [id number] Precert#: --        [place] MED[place]ARE/[place] MEDICARE PART A & B Phone: [phone]    Subscriber: Francisco Rahman Subscriber#: [id number]    Group#: -- Precert#: --      Observation notice provided in writing to patient and/or caregiver as well as verbal explanation of the policy. Patients who are in outpatient status also receive the Observation notice. Patient has received notice and or patient representative has received via secure email, fax, or certified mail based on patient representative's preference. Pt did not sign. PCP: Zenaida Burton   Address: [address]   Phone number: [phone]   Current patient: [x]Yes []No   Approximate date of last visit: 7 weeks ago   Access to virtual PCP visits: [x]Yes []No    Pharmacy:  [facility] [address] [facility] Transport: Family       Transition of care plan:    []Unable to determine at this time. Awaiting clinical progress, and disposition recommendations. [x] Home with outpatient follow-up. [] Home with Outpatient PT and outpatient follow-up   Pt aware of OP appt? []Yes, Provider:   []Not scheduled   Transport provider:     [] Home with family assistance as needed and outpatient follow-up   Family able to assist:    Schedule:  Transport provider:      [] Home with Home Health   - Provider:     []SNF/IPR   -[]Preferences given:   []Listing provided and preferences requested   -Status: []Pending []Accepted:    -Auth required: []Yes []No    -Auth initiated date:   -3 midnight stay required: []Yes []No  Date satisfied:     [] Home with Hospice   -Provider:     [] Dispatch Health information provided. [] Other:     Jamie Melendez MA    Care Management Interventions  PCP Verified by CM: Yes Hipolito Rough)  Mode of Transport at Discharge:  Other (see comment)  MyChart Signup: No  Discharge Durable Medical Equipment: No  Physical Therapy Consult: No  Occupational Therapy Consult: No  Speech Therapy Consult: No  Support Systems: Spouse/Significant Other  Confirm Follow Up Transport: Family  Discharge Location  Discharge Placement: Home with family assistance

## 2021-12-17 VITALS
SYSTOLIC BLOOD PRESSURE: 136 MMHG | HEIGHT: 71 IN | HEART RATE: 72 BPM | DIASTOLIC BLOOD PRESSURE: 79 MMHG | WEIGHT: 192 LBS | BODY MASS INDEX: 26.88 KG/M2 | OXYGEN SATURATION: 97 % | TEMPERATURE: 98 F | RESPIRATION RATE: 18 BRPM

## 2021-12-17 LAB
GLUCOSE BLD STRIP.AUTO-MCNC: 117 MG/DL (ref 65–117)
GLUCOSE BLD STRIP.AUTO-MCNC: 140 MG/DL (ref 65–117)
SERVICE CMNT-IMP: ABNORMAL
SERVICE CMNT-IMP: NORMAL
WBC #/AREA STL HPF: NORMAL /HPF (ref 0–4)

## 2021-12-17 PROCEDURE — C9113 INJ PANTOPRAZOLE SODIUM, VIA: HCPCS | Performed by: NURSE PRACTITIONER

## 2021-12-17 PROCEDURE — 74011250636 HC RX REV CODE- 250/636: Performed by: INTERNAL MEDICINE

## 2021-12-17 PROCEDURE — 74011250636 HC RX REV CODE- 250/636: Performed by: NURSE PRACTITIONER

## 2021-12-17 PROCEDURE — 82962 GLUCOSE BLOOD TEST: CPT

## 2021-12-17 PROCEDURE — G0378 HOSPITAL OBSERVATION PER HR: HCPCS

## 2021-12-17 PROCEDURE — 96376 TX/PRO/DX INJ SAME DRUG ADON: CPT

## 2021-12-17 PROCEDURE — 74011250637 HC RX REV CODE- 250/637: Performed by: INTERNAL MEDICINE

## 2021-12-17 RX ORDER — PANTOPRAZOLE SODIUM 40 MG/1
40 TABLET, DELAYED RELEASE ORAL DAILY
Qty: 30 TABLET | Refills: 0 | Status: SHIPPED | OUTPATIENT
Start: 2021-12-17 | End: 2022-01-16

## 2021-12-17 RX ORDER — PANTOPRAZOLE SODIUM 40 MG/1
40 TABLET, DELAYED RELEASE ORAL
Status: DISCONTINUED | OUTPATIENT
Start: 2021-12-17 | End: 2021-12-17 | Stop reason: HOSPADM

## 2021-12-17 RX ADMIN — CLONIDINE HYDROCHLORIDE 0.3 MG: 0.1 TABLET ORAL at 08:51

## 2021-12-17 RX ADMIN — FLUOXETINE 20 MG: 20 CAPSULE ORAL at 08:51

## 2021-12-17 RX ADMIN — METFORMIN HYDROCHLORIDE 1000 MG: 500 TABLET, EXTENDED RELEASE ORAL at 08:51

## 2021-12-17 RX ADMIN — METOPROLOL SUCCINATE 100 MG: 50 TABLET, EXTENDED RELEASE ORAL at 08:51

## 2021-12-17 RX ADMIN — SODIUM CHLORIDE 75 ML/HR: 9 INJECTION, SOLUTION INTRAVENOUS at 08:51

## 2021-12-17 RX ADMIN — PANTOPRAZOLE SODIUM 40 MG: 40 INJECTION, POWDER, FOR SOLUTION INTRAVENOUS at 08:52

## 2021-12-17 RX ADMIN — ASPIRIN 81 MG: 81 TABLET, COATED ORAL at 08:51

## 2021-12-17 NOTE — PROGRESS NOTES
NUTRITION     Best practice alert was triggered based on results obtained during nursing admission assessment for Poor appetite and Unintentional wt loss: 2-13#. Patient Vitals for the past 168 hrs:   % Diet Eaten   12/16/21 1420 76 - 100%   12/15/21 2245 76 - 100%     Wt Readings from Last 10 Encounters:   12/15/21 87.1 kg (192 lb)   12/15/20 93.4 kg (206 lb)   11/19/20 98.4 kg (217 lb)   11/14/19 97.5 kg (215 lb)   04/16/19 96.2 kg (212 lb)   10/16/18 97.5 kg (215 lb)   09/08/18 99.8 kg (220 lb)   06/02/18 101.6 kg (224 lb)   02/01/18 101.5 kg (223 lb 12.8 oz)   06/23/17 107 kg (235 lb 14.3 oz)        The patient's chart was reviewed and nutrition assessment is not indicated at this time. PO intake averaging 75% or more of all meals;  Weight loss of 14# (6%) x 1 year is not significant for timeframe. Plan to see patient for rescreen as indicated. Thank you.      Abhi Cuadra or via Back&

## 2021-12-17 NOTE — PROGRESS NOTES
12/17/2021 1:24 PM EMR reviewed, discharge order noted. No discharge needs identified. Pt will have GI follow up which is to be arranged by GI team. Saintclair Best, BSW    Care Management Interventions  PCP Verified by CM: Yes Savi Segundo)  Mode of Transport at Discharge:  Other (see comment)  MyChart Signup: No  Discharge Durable Medical Equipment: No  Physical Therapy Consult: No  Occupational Therapy Consult: No  Speech Therapy Consult: No  Support Systems: Spouse/Significant Other  Confirm Follow Up Transport: Family  Discharge Location  Discharge Placement: Home with family assistance

## 2021-12-17 NOTE — DISCHARGE INSTRUCTIONS
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) ()    Lupus (HCC) ()    Thoracic outlet syndrome ()    DM type 2 causing renal disease ([facility] ()    CKD (chronic kidney disease), stage III (HCC) ()    Cerebral vascular disease (2017)    H/O: stroke (2018)    Depression with anxiety (2020)    CAD (coronary artery disease): Overview: multivessel    HTN (hypertension) ()    Hx pulmonary embolism (2018)    Bifascicular block ()      MEDICATIONS:    · It is important that medications are taken exactly as they are prescribed on the discharge medication instructions and keep them your  in the bottles provided by the pharmacist.   · Keep a list of the medication names, dosages, and times to be taken at all times. · Do not take other medications without consulting your doctor.      Recommended diet:  Diabetic Diet    Recommended activity: Activity as tolerated    Post discharge care:    Notify follow up health care provider or return to the emergency department if you cannot get hold of your doctor if you feel worse or experience symptoms similar to those that brought you to hospital    Follow-up Information     Follow up With Specialties Details Why Contact Yang Sampson, [address]  [phone]            Information obtained by :  I understand that if any problems occur once I am at home I am to contact my physician and I understand and acknowledge receipt of the instructions indicated above.                                                                                                                                            Physician's or R.N.'s Signature                                                                  Date/Time                                                                                                                                              Patient or Representative Signature                                                          Date/Time

## 2021-12-17 NOTE — PROGRESS NOTES
Progress Note  Date:2021       Room:[facility]  Patient Name:Stephen Earnstine Severs     YOB: 1965     Age:56 y.o. Subjective    Subjective:  Symptoms:  Improved. Diet:  Adequate intake. No nausea or vomiting. Pain:  He reports no pain. Review of Systems   Constitutional: Negative for appetite change, fatigue and fever. Cardiovascular: Negative for leg swelling. Gastrointestinal: Negative for abdominal pain, blood in stool, nausea and vomiting. Objective         Vitals Last 24 Hours:  TEMPERATURE:  Temp  Av.8 °F (36.6 °C)  Min: 97.5 °F (36.4 °C)  Max: 98.1 °F (36.7 °C)  RESPIRATIONS RANGE: Resp  Av  Min: 18  Max: 18  PULSE OXIMETRY RANGE: SpO2  Av.6 %  Min: 95 %  Max: 98 %  PULSE RANGE: Pulse  Av.8  Min: 67  Max: 87  BLOOD PRESSURE RANGE: Systolic (71MHG), NYZ:331 , Min:101 , RCR:982   ; Diastolic (56BPV), WDH:17, Min:69, Max:90    I/O (24Hr): Intake/Output Summary (Last 24 hours) at 2021 0913  Last data filed at 2021 1613  Gross per 24 hour   Intake 840 ml   Output    Net 840 ml     Objective:  General Appearance:  Comfortable and not in pain. Vital signs: (most recent): Blood pressure (!) 146/90, pulse 70, temperature 98.1 °F (36.7 °C), resp. rate 18, height 5' 11\" (1.803 m), weight 87.1 kg (192 lb), SpO2 97 %. No fever. Output: Producing stool. HEENT: Normal HEENT exam.    Lungs:  Normal effort and normal respiratory rate. Breath sounds clear to auscultation. Heart: Normal rate. Regular rhythm. S1 normal and S2 normal.  No murmur. Abdomen: Abdomen is soft and non-distended. Bowel sounds are normal.   There is no abdominal tenderness. Extremities: Normal range of motion. There is no dependent edema. Pulses: Distal pulses are intact. Neurological: Patient is alert and oriented to person, place and time. Pupils:  Pupils are equal, round, and reactive to light.     Skin:  Warm and dry.      Labs/Imaging/Diagnostics    Labs:  CBC:  Recent Labs     12/16/21  0534 12/15/21  1344   WBC 9.0 12.4*   RBC 5.30 5.83*   HGB 15.0 16.7   HCT 45.5 49.4   MCV 85.8 84.7   RDW 13.9 14.0    313     CHEMISTRIES:  Recent Labs     12/16/21  0534 12/15/21  1344    137   K 3.7 4.1    101   CO2 28 29   BUN 22* 24*   CA 8.8 9.6   MG 2.5*  --    PT/INR:No results for input(s): INR, INREXT in the last 72 hours. No lab exists for component: PROTIME  APTT:No results for input(s): APTT in the last 72 hours. LIVER PROFILE:  Recent Labs     12/16/21  0534 12/15/21  1344   AST 16 16   ALT 15 20     Lab Results   Component Value Date/Time    ALT (SGPT) 15 12/16/2021 05:34 AM    AST (SGOT) 16 12/16/2021 05:34 AM    Alk. phosphatase 123 (H) 12/16/2021 05:34 AM    Bilirubin, total 1.1 (H) 12/16/2021 05:34 AM       Imaging Last 24 Hours:  No results found. 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) ()      Lupus (HCC) ()      Thoracic outlet syndrome ()      DM type 2 causing renal disease (HCC) ()      CKD (chronic kidney disease), stage III (HCC) ()      Cerebral vascular disease (5/30/2017)      H/O: stroke (6/2/2018)      Depression with anxiety (11/19/2020)      CAD (coronary artery disease) ()      Overview: multivessel      HTN (hypertension) ()      Hx pulmonary embolism (6/2018)      Leukocytosis (12/15/2021)      Bifascicular block ()      Nausea & vomiting (12/15/2021)      Gastroenteritis (12/15/2021)      ANDRE (acute kidney injury) ([facility] [address] (12/15/2021)      Assessment:  (GI consult for nausea, vomiting, and diarrhea in this 59 y/o male. Recently seen by Dr. Sarkis Bailey in the office and EGD and colonoscopy scheduled for 2/7/2022.  Ultrasound also ordered. Complained of severe constipation at time of visit and escalating laxative directions combined with stool softeners were given for him to start. Diarrhea started the next day. Stool studies pending. Pain is epigastric. No overt signs of GI bleeding. Changed to PPI from H2B at office visit. History of diabetic neuropathy. Reports intermittent episodes of nausea, vomiting, epigastric pain lasting 1-2 days for the last couple of years. Vomits food eaten hours ago and has sour belches. A1c 8. No history of CCY. CT without contrast without acute findings. Labs normal except one set of labs show ANDRE. No NSAID use. Takes Plavix daily. No alcohol or marijuana use. 12/17/2021: Much better today. No nausea or vomiting. No abdominal pain. Tolerating full liquids. Unable to do GES due to equipment issues. Considered EGD today but he ate all his breakfast and is so much better. He believes his Trulicity caused his symptoms because he started it about 1 month ago--yet he told me yesterday this has been going on for over a year. Still having diarrhea but SS negative. He states his diarrhea started after taking the MOM recommended by Dr. Harish Mi. Diarrhea likely due to laxative recommendations. ). Plan:   (- Will advance diet to GI bland. [address] [address] for discharge from GI perspective if tolerates. - Would continue PPI BID at least until EGD  - Can hold off on GES for now, may order at some point as OP  - Recommend daily Miralax for constipation    I will contact our office to try to move his procedures up from February. They will be contacting him. ).        Electronically signed by Aleshia Marshall NP on 12/17/2021 at 9:13 AM

## 2021-12-17 NOTE — DISCHARGE SUMMARY
[address]urt Fig[address]a [address]  [address] [facility], [address] [place] [address]  Tel: [phone]    Physician Discharge Summary    Patient ID:    Alex Leal  Age:              60 y.o.    : 1965  MRN:             [id number]     PCP: Geovanny Christine MD     Date of Admission: 12/15/2021    Date of Discharge:  2021    Principal admission Diagnosis:   Nausea & vomiting [R11.2]    Discharge Diagnoses:  Principal Problem:    Nausea & vomiting (12/15/2021)    Gastroenteritis (12/15/2021)    ANDRE (acute kidney injury) ([address] (12/15/2021)    BPH (benign prostatic hyperplasia) (2013)    Dyslipidemia (2013): Overview: 13 Lipid panel   HDL 25     Neuropathy     DM type 2 causing vascular disease ([address]     DM type 2 causing neurological disease ([address]     Lupus (HCC)     Thoracic outlet syndrome     DM type 2 causing renal disease ([address]     CKD (chronic kidney disease), stage III ([address]     Cerebral vascular disease (2017)    H/O: stroke (2018)    Depression with anxiety (2020)    CAD (coronary artery disease): Overview: multivessel    HTN (hypertension)     Hx pulmonary embolism (2018)    Bifascicular block     Hospital Course:     Mr. Liana Collins is a 60 y.o. admitted to [address] [place] [address] and treated for the following:    Nausea & vomiting diarrhea / Gastroenteritis / Leukocytosis POA: presumed viral and has resolved. No sign of bacterial infection based on labs, vitals or CT. Stool studies all neg. Seen by Gi who recommended outpatient testing. Tolerated diet well and was discharged home in stable condition.      ANDRE (acute kidney injury) on CKD (chronic kidney disease), stage III POA: due to volume depletion and improved with IV fluids.      DM type 2 causing vascular, renal and neurological disease POA: resume DM diet, Metformin and home insulin.      CAD (coronary artery disease) / HTN (hypertension) / Bifascicular block POA: remained stable. Continue Norvasc, ASA, Plavix, Clonidine, Metoprolol     Depression with anxiety / ADHD POA: stable. Continue mirtazapine, fluoxetine and vyvance     Hx pulmonary embolism - Not on anticoagulation.  Negative DDimer     BPH (benign prostatic hyperplasia) - Continue flomax     Dyslipidemia - Continue atorvastatin     Neuropathy - Continue lyrica     Lupus - Not on meds.  Dx per chart.     Thoracic outlet syndrome - stable      Hx Cerebral vascular disease - Continue ASA, statin    Discharge Exam:    Visit Vitals  BP (!) 140/85 (BP 1 Location: Right upper arm, BP Patient Position: At rest)   Pulse 66   Temp 98 °F (36.7 °C)   Resp 18   Ht 5' 11\" (1.803 m)   Wt 87.1 kg (192 lb)   SpO2 98%   BMI 26.78 kg/m²        Patient has been seen and examined. General: well looking and in no acute distress  Pulm: clear breath sounds without wheezes  Card: no murmurs, normal S1, S2 without thrills, bruits   Abd:    soft, non-tender, normoactive bowel sounds  Skin: no rashes and skin turgor is good  Neuro: awake, alert and has a non focal     Activity: Activity as tolerated    Diet: Diabetic Diet    Current Discharge Medication List      START taking these medications    Details   pantoprazole (PROTONIX) 40 mg tablet Take 1 Tablet by mouth daily for 30 days. Qty: 30 Tablet, Refills: 0         CONTINUE these medications which have NOT CHANGED    Details   cloNIDine HCL (CATAPRES) 0.3 mg tablet Take 0.3 mg by mouth three (3) times daily. olmesartan (BENICAR) 40 mg tablet Take 40 mg by mouth daily. insulin lispro (HumaLOG U-100 Insulin) 100 unit/mL injection by SubCUTAneous route Before breakfast, lunch, and dinner. Sliding scale depending on B-200 = 3 units  201-250 = 4 units  251-300 = 6 units  301-350 = 8 units  351-400 = 10 units  >400 = call MD      Wheat Dextrin (Benefiber Clear) 3 gram/3.5 gram pwpk Take 3 g by mouth every evening.       biotin 10,000 mcg cap Take 10,000 mcg by mouth daily.      bisacodyL 5 mg tab Take 5 mg by mouth daily. OTHER,NON-FORMULARY, Take 1,250 mg by mouth every evening. Black Seed OTC      glucosamine/msm/chondrt/C/hyal (GLUCOSAMINE-CHONDROITIN-MSM PO) Take 1 Tablet by mouth daily. !! OTHER,NON-FORMULARY, Take 500 mg by mouth daily. Lion's shabnam mushroom OTC      magnesium citrate 100 mg tab Take 200 mg by mouth daily. cholecalciferol (VITAMIN D3) (5000 Units/125 mcg) tab tablet Take 5,000 Units by mouth daily. acetaminophen (TYLENOL) 500 mg tablet Take 1,000 mg by mouth every six (6) hours as needed for Pain.      vit C/E/Zn/coppr/lutein/zeaxan (PRESERVISION AREDS-2 PO) Take 1 Capsule by mouth daily. multivitamin, tx-iron-ca-min (THERA-M w/ IRON) 9 mg iron-400 mcg tab tablet Take 1 Tablet by mouth daily. amLODIPine (NORVASC) 10 mg tablet Take 10 mg by mouth every evening. metFORMIN (GLUMETZA ER) 500 mg TG24 24 hour tablet Take 1,000 mg by mouth two (2) times a day. aspirin delayed-release 81 mg tablet Take 1 Tab by mouth daily. Associated Diagnoses: CAD in native artery      atorvastatin (LIPITOR) 80 mg tablet Take 80 mg by mouth every evening. lisdexamfetamine (VYVANSE) 30 mg capsule Take 30 mg by mouth daily as needed. FLUoxetine (PROZAC) 10 mg tablet Take 10 mg by mouth daily. metoprolol succinate (TOPROL-XL) 100 mg tablet Take 100 mg by mouth daily. mirtazapine (REMERON) 15 mg tablet Take 15 mg by mouth nightly. insulin lispro protamine/insulin lispro (HUMALOG MIX 75-25) 100 unit/mL (75-25) injection 35 Units by SubCUTAneous route Before breakfast and dinner. Based on BG      cyclobenzaprine (FLEXERIL) 10 mg tablet Take 10 mg by mouth two (2) times a day. clopidogrel (PLAVIX) 75 mg tablet Take 1 Tab by mouth daily. Qty: 30 Tab, Refills: 2      tamsulosin (FLOMAX) 0.4 mg capsule Take 0.4 mg by mouth every evening. traZODone (DESYREL) 50 mg tablet Take 50 mg by mouth as needed. STOP taking these medications       dulaglutide (Trulicity) 1.5 WW/0.8 mL sub-q pen Comments:   Reason for Stopping: Follow-up Information     Follow up With Specialties Details Why [address]  [phone]            Follow-up tests or labs: None    Discharge Condition: Stable    Disposition: home    Time taken to arrange discharge:  35 minutes.     Signed:  Juany Valdovinos MD     [facility]  12/17/2021   1:08 PM

## 2021-12-17 NOTE — PROGRESS NOTES
Verbal shift change report given to [facility] (oncoming nurse) by Candida Lou RN (offgoing nurse). Report included the following information SBAR, Kardex, Intake/Output, MAR and Recent Results.

## 2021-12-23 ENCOUNTER — HOSPITAL ENCOUNTER (OUTPATIENT)
Dept: PREADMISSION TESTING | Age: 56
Discharge: HOME | End: 2021-12-23
Attending: INTERNAL MEDICINE
Payer: COMMERCIAL

## 2021-12-23 ENCOUNTER — TRANSCRIBE ORDER (OUTPATIENT)
Dept: REGISTRATION | Age: 56
End: 2021-12-23

## 2021-12-23 DIAGNOSIS — U07.1 COVID-19: Primary | ICD-10-CM

## 2021-12-23 DIAGNOSIS — U07.1 COVID-19: ICD-10-CM

## 2021-12-23 PROCEDURE — U0005 INFEC AGEN DETEC AMPLI PROBE: HCPCS

## 2021-12-26 LAB
SARS-COV-2, XPLCVT: NOT DETECTED
SOURCE, COVRS: NORMAL

## 2021-12-29 ENCOUNTER — ANESTHESIA EVENT (OUTPATIENT)
Dept: ENDOSCOPY | Age: 56
End: 2021-12-29
Payer: COMMERCIAL

## 2021-12-29 ENCOUNTER — ANESTHESIA (OUTPATIENT)
Dept: ENDOSCOPY | Age: 56
End: 2021-12-29
Payer: COMMERCIAL

## 2021-12-29 ENCOUNTER — HOSPITAL ENCOUNTER (OUTPATIENT)
Age: 56
Setting detail: OUTPATIENT SURGERY
Discharge: HOME | End: 2021-12-29
Attending: INTERNAL MEDICINE | Admitting: INTERNAL MEDICINE
Payer: COMMERCIAL

## 2021-12-29 VITALS
TEMPERATURE: 99.1 F | HEIGHT: 72 IN | WEIGHT: 196.4 LBS | DIASTOLIC BLOOD PRESSURE: 99 MMHG | HEART RATE: 83 BPM | BODY MASS INDEX: 26.6 KG/M2 | RESPIRATION RATE: 16 BRPM | SYSTOLIC BLOOD PRESSURE: 158 MMHG | OXYGEN SATURATION: 95 %

## 2021-12-29 LAB
GLUCOSE BLD STRIP.AUTO-MCNC: 155 MG/DL (ref 65–117)
GLUCOSE BLD STRIP.AUTO-MCNC: 164 MG/DL (ref 65–117)
SERVICE CMNT-IMP: ABNORMAL
SERVICE CMNT-IMP: ABNORMAL

## 2021-12-29 PROCEDURE — 74011250636 HC RX REV CODE- 250/636: Performed by: INTERNAL MEDICINE

## 2021-12-29 PROCEDURE — 76040000019: Performed by: INTERNAL MEDICINE

## 2021-12-29 PROCEDURE — 77030021593 HC FCPS BIOP ENDOSC BSC -A: Performed by: INTERNAL MEDICINE

## 2021-12-29 PROCEDURE — 74011250636 HC RX REV CODE- 250/636: Performed by: NURSE ANESTHETIST, CERTIFIED REGISTERED

## 2021-12-29 PROCEDURE — 88305 TISSUE EXAM BY PATHOLOGIST: CPT

## 2021-12-29 PROCEDURE — 76060000031 HC ANESTHESIA FIRST 0.5 HR: Performed by: INTERNAL MEDICINE

## 2021-12-29 PROCEDURE — 82962 GLUCOSE BLOOD TEST: CPT

## 2021-12-29 PROCEDURE — 77030013992 HC SNR POLYP ENDOSC BSC -B: Performed by: INTERNAL MEDICINE

## 2021-12-29 PROCEDURE — 2709999900 HC NON-CHARGEABLE SUPPLY: Performed by: INTERNAL MEDICINE

## 2021-12-29 PROCEDURE — 74011000250 HC RX REV CODE- 250: Performed by: NURSE ANESTHETIST, CERTIFIED REGISTERED

## 2021-12-29 RX ORDER — PROPOFOL 10 MG/ML
INJECTION, EMULSION INTRAVENOUS AS NEEDED
Status: DISCONTINUED | OUTPATIENT
Start: 2021-12-29 | End: 2021-12-29 | Stop reason: HOSPADM

## 2021-12-29 RX ORDER — FLUMAZENIL 0.1 MG/ML
0.2 INJECTION INTRAVENOUS
Status: DISCONTINUED | OUTPATIENT
Start: 2021-12-29 | End: 2021-12-29 | Stop reason: HOSPADM

## 2021-12-29 RX ORDER — DEXTROMETHORPHAN/PSEUDOEPHED 2.5-7.5/.8
40 DROPS ORAL
Status: DISCONTINUED | OUTPATIENT
Start: 2021-12-29 | End: 2021-12-29 | Stop reason: HOSPADM

## 2021-12-29 RX ORDER — SODIUM CHLORIDE 0.9 % (FLUSH) 0.9 %
5-40 SYRINGE (ML) INJECTION EVERY 8 HOURS
Status: DISCONTINUED | OUTPATIENT
Start: 2021-12-29 | End: 2021-12-29 | Stop reason: HOSPADM

## 2021-12-29 RX ORDER — ATROPINE SULFATE 0.1 MG/ML
0.5 INJECTION INTRAVENOUS
Status: DISCONTINUED | OUTPATIENT
Start: 2021-12-29 | End: 2021-12-29 | Stop reason: HOSPADM

## 2021-12-29 RX ORDER — DEXTROMETHORPHAN/PSEUDOEPHED 2.5-7.5/.8
1.2 DROPS ORAL
Status: DISCONTINUED | OUTPATIENT
Start: 2021-12-29 | End: 2021-12-29 | Stop reason: HOSPADM

## 2021-12-29 RX ORDER — SODIUM CHLORIDE 0.9 % (FLUSH) 0.9 %
5-40 SYRINGE (ML) INJECTION AS NEEDED
Status: DISCONTINUED | OUTPATIENT
Start: 2021-12-29 | End: 2021-12-29 | Stop reason: HOSPADM

## 2021-12-29 RX ORDER — LIDOCAINE HYDROCHLORIDE 20 MG/ML
INJECTION, SOLUTION EPIDURAL; INFILTRATION; INTRACAUDAL; PERINEURAL AS NEEDED
Status: DISCONTINUED | OUTPATIENT
Start: 2021-12-29 | End: 2021-12-29 | Stop reason: HOSPADM

## 2021-12-29 RX ORDER — SODIUM CHLORIDE 9 MG/ML
50 INJECTION, SOLUTION INTRAVENOUS CONTINUOUS
Status: DISCONTINUED | OUTPATIENT
Start: 2021-12-29 | End: 2021-12-29 | Stop reason: HOSPADM

## 2021-12-29 RX ORDER — SODIUM CHLORIDE 9 MG/ML
INJECTION, SOLUTION INTRAVENOUS
Status: DISCONTINUED | OUTPATIENT
Start: 2021-12-29 | End: 2021-12-29 | Stop reason: HOSPADM

## 2021-12-29 RX ORDER — EPINEPHRINE 0.1 MG/ML
1 INJECTION INTRACARDIAC; INTRAVENOUS
Status: DISCONTINUED | OUTPATIENT
Start: 2021-12-29 | End: 2021-12-29 | Stop reason: HOSPADM

## 2021-12-29 RX ORDER — NALOXONE HYDROCHLORIDE 0.4 MG/ML
0.4 INJECTION, SOLUTION INTRAMUSCULAR; INTRAVENOUS; SUBCUTANEOUS
Status: DISCONTINUED | OUTPATIENT
Start: 2021-12-29 | End: 2021-12-29 | Stop reason: HOSPADM

## 2021-12-29 RX ADMIN — PROPOFOL 10 MG: 10 INJECTION, EMULSION INTRAVENOUS at 14:04

## 2021-12-29 RX ADMIN — PROPOFOL 30 MG: 10 INJECTION, EMULSION INTRAVENOUS at 14:11

## 2021-12-29 RX ADMIN — PROPOFOL 20 MG: 10 INJECTION, EMULSION INTRAVENOUS at 14:06

## 2021-12-29 RX ADMIN — PROPOFOL 20 MG: 10 INJECTION, EMULSION INTRAVENOUS at 14:01

## 2021-12-29 RX ADMIN — LIDOCAINE HYDROCHLORIDE 100 MG: 20 INJECTION, SOLUTION EPIDURAL; INFILTRATION; INTRACAUDAL; PERINEURAL at 13:58

## 2021-12-29 RX ADMIN — PROPOFOL 100 MG: 10 INJECTION, EMULSION INTRAVENOUS at 13:58

## 2021-12-29 RX ADMIN — PROPOFOL 20 MG: 10 INJECTION, EMULSION INTRAVENOUS at 14:00

## 2021-12-29 RX ADMIN — PROPOFOL 30 MG: 10 INJECTION, EMULSION INTRAVENOUS at 14:09

## 2021-12-29 RX ADMIN — PROPOFOL 30 MG: 10 INJECTION, EMULSION INTRAVENOUS at 14:02

## 2021-12-29 RX ADMIN — SODIUM CHLORIDE: 900 INJECTION, SOLUTION INTRAVENOUS at 13:58

## 2021-12-29 RX ADMIN — PROPOFOL 20 MG: 10 INJECTION, EMULSION INTRAVENOUS at 14:03

## 2021-12-29 RX ADMIN — PROPOFOL 30 MG: 10 INJECTION, EMULSION INTRAVENOUS at 14:19

## 2021-12-29 RX ADMIN — PROPOFOL 40 MG: 10 INJECTION, EMULSION INTRAVENOUS at 14:16

## 2021-12-29 RX ADMIN — PROPOFOL 50 MG: 10 INJECTION, EMULSION INTRAVENOUS at 13:59

## 2021-12-29 NOTE — DISCHARGE INSTRUCTIONS
118 [facility].  [address]  [facility]  [id number]  1965    DISCOMFORT:  Redness at IV site- apply warm compress to area; if redness or soreness persist- contact your physician  There may be a slight amount of blood passed from the rectum  Gaseous discomfort- walking, belching will help relieve any discomfort    DIET:   High fiber diet. GERD diet: avoid fried and fatty foods. peppermint, chocolate, alcohol, coffee, citrus fruits and juices, tomoato products; avoid lying down for 2 to 3 hours after eating.   - however -  remember your colon is empty and a heavy meal will produce gas. Avoid these foods:  vegetables, fried / greasy foods, carbonated drinks for today   You may not  drink alcoholic beverages for at least 12 hours      ACTIVITY  Spend the remainder of the day resting -  avoid any strenuous activity, then you may resume your normal daily activities   You may not operate a vehicle for 12 hours  You may not  engage in an occupation involving machinery or appliances for rest of today  Avoid making any critical decisions for at least 24 hour    CALL M.D. ANY SIGN OF   Increasing pain, nausea, vomiting  Abdominal distension (swelling)  New increased bleeding (oral or rectal)  Fever (chills)  Pain in chest area  Bloody discharge from nose or mouth  Shortness of breath    You may not  take any Advil, Aspirin, Ibuprofen, Motrin, Aleve, or Goodys for 7 days, ONLY  Tylenol as needed for pain. Post procedure diagnosis: gastritis, hiatal hernia, colon polyp, rare diverticuli, int. hemorrhoids      Post-procedure recommendations:   -Await pathology. -Repeat colonoscopy in 3 years.  -High fiber diet.     -Resume normal medication(s). -Begin Plavix on 12/31/21     Follow-up Instructions:   Call Dr. Luigi Underwood for any questions or problems.      If we took a biopsy please call the office within 2 weeks to discuss your pathology results. Telephone # [phone]     Patient Education   Patient Education        High-Fiber Diet: Care Instructions  Overview     A high-fiber diet may help you relieve constipation and feel less bloated. Your doctor and dietitian will help you make a high-fiber eating plan based on your personal needs. The plan will include the things you like to eat. It will also make sure that you get 25 to 35 grams of fiber a day. Before you make changes to the way you eat, be sure to talk with your doctor or dietitian. Follow-up care is a key part of your treatment and safety. Be sure to make and go to all appointments, and call your doctor if you are having problems. It's also a good idea to know your test results and keep a list of the medicines you take. How can you care for yourself at home? · You can increase how much fiber you get if you eat more of certain foods. These foods include:  ? Whole-grain breads and cereals. ? Fruits, such as pears, apples, and peaches. Eat the skins and peels if you can.  ? Vegetables, such as broccoli, cabbage, spinach, carrots, asparagus, and squash. ? Starchy vegetables. These include potatoes with skins, kidney beans, and lima beans. · Take a fiber supplement every day if your doctor recommends it. Examples are Benefiber, Citrucel, FiberCon, and Metamucil. Ask your doctor how much to take. · Drink plenty of fluids. If you have kidney, heart, or liver disease and have to limit fluids, talk with your doctor before you increase the amount of fluids you drink. Where can you learn more? Go to http://www.gray.com/  Enter R874 in the search box to learn more about \"High-Fiber Diet: Care Instructions. \"  Current as of: December 17, 2020               Content Version: 13.0  © 2006-2021 Healthwise, Incorporated.    Care instructions adapted under license by [employer] (which disclaims liability or warranty for this information). If you have questions about a medical condition or this instruction, always ask your healthcare professional. [address] any warranty or liability for your use of this information. Diverticulosis: Care Instructions  Your Care Instructions  In diverticulosis, pouches called diverticula form in the wall of the large intestine (colon). The pouches do not cause any pain or other symptoms. Most people who have diverticulosis do not know they have it. But the pouches sometimes bleed, and if they become infected, they can cause pain and other symptoms. When this happens, it is called diverticulitis. Diverticula form when pressure pushes the wall of the colon outward at certain weak points. A diet that is too low in fiber can cause diverticula. Follow-up care is a key part of your treatment and safety. Be sure to make and go to all appointments, and call your doctor if you are having problems. It's also a good idea to know your test results and keep a list of the medicines you take. How can you care for yourself at home? · Include fruits, leafy green vegetables, beans, and whole grains in your diet each day. These foods are high in fiber. · Take a fiber supplement, such as Citrucel or Metamucil, every day if needed. Read and follow all instructions on the label. · Drink plenty of fluids. If you have kidney, heart, or liver disease and have to limit fluids, talk with your doctor before you increase the amount of fluids you drink. · Get at least 30 minutes of exercise on most days of the week. Walking is a good choice. You also may want to do other activities, such as running, swimming, cycling, or playing tennis or team sports. · Cut out foods that cause gas, pain, or other symptoms. When should you call for help?    Call your doctor now or seek immediate medical care if:    · You have belly pain.     · You pass maroon or very bloody stools.     · You have a fever.     · You have nausea and vomiting.     · You have unusual changes in your bowel movements or abdominal swelling.     · You have burning pain when you urinate.     · You have abnormal vaginal discharge.     · You have shoulder pain.     · You have cramping pain that does not get better when you have a bowel movement or pass gas.     · You pass gas or stool from your urethra while urinating. Watch closely for changes in your health, and be sure to contact your doctor if you have any problems. Where can you learn more? Go to http://www.gray.com/  Enter [id number] in the search box to learn more about \"Diverticulosis: Care Instructions. \"  Current as of: February 10, 2021               Content Version: 13.0  © 1763-4338 [employer]. Care instructions adapted under license by [employer] (which disclaims liability or warranty for this information). If you have questions about a medical condition or this instruction, always ask your healthcare professional. [place] [address] any warranty or liability for your use of this information. Learning About Coronavirus [phone])  Coronavirus [phone]): Overview  What is coronavirus (COVID-19)? The coronavirus disease (COVID-19) is caused by a virus. It is an illness that was first found in [place], [place], in December 2019. It has since spread worldwide. The virus can cause fever, cough, and trouble breathing. In severe cases, it can cause pneumonia and make it hard to breathe without help. It can cause death. Coronaviruses are a large group of viruses. They cause the common cold. They also cause more serious illnesses like Middle East respiratory syndrome (MERS) and severe acute respiratory syndrome (SARS). COVID-19 is caused by a novel coronavirus. That means it's a new type that has not been seen in people before. This virus spreads person-to-person through droplets from coughing and sneezing. It can also spread when you are close to someone who is infected. And it can spread when you touch something that has the virus on it, such as a doorknob or a tabletop. What can you do to protect yourself from coronavirus (COVID-19)? The best way to protect yourself from getting sick is to:  · Avoid areas where there is an outbreak. · Avoid contact with people who may be infected. · Wash your hands often with soap or alcohol-based hand sanitizers. · Avoid crowds and try to stay at least 6 feet away from other people. · Wash your hands often, especially after you cough or sneeze. Use soap and water, and scrub for at least 20 seconds. If soap and water aren't available, use an alcohol-based hand . · Avoid touching your mouth, nose, and eyes. What can you do to avoid spreading the virus to others? To help avoid spreading the virus to others:  · Cover your mouth with a tissue when you cough or sneeze. Then throw the tissue in the trash. · Use a disinfectant to clean things that you touch often. · Stay home if you are sick or have been exposed to the virus. Don't go to school, work, or public areas. And don't use public transportation. · If you are sick:  ? Leave your home only if you need to get medical care. But call the doctor's office first so they know you're coming. And wear a face mask, if you have one.  ? If you have a face mask, wear it whenever you're around other people. It can help stop the spread of the virus when you cough or sneeze. ? Clean and disinfect your home every day. Use household  and disinfectant wipes or sprays. Take special care to clean things that you grab with your hands. These include doorknobs, remote controls, phones, and handles on your refrigerator and microwave. And don't forget countertops, tabletops, bathrooms, and computer keyboards. When to call for help  Call 911 anytime you think you may need emergency care.  For example, call if:  · You have severe trouble breathing. (You can't talk at all.)  · You have constant chest pain or pressure. · You are severely dizzy or lightheaded. · You are confused or can't think clearly. · Your face and lips have a blue color. · You pass out (lose consciousness) or are very hard to wake up. Call your doctor now if you develop symptoms such as:  · Shortness of breath. · Fever. · Cough. If you need to get care, call ahead to the doctor's office for instructions before you go. Make sure you wear a face mask, if you have one, to prevent exposing other people to the virus. Where can you get the latest information? The following health organizations are tracking and studying this virus. Their websites contain the most up-to-date information. Leidy Murray also learn what to do if you think you may have been exposed to the virus. · U.S. Centers for Disease Control and Prevention (CDC): The CDC provides updated news about the disease and travel advice. The website also tells you how to prevent the spread of infection. www.cdc.gov  · World Health Organization [facility]): WHO offers information about the virus outbreaks. WHO also has travel advice. www.who.int  Current as of: April 1, 2020               Content Version: 12.4  © 5381-3284 [employer]. Care instructions adapted under license by your healthcare professional. If you have questions about a medical condition or this instruction, always ask your healthcare professional. [address] any warranty or liability for your use of this information.

## 2021-12-29 NOTE — H&P
[address] [facility] [address]  217 [facility] 140 [facility], [address]  [phone]                                History and Physical     NAME: Kayley Benavidez   :  1965   MRN:  [id number]     HPI:  The patient was seen and examined. Past Surgical History:   Procedure Laterality Date    HX ORTHOPAEDIC      L. knee arthroscopy    AL CARDIAC SURG PROCEDURE UNLIST      stent     Past Medical History:   Diagnosis Date    ADHD     Anxiety and depression     Bifascicular block     BPH (benign prostatic hyperplasia)     CAD (coronary artery disease)     multivessel    CKD (chronic kidney disease), stage III (HCC)     DM type 2 causing neurological disease ([address]     DM type 2 causing renal disease [facility]     DM type 2 causing vascular disease (HCC)     Dyslipidemia     HTN (hypertension)     Hx pulmonary embolism 2018    Insomnia     Lupus ([facility]     Neuropathy     Thoracic outlet syndrome      Social History     Tobacco Use    Smoking status: Never Smoker    Smokeless tobacco: Never Used   Substance Use Topics    Alcohol use: No    Drug use: No     Allergies   Allergen Reactions    Iodinated Contrast Media Anaphylaxis     Family History   Problem Relation Age of Onset    Heart Disease Mother     Stroke Mother     Diabetes Father     Stroke Father     Heart Disease Father      No current facility-administered medications for this encounter. PHYSICAL EXAM:  General: WD, WN. Alert, cooperative, no acute distress    HEENT: NC, Atraumatic. PERRLA, EOMI. Anicteric sclerae. Lungs:  CTA Bilaterally. No Wheezing/Rhonchi/Rales. Heart:  Regular  rhythm,  No murmur, No Rubs, No Gallops  Abdomen: Soft, Non distended, Non tender. +Bowel sounds, no HSM  Extremities: No c/c/e  Neurologic:  CN 2-12 gi, Alert and oriented X 3. No acute neurological distress   Psych:   Good insight. Not anxious nor agitated.     The heart, lungs and mental status were satisfactory for the administration of MAC sedation and for the procedure. Mallampati score: 2     The patient was counseled at length about the risks of masha Covid-19 in the jay-operative and post-operative states including the recovery window of their procedure. The patient was made aware that masha Covid-19 after a surgical procedure may worsen their prognosis for recovering from the virus and lend to a higher morbidity and or mortality risk. The patient was given the options of postponing their procedure. All of the risks, benefits, and alternatives were discussed. The patient does  wish to proceed with the procedure.       Assessment:   · Nausea & vomiting  · Diarrhea    Plan:   · Endoscopic procedure  · MAC sedation

## 2021-12-29 NOTE — PROCEDURES
[address] [facility] [address]  [address] [place], [address]  [phone]                              Colonoscopy Procedure Note      Indications:    Diarrhea     :  Kari Cunningham MD    Surgical Assistant: Endoscopy Technician-1: Yvon Cohen  Endoscopy RN-1: Cecilia Morfin RN    Implants: none    Referring Provider: Fidencio Zaman MD    Sedation:  MAC anesthesia    Procedure Details:  After informed consent was obtained with all risks and benefits of procedure explained and preoperative exam completed, the patient was taken to the endoscopy suite and placed in the left lateral decubitus position. Upon sequential sedation as per above, a digital rectal exam was performed  And was normal.  The Olympus videocolonoscope  was inserted in the rectum and carefully advanced to the cecum, which was identified by the ileocecal valve and appendiceal orifice. The quality of preparation was good. The colonoscope was slowly withdrawn with careful evaluation between folds. Retroflexion in the rectum was performed and was normal..     Findings:   Rectum: no mucosal lesion appreciated  Grade 1 internal hemorrhoid(s) with skin tags;  Sigmoid: no mucosal lesion appreciated      - Diverticulum  Descending Colon: no mucosal lesion appreciated  Transverse Colon: 1  Sessile polyp(s), the largest 10 mm in size is noted at splenic flexure; Ascending Colon: no mucosal lesion appreciated  Few small diverticula were noted  Cecum: no mucosal lesion appreciated  Terminal Ileum: not intubated    Interventions:  1 complete polypectomy were performed using hot snare  and the polyps were  retrieved    Specimen Removed:    ID Type Source Tests Collected by Time Destination   1 : pathology Preservative Gastric  Kameron Valdes MD 12/29/2021 1404 Pathology   2 : colon Preservative Splenic Flexure  Kameron Valdes MD 12/29/2021 1413 Pathology       Complications: None. EBL:  None.     Recommendations: -Await pathology. -Repeat colonoscopy in 3 years.  -High fiber diet.     -Resume normal medication(s). -Begin Plavix on 12/31/21     Discharge Disposition:  Home in the company of a  when able to ambulate.     Mackenzie Jorgensen MD  12/29/2021  2:29 PM

## 2021-12-29 NOTE — ANESTHESIA PREPROCEDURE EVALUATION
Relevant Problems   No relevant active problems       Anesthetic History     PONV          Review of Systems / Medical History  Patient summary reviewed, nursing notes reviewed and pertinent labs reviewed    Pulmonary                   Neuro/Psych         Psychiatric history     Cardiovascular    Hypertension: well controlled        Dysrhythmias   CAD    Exercise tolerance: >4 METS     GI/Hepatic/Renal                Endo/Other    Diabetes         Other Findings              Physical Exam    Airway  Mallampati: I  TM Distance: > 6 cm  Neck ROM: normal range of motion   Mouth opening: Normal     Cardiovascular    Rhythm: regular           Dental  No notable dental hx       Pulmonary  Breath sounds clear to auscultation               Abdominal         Other Findings            Anesthetic Plan    ASA: 2  Anesthesia type: MAC          Induction: Intravenous  Anesthetic plan and risks discussed with: Patient

## 2021-12-29 NOTE — ANESTHESIA POSTPROCEDURE EVALUATION
Post-Anesthesia Evaluation and Assessment    Patient: Vanessa Barragan MRN: [id number]  SSN: [id number]    YOB: 1965  Age: 60 y.o. Sex: male      I have evaluated the patient and they are stable and ready for discharge from the PACU. Cardiovascular Function/Vital Signs  Visit Vitals  BP (!) 158/99   Pulse 83   Temp 37.3 °C (99.1 °F)   Resp 16   Ht 5' 11.5\" (1.816 m)   Wt 89.1 kg (196 lb 6.4 oz)   SpO2 95%   BMI 27.01 kg/m²       Patient is status post MAC anesthesia for Procedure(s):  COLONOSCOPY  egd    :-  ESOPHAGOGASTRODUODENAL (EGD) BIOPSY  ENDOSCOPIC POLYPECTOMY. Nausea/Vomiting: None    Postoperative hydration reviewed and adequate. Pain:  Pain Scale 1: Numeric (0 - 10) (12/29/21 1440)  Pain Intensity 1: 0 (12/29/21 1440)   Managed    Neurological Status: At baseline    Mental Status, Level of Consciousness: Alert and  oriented to person, place, and time    Pulmonary Status:   O2 Device: None (Room air) (12/29/21 1440)   Adequate oxygenation and airway patent    Complications related to anesthesia: None    Post-anesthesia assessment completed. No concerns    Signed By: Fatou Cerrato MD     December 29, 2021              Procedure(s):  COLONOSCOPY  egd    :-  ESOPHAGOGASTRODUODENAL (EGD) BIOPSY  ENDOSCOPIC POLYPECTOMY. MAC    <BSHSIANPOST>    INITIAL Post-op Vital signs:   Vitals Value Taken Time   BP 97/81 12/29/21 1445   Temp 37.3 °C (99.1 °F) 12/29/21 1427   Pulse 75 12/29/21 1452   Resp 17 12/29/21 1452   SpO2 97 % 12/29/21 1452   Vitals shown include unvalidated device data.

## 2021-12-29 NOTE — PROCEDURES
118 [facility].  217 [facility] 140 [place]  [place], [address]  [phone]                            NAME:  Edy London   :   1965   MRN:   [id number]     Date/Time:  2021 2:27 PM    Esophagogastroduodenoscopy (EGD) Procedure Note    :  Amor Hernandez MD    Staff: Endoscopy Technician-1: Jeane Miller  Endoscopy RN-1: Eligio Ward RN     Implants: none    Referring Provider:  Liliam Wu MD    Anethesia/Sedation:  MAC anesthesia    Procedure Details     After infom consent was obtained for the procedure, with all risks and benefits of procedure explained the patient was taken to the endoscopy suite and placed in the left lateral decubitus position. Following sequential administration of sedation as per above, the [id number] gastroscope was inserted into the mouth and advanced under direct vision to second portion of the duodenum. A careful inspection was made as the gastroscope was withdrawn, including a retroflexed view of the proximal stomach; findings and interventions are described below. Findings:  Esophagus: Normal mucosa. Small sliding hiatal hernia was noted with Z-line at 38 cm and diaphragmatic pinch at 40 cm  Stomach:mild erythema was noted in  body, antrum  Duodenum/jejunum:normal      Therapies:  biopsy of stomach body, antrum    Specimens: biopsy of stomach body, antrum           EBL: None    Complications:   None; patient tolerated the procedure well. Impression:    See Postoperative diagnosis above    Recommendations:  -Continue acid suppression. , -Await pathology. , -Follow symptoms.  -Colonoscopy today    Discharge disposition:  Home in the company of  when able to ambulate    Amor Hernandez MD

## 2022-03-18 PROBLEM — N17.9 AKI (ACUTE KIDNEY INJURY) (HCC): Status: ACTIVE | Noted: 2021-12-15

## 2022-03-18 PROBLEM — Z86.73 H/O: STROKE: Status: ACTIVE | Noted: 2018-06-02

## 2022-03-19 PROBLEM — R11.2 NAUSEA & VOMITING: Status: ACTIVE | Noted: 2021-12-15

## 2022-03-19 PROBLEM — D72.829 LEUKOCYTOSIS: Status: ACTIVE | Noted: 2021-12-15

## 2022-03-19 PROBLEM — I67.9 CEREBRAL VASCULAR DISEASE: Status: ACTIVE | Noted: 2017-05-30

## 2022-03-19 PROBLEM — K52.9 GASTROENTERITIS: Status: ACTIVE | Noted: 2021-12-15

## 2022-03-19 PROBLEM — Z86.711 HX PULMONARY EMBOLISM: Status: ACTIVE | Noted: 2018-06-01

## 2022-03-20 PROBLEM — F41.8 DEPRESSION WITH ANXIETY: Status: ACTIVE | Noted: 2020-11-19

## 2022-08-04 NOTE — PROGRESS NOTES
Problem: Risk for Spread of Infection  Goal: Prevent transmission of infectious organism to others  Description: Prevent the transmission of infectious organisms to other patients, staff members, and visitors.   Outcome: Progressing Towards Goal Returned call to patient advised that referral was sent to Dr. Omar Dubon as patient requested previously and it is pending auth.

## 2022-08-08 ENCOUNTER — HOSPITAL ENCOUNTER (EMERGENCY)
Age: 57
Discharge: HOME | End: 2022-08-08
Attending: EMERGENCY MEDICINE
Payer: COMMERCIAL

## 2022-08-08 ENCOUNTER — APPOINTMENT (OUTPATIENT)
Dept: GENERAL RADIOLOGY | Age: 57
End: 2022-08-08
Attending: PHYSICIAN ASSISTANT
Payer: COMMERCIAL

## 2022-08-08 ENCOUNTER — APPOINTMENT (OUTPATIENT)
Dept: NUCLEAR MEDICINE | Age: 57
End: 2022-08-08
Attending: EMERGENCY MEDICINE
Payer: COMMERCIAL

## 2022-08-08 VITALS
OXYGEN SATURATION: 93 % | RESPIRATION RATE: 15 BRPM | SYSTOLIC BLOOD PRESSURE: 165 MMHG | DIASTOLIC BLOOD PRESSURE: 91 MMHG | WEIGHT: 216 LBS | TEMPERATURE: 99.3 F | BODY MASS INDEX: 29.71 KG/M2 | HEART RATE: 86 BPM

## 2022-08-08 DIAGNOSIS — E86.0 DEHYDRATION: ICD-10-CM

## 2022-08-08 DIAGNOSIS — R73.9 HYPERGLYCEMIA: ICD-10-CM

## 2022-08-08 DIAGNOSIS — R00.0 TACHYCARDIA: ICD-10-CM

## 2022-08-08 DIAGNOSIS — R06.02 SOB (SHORTNESS OF BREATH): ICD-10-CM

## 2022-08-08 DIAGNOSIS — F41.1 ANXIETY STATE: ICD-10-CM

## 2022-08-08 DIAGNOSIS — R07.89 CHEST TIGHTNESS: Primary | ICD-10-CM

## 2022-08-08 LAB
ALBUMIN SERPL-MCNC: 1.8 G/DL (ref 3.5–5)
ALBUMIN/GLOB SERPL: 0.3 {RATIO} (ref 1.1–2.2)
ALP SERPL-CCNC: 122 U/L (ref 45–117)
ALT SERPL-CCNC: 23 U/L (ref 12–78)
ANION GAP SERPL CALC-SCNC: 3 MMOL/L (ref 5–15)
AST SERPL-CCNC: 18 U/L (ref 15–37)
BASOPHILS # BLD: 0.1 K/UL (ref 0–0.1)
BASOPHILS NFR BLD: 1 % (ref 0–1)
BILIRUB SERPL-MCNC: 0.8 MG/DL (ref 0.2–1)
BNP SERPL-MCNC: 268 PG/ML
BUN SERPL-MCNC: 27 MG/DL (ref 6–20)
BUN/CREAT SERPL: 21 (ref 12–20)
CALCIUM SERPL-MCNC: 11 MG/DL (ref 8.5–10.1)
CHLORIDE SERPL-SCNC: 102 MMOL/L (ref 97–108)
CO2 SERPL-SCNC: 30 MMOL/L (ref 21–32)
COMMENT, HOLDF: NORMAL
CREAT SERPL-MCNC: 1.28 MG/DL (ref 0.7–1.3)
D DIMER PPP FEU-MCNC: 0.36 MG/L FEU (ref 0–0.65)
DIFFERENTIAL METHOD BLD: ABNORMAL
EOSINOPHIL # BLD: 0.1 K/UL (ref 0–0.4)
EOSINOPHIL NFR BLD: 1 % (ref 0–7)
ERYTHROCYTE [DISTWIDTH] IN BLOOD BY AUTOMATED COUNT: 13.4 % (ref 11.5–14.5)
GLOBULIN SER CALC-MCNC: 6.5 G/DL (ref 2–4)
GLUCOSE SERPL-MCNC: 236 MG/DL (ref 65–100)
HCT VFR BLD AUTO: 50.3 % (ref 36.6–50.3)
HGB BLD-MCNC: 17.4 G/DL (ref 12.1–17)
IMM GRANULOCYTES # BLD AUTO: 0 K/UL (ref 0–0.04)
IMM GRANULOCYTES NFR BLD AUTO: 0 % (ref 0–0.5)
LYMPHOCYTES # BLD: 1.3 K/UL (ref 0.8–3.5)
LYMPHOCYTES NFR BLD: 12 % (ref 12–49)
MCH RBC QN AUTO: 28.8 PG (ref 26–34)
MCHC RBC AUTO-ENTMCNC: 34.6 G/DL (ref 30–36.5)
MCV RBC AUTO: 83.3 FL (ref 80–99)
MONOCYTES # BLD: 0.5 K/UL (ref 0–1)
MONOCYTES NFR BLD: 5 % (ref 5–13)
NEUTS SEG # BLD: 8.3 K/UL (ref 1.8–8)
NEUTS SEG NFR BLD: 81 % (ref 32–75)
NRBC # BLD: 0 K/UL (ref 0–0.01)
NRBC BLD-RTO: 0 PER 100 WBC
PLATELET # BLD AUTO: 235 K/UL (ref 150–400)
PMV BLD AUTO: 9.5 FL (ref 8.9–12.9)
POTASSIUM SERPL-SCNC: 4.2 MMOL/L (ref 3.5–5.1)
PROT SERPL-MCNC: 8.3 G/DL (ref 6.4–8.2)
RBC # BLD AUTO: 6.04 M/UL (ref 4.1–5.7)
SAMPLES BEING HELD,HOLD: NORMAL
SODIUM SERPL-SCNC: 135 MMOL/L (ref 136–145)
TROPONIN-HIGH SENSITIVITY: 23 NG/L (ref 0–76)
TROPONIN-HIGH SENSITIVITY: 27 NG/L (ref 0–76)
WBC # BLD AUTO: 10.3 K/UL (ref 4.1–11.1)

## 2022-08-08 PROCEDURE — 83880 ASSAY OF NATRIURETIC PEPTIDE: CPT

## 2022-08-08 PROCEDURE — A9540 TC99M MAA: HCPCS

## 2022-08-08 PROCEDURE — 71045 X-RAY EXAM CHEST 1 VIEW: CPT

## 2022-08-08 PROCEDURE — 96360 HYDRATION IV INFUSION INIT: CPT

## 2022-08-08 PROCEDURE — 85025 COMPLETE CBC W/AUTO DIFF WBC: CPT

## 2022-08-08 PROCEDURE — 36415 COLL VENOUS BLD VENIPUNCTURE: CPT

## 2022-08-08 PROCEDURE — 84484 ASSAY OF TROPONIN QUANT: CPT

## 2022-08-08 PROCEDURE — 80053 COMPREHEN METABOLIC PANEL: CPT

## 2022-08-08 PROCEDURE — 99285 EMERGENCY DEPT VISIT HI MDM: CPT

## 2022-08-08 PROCEDURE — 93005 ELECTROCARDIOGRAM TRACING: CPT

## 2022-08-08 PROCEDURE — 85379 FIBRIN DEGRADATION QUANT: CPT

## 2022-08-08 PROCEDURE — 74011250636 HC RX REV CODE- 250/636: Performed by: EMERGENCY MEDICINE

## 2022-08-08 RX ADMIN — SODIUM CHLORIDE 1000 ML: 9 INJECTION, SOLUTION INTRAVENOUS at 17:44

## 2022-08-08 RX ADMIN — SODIUM CHLORIDE 500 ML: 900 INJECTION, SOLUTION INTRAVENOUS at 18:30

## 2022-08-08 NOTE — ED TRIAGE NOTES
Pt arrives to the ER for complaints of decreased energy, chest pain, and shortness of breath. Symptoms started about a month ago. PMH of pulmonary embolism in 2018, previous MI. H/o of HTN.

## 2022-08-08 NOTE — ED PROVIDER NOTES
52-year-old male with a history as below, presents to the emergency department complaining of fatigue, chest tightness and shortness of breath. He states that the symptoms have been intermittent for the last month but significantly worsened today when he came inside after doing some yard work today at about 3:00. At that time he noted chest tightness and shortness of breath and felt like his symptoms were very similar to his prior pulmonary embolism. He currently takes aspirin and Plavix but no other blood thinners. He denies any significant chest pain currently. Denies any recent cough or URI symptoms, fever, chills. No significant leg swelling or tenderness. On arrival to the emergency department he is anxious appearing and tachycardic to the 150s but is speaking full sentences. He states that he may have gotten dehydrated and overdone it working outside. He states that his symptoms have waxed and waned twice since their onset today at about 3:00 and this leads him to present to the ED for further evaluation. Chest Pain (Angina)   Associated symptoms include shortness of breath. Pertinent negatives include no abdominal pain, no back pain, no cough, no fever, no headaches, no nausea, no numbness, no vomiting and no weakness. Shortness of Breath  Associated symptoms include chest pain. Pertinent negatives include no fever, no headaches, no rhinorrhea, no sore throat, no neck pain, no cough, no vomiting, no abdominal pain and no rash.       Past Medical History:   Diagnosis Date    ADHD     Anxiety and depression     Bifascicular block     BPH (benign prostatic hyperplasia)     CAD (coronary artery disease)     multivessel    CKD (chronic kidney disease), stage III (HCC)     DM type 2 causing neurological disease ([address]     DM type 2 causing renal disease ([address]     DM type 2 causing vascular disease ([address]     Dyslipidemia     HTN (hypertension)     Hx pulmonary embolism 06/2018    Insomnia     Lupus [facility])     Neuropathy     Thoracic outlet syndrome        Past Surgical History:   Procedure Laterality Date    COLONOSCOPY N/A 12/29/2021    COLONOSCOPY performed by Benito Lawrence MD at [address]    L. knee arthroscopy    RI CARDIAC SURG PROCEDURE UNLIST      stent         Family History:   Problem Relation Age of Onset    Heart Disease Mother     Stroke Mother     Diabetes Father     Stroke Father     Heart Disease Father        Social History     Socioeconomic History    Marital status:      Spouse name: Not on file    Number of children: Not on file    Years of education: Not on file    Highest education level: Not on file   Occupational History    Not on file   Tobacco Use    Smoking status: Never    Smokeless tobacco: Never   Substance and Sexual Activity    Alcohol use: No    Drug use: No    Sexual activity: Not on file   Other Topics Concern    Not on file   Social History Narrative    Not on file     Social Determinants of Health     Financial Resource Strain: Not on file   Food Insecurity: Not on file   Transportation Needs: Not on file   Physical Activity: Not on file   Stress: Not on file   Social Connections: Not on file   Intimate Partner Violence: Not on file   Housing Stability: Not on file         ALLERGIES: Iodinated contrast media    Review of Systems   Constitutional:  Positive for activity change. Negative for appetite change, chills and fever. HENT:  Negative for congestion, rhinorrhea, sinus pain, sneezing and sore throat. Eyes:  Negative for photophobia and visual disturbance. Respiratory:  Positive for chest tightness and shortness of breath. Negative for cough. Cardiovascular:  Positive for chest pain. Gastrointestinal:  Negative for abdominal pain, blood in stool, constipation, diarrhea, nausea and vomiting. Genitourinary:  Negative for difficulty urinating, dysuria, flank pain, hematuria, penile pain and testicular pain. Musculoskeletal:  Negative for arthralgias, back pain, myalgias and neck pain. Skin:  Negative for rash and wound. Neurological:  Negative for syncope, weakness, light-headedness, numbness and headaches. Psychiatric/Behavioral:  Negative for self-injury and suicidal ideas. The patient is nervous/anxious. All other systems reviewed and are negative. Vitals:    08/08/22 1739 08/08/22 1754 08/08/22 1758 08/08/22 1813   BP: (!) 181/96  (!) 158/90 (!) 165/91   Pulse:  (!) 110 92 86   Resp:    15   Temp:       SpO2:  94% 92% 93%   Weight:                Physical Exam  Vitals and nursing note reviewed. Constitutional:       General: He is not in acute distress. Appearance: Normal appearance. He is well-developed. He is not diaphoretic. Comments: Pleasant, speaking in full sentences, but anxious appearing. HENT:      Head: Normocephalic and atraumatic. Nose: Nose normal.   Eyes:      Extraocular Movements: Extraocular movements intact. Conjunctiva/sclera: Conjunctivae normal.      Pupils: Pupils are equal, round, and reactive to light. Cardiovascular:      Rate and Rhythm: Regular rhythm. Tachycardia present. Heart sounds: Normal heart sounds. Pulmonary:      Effort: Pulmonary effort is normal.      Breath sounds: Normal breath sounds. Abdominal:      General: There is no distension. Palpations: Abdomen is soft. Tenderness: There is no abdominal tenderness. Musculoskeletal:         General: No tenderness. Cervical back: Neck supple. Right lower leg: No tenderness. No edema. Left lower leg: No tenderness. No edema. Skin:     General: Skin is warm and dry. Neurological:      General: No focal deficit present. Mental Status: He is alert and oriented to person, place, and time. Cranial Nerves: No cranial nerve deficit. Sensory: No sensory deficit. Motor: No weakness.       Coordination: Coordination normal. MDM    64-year-old male presents with chest tightness and tachycardia, initially up to the 150s. Several blood pressure, satting normally on room air. Initial concern for recurrent pulmonary embolism. CXR viewed by myself and read by radiology showing no acute abnormalities. She has known small mass in the right midlung that patient states has been present for several years thought to be granuloma from aspergillosis. VQ scan was done given anaphylactic reaction to IV contrast and returned showing very low suspicion for PE. Labs returned reassuringly showing no significant normalities, negative D-dimer,Negative initial troponin, mildly elevated glucose at 236, but normal bicarb and anion gap, known diabetic. Creatinine 1.28, but mildly elevated BUN at 27, calcium mildly elevated at 11, suspect some aspect of dehydration, no leukocytosis or anemia. He was given IV fluid bolus and had improvement in his heart rate with no further tachycardia. His anxiety and chest tightness significantly improved with rest while in the ED as well. Repeat troponin negative. Feel that his symptoms likely anxiety and dehydration associated. Reassurance was given, he was recommended increase p.o. fluid intake and recommended PCP follow-up for further evaluation and return precautions were given for worsening or concerns. This plan was discussed with the patient and his wife at the bedside and they stated both understanding and agreement. Please note that this dictation was completed with [employer], the computer voice recognition software. Quite often unanticipated grammatical, syntax, homophones, and other interpretive errors are inadvertently transcribed by the computer software. Please disregard these errors. Please excuse any errors that have escaped final proofreading.     Procedures    0604 EKG shows sinus tachycardia with a rate of 145 bpm, RBBB, evidence of inferior and lateral infarct but no clear acute ST elevation or depression.

## 2022-08-10 LAB
ATRIAL RATE: 145 BPM
CALCULATED P AXIS, ECG09: 81 DEGREES
CALCULATED R AXIS, ECG10: -142 DEGREES
CALCULATED T AXIS, ECG11: 26 DEGREES
DIAGNOSIS, 93000: NORMAL
P-R INTERVAL, ECG05: 86 MS
Q-T INTERVAL, ECG07: 336 MS
QRS DURATION, ECG06: 132 MS
QTC CALCULATION (BEZET), ECG08: 521 MS
VENTRICULAR RATE, ECG03: 145 BPM

## 2022-11-18 ENCOUNTER — TRANSCRIBE ORDER (OUTPATIENT)
Dept: SCHEDULING | Age: 57
End: 2022-11-18

## 2022-11-18 DIAGNOSIS — I27.20 PULMONARY HYPERTENSION (HCC): Primary | ICD-10-CM

## 2023-01-01 NOTE — PROGRESS NOTES
OCCUPATIONAL THERAPY EVALUATION/DISCHARGE  Patient: Sofia Cartagena (78 y.o. male)  Date: 11/20/2020  Primary Diagnosis: Type 2 diabetes mellitus with left diabetic foot infection ([facility] [E11.628, L08.9]       Precautions:        ASSESSMENT  Based on the objective data described below, the patient presents with hospital admission secondary to L foot infection. Patient with history of  CVA in 2016 with some L side weakness and memory deficits. Patient with good strength proximally, but noted weakness to L  but with no difficulty using utensils/opening containers for meal.  Patient reports no pain to L foot and reports no specific WB for L foot. Patient demonstrating baseline for ADLs/transfers. Patient currently mod I -independent with ADL tasks and does not require acute OT services. Functional Outcome Measure: The patient scored 90/100 on the Barthel Index outcome measure. Other factors to consider for discharge:      PLAN :  Recommend with staff:     Recommendation for discharge: (in order for the patient to meet his/her long term goals)  No skilled occupational therapy/ follow up rehabilitation needs identified at this time. This discharge recommendation:  Has not yet been discussed the attending provider and/or case management    IF patient discharges home will need the following DME: none       SUBJECTIVE:   Patient stated It doesn't hurt at all.     OBJECTIVE DATA SUMMARY:   HISTORY:   Past Medical History:   Diagnosis Date    BPH (benign prostatic hyperplasia)     CAD (coronary artery disease)     multivessel    CKD (chronic kidney disease), stage III ([address]     DM type 2 causing neurological disease ([address]     DM type 2 causing renal disease ([address]     DM type 2 causing vascular disease (HCC)     Dyslipidemia, goal LDL below 70     Essential hypertension, benign     History of acute inferior wall MI 5/30/2017    Lupus ([address]     Neuropathy     Pulmonary embolus ([address] 06/2018    Thoracic outlet syndrome      Past Surgical History:   Procedure Laterality Date    HX ORTHOPAEDIC  1996    L. knee arthroscopy       Prior Level of Function/Environment/Context: mod I   Expanded or extensive additional review of patient history:   Home Situation  Home Environment: Private residence  # Steps to Enter: 2  Rails to Enter: No  One/Two Story Residence: One story  Living Alone: No  Support Systems: Spouse/Significant Other/Partner  Patient Expects to be Discharged to[de-identified] Private residence  Current DME Used/Available at Home: Cane, straight  Tub or Shower Type: Tub/Shower combination    Hand dominance: Right    EXAMINATION OF PERFORMANCE DEFICITS:  Cognitive/Behavioral Status:  Neurologic State: Alert  Orientation Level: Oriented X4  Cognition: Follows commands  Perception: Appears intact  Perseveration: No perseveration noted  Safety/Judgement: Insight into deficits; Awareness of environment    Skin: intact as seen    Edema: none noted     Hearing: Auditory  Auditory Impairment: None    Vision/Perceptual:                                     Range of Motion:  AROM: Within functional limits(slightly less in Lprox UE/LE from CVA 2013)  PROM: Within functional limits                      Strength:  Strength: Generally decreased, functional(LUE/LE proximally)                Coordination:  Coordination: Generally decreased, functional  Fine Motor Skills-Upper: Left Impaired;Right Intact    Gross Motor Skills-Upper: Left Intact; Right Intact    Tone & Sensation:  Tone: Normal  Sensation: Impaired                      Balance:  Sitting: Intact  Standing: Intact    Functional Mobility and Transfers for ADLs:  Bed Mobility:  Rolling: Independent  Supine to Sit: Independent  Sit to Supine: Independent  Scooting: Independent    Transfers:  Sit to Stand: Independent  Stand to Sit: Independent  Bed to Chair: Independent  Bathroom Mobility: Modified independent  Toilet Transfer : Modified independent    ADL Assessment:  Feeding: Independent    Oral Facial Hygiene/Grooming: Independent    Bathing: Independent    Upper Body Dressing: Modified independent    Lower Body Dressing: Modified independent    Toileting: Independent                ADL Intervention and task modifications:                                     Cognitive Retraining  Safety/Judgement: Insight into deficits; Awareness of environment    Therapeutic Exercise:     Functional Measure:  Barthel Index:    Bathin  Bladder: 10  Bowels: 10  Groomin  Dressing: 10  Feeding: 10  Mobility: 10  Stairs: 10  Toilet Use: 10  Transfer (Bed to Chair and Back): 10  Total: 90/100        The Barthel ADL Index: Guidelines  1. The index should be used as a record of what a patient does, not as a record of what a patient could do. 2. The main aim is to establish degree of independence from any help, physical or verbal, however minor and for whatever reason. 3. The need for supervision renders the patient not independent. 4. A patient's performance should be established using the best available evidence. Asking the patient, friends/relatives and nurses are the usual sources, but direct observation and common sense are also important. However direct testing is not needed. 5. Usually the patient's performance over the preceding 24-48 hours is important, but occasionally longer periods will be relevant. 6. Middle categories imply that the patient supplies over 50 per cent of the effort. 7. Use of aids to be independent is allowed. Judy Rodrigez., Barthel, D.W. ([id number]). Functional evaluation: the Barthel Index. [address] [facility] (14)2. Chares Show claudette [place], J.J.M.F, Justyn Simon., Kayla Hernandez., [place], [address] [place] [address] (). Measuring the change indisability after inpatient rehabilitation; comparison of the responsiveness of the Barthel Index and Functional [place] Measure. Journal of Neurology, Neurosurgery, and Psychiatry, 66(4), 253-656.   CURT Mccray, Thania Hernandez, CHRISTIANO, & Kannan Arambula M.A. (2004.) Assessment of post-stroke quality of life in cost-effectiveness studies: The usefulness of the Barthel Index and the EuroQoL-5D. Quality of Life Research, 15, 528-98         Occupational Therapy Evaluation Charge Determination   History Examination Decision-Making   LOW Complexity : Brief history review  LOW Complexity : 1-3 performance deficits relating to physical, cognitive , or psychosocial skils that result in activity limitations and / or participation restrictions  LOW Complexity : No comorbidities that affect functional and no verbal or physical assistance needed to complete eval tasks       Based on the above components, the patient evaluation is determined to be of the following complexity level: LOW   Pain Rating:      Activity Tolerance:   Good    After treatment patient left in no apparent distress:    Sitting in chair and Call bell within reach    COMMUNICATION/EDUCATION:   The patients plan of care was discussed with: Physical therapist and Registered nurse.      Thank you for this referral.  Abner Becerra OTR/L  Time Calculation: 16 mins 7.049

## 2023-04-03 PROBLEM — L08.9 TYPE 2 DIABETES MELLITUS WITH LEFT DIABETIC FOOT INFECTION (HCC): Status: RESOLVED | Noted: 2020-11-19 | Resolved: 2021-12-15

## 2023-04-03 PROBLEM — E11.628 TYPE 2 DIABETES MELLITUS WITH LEFT DIABETIC FOOT INFECTION (HCC): Status: RESOLVED | Noted: 2020-11-19 | Resolved: 2021-12-15

## 2023-05-30 RX ORDER — ACETAMINOPHEN 500 MG
1000 TABLET ORAL EVERY 6 HOURS PRN
COMMUNITY

## 2023-05-30 RX ORDER — CLOPIDOGREL BISULFATE 75 MG/1
75 TABLET ORAL DAILY
COMMUNITY
Start: 2016-05-07

## 2023-05-30 RX ORDER — METOPROLOL SUCCINATE 100 MG/1
100 TABLET, EXTENDED RELEASE ORAL DAILY
COMMUNITY

## 2023-05-30 RX ORDER — FLUOXETINE 10 MG/1
10 TABLET ORAL DAILY
COMMUNITY

## 2023-05-30 RX ORDER — CLONIDINE HYDROCHLORIDE 0.3 MG/1
0.3 TABLET ORAL 3 TIMES DAILY
COMMUNITY

## 2023-05-30 RX ORDER — MIRTAZAPINE 15 MG/1
15 TABLET, FILM COATED ORAL NIGHTLY
COMMUNITY

## 2023-05-30 RX ORDER — TRAZODONE HYDROCHLORIDE 50 MG/1
50 TABLET ORAL PRN
COMMUNITY

## 2023-05-30 RX ORDER — CYCLOBENZAPRINE HCL 10 MG
10 TABLET ORAL 2 TIMES DAILY
COMMUNITY
Start: 2016-11-30

## 2023-05-30 RX ORDER — CALCIUM CARBONATE 260MG(650)
200 TABLET,CHEWABLE ORAL DAILY
COMMUNITY

## 2023-05-30 RX ORDER — OLMESARTAN MEDOXOMIL 40 MG/1
40 TABLET ORAL DAILY
COMMUNITY

## 2023-05-30 RX ORDER — AMLODIPINE BESYLATE 10 MG/1
10 TABLET ORAL EVERY EVENING
COMMUNITY

## 2023-05-30 RX ORDER — TAMSULOSIN HYDROCHLORIDE 0.4 MG/1
0.4 CAPSULE ORAL EVERY EVENING
COMMUNITY

## 2023-05-30 RX ORDER — BISACODYL 5 MG/1
5 TABLET, DELAYED RELEASE ORAL DAILY
COMMUNITY

## 2023-05-30 RX ORDER — ASPIRIN 81 MG/1
81 TABLET ORAL DAILY
COMMUNITY
Start: 2019-11-14

## 2023-05-30 RX ORDER — WHEAT DEXTRIN 3 G/3.8 G
3 POWDER (GRAM) ORAL EVERY EVENING
COMMUNITY

## 2023-05-30 RX ORDER — INSULIN LISPRO 100 [IU]/ML
INJECTION, SOLUTION INTRAVENOUS; SUBCUTANEOUS
COMMUNITY

## 2023-05-30 RX ORDER — BIOTIN 10000 MCG
10000 CAPSULE ORAL DAILY
COMMUNITY

## 2023-05-30 RX ORDER — METFORMIN HYDROCHLORIDE 500 MG/1
1000 TABLET, FILM COATED, EXTENDED RELEASE ORAL 2 TIMES DAILY
COMMUNITY

## 2023-05-30 RX ORDER — ATORVASTATIN CALCIUM 80 MG/1
80 TABLET, FILM COATED ORAL EVERY EVENING
COMMUNITY

## (undated) DEVICE — TRAP SURG QUAD PARABOLA SLOT DSGN SFTY SCRN TRAPEASE

## (undated) DEVICE — SNARE ENDOSCP M L240CM W27MM SHTH DIA2.4MM CHN 2.8MM OVL

## (undated) DEVICE — TUBING HYDR IRR --

## (undated) DEVICE — FORCEPS BX L240CM JAW DIA2.8MM L CAP W/ NDL MIC MESH TOOTH

## (undated) DEVICE — REM POLYHESIVE ADULT PATIENT RETURN ELECTRODE: Brand: VALLEYLAB